# Patient Record
Sex: FEMALE | Race: WHITE | Employment: UNEMPLOYED | URBAN - METROPOLITAN AREA
[De-identification: names, ages, dates, MRNs, and addresses within clinical notes are randomized per-mention and may not be internally consistent; named-entity substitution may affect disease eponyms.]

---

## 2017-01-03 RX ORDER — METOPROLOL SUCCINATE 50 MG/1
50 TABLET, EXTENDED RELEASE ORAL DAILY
Qty: 90 TABLET | Refills: 0 | Status: SHIPPED | OUTPATIENT
Start: 2017-01-03 | End: 2017-03-17 | Stop reason: SDUPTHER

## 2017-01-20 ENCOUNTER — OFFICE VISIT (OUTPATIENT)
Dept: INTERNAL MEDICINE CLINIC | Age: 82
End: 2017-01-20

## 2017-01-20 VITALS
HEIGHT: 60 IN | HEART RATE: 59 BPM | RESPIRATION RATE: 18 BRPM | DIASTOLIC BLOOD PRESSURE: 75 MMHG | SYSTOLIC BLOOD PRESSURE: 110 MMHG

## 2017-01-20 DIAGNOSIS — I63.40 CEREBRAL INFARCTION DUE TO EMBOLISM OF CEREBRAL ARTERY (HCC): ICD-10-CM

## 2017-01-20 DIAGNOSIS — E66.01 MORBID OBESITY WITH BMI OF 50.0-59.9, ADULT (HCC): ICD-10-CM

## 2017-01-20 DIAGNOSIS — E11.9 TYPE 2 DIABETES MELLITUS WITHOUT COMPLICATION, WITHOUT LONG-TERM CURRENT USE OF INSULIN (HCC): Primary | ICD-10-CM

## 2017-01-20 DIAGNOSIS — I10 ESSENTIAL HYPERTENSION: ICD-10-CM

## 2017-01-20 DIAGNOSIS — I69.354 HEMIPLEGIA AND HEMIPARESIS FOLLOWING CEREBRAL INFARCTION AFFECTING LEFT NON-DOMINANT SIDE (HCC): ICD-10-CM

## 2017-01-20 DIAGNOSIS — F32.89 OTHER DEPRESSION: ICD-10-CM

## 2017-01-20 DIAGNOSIS — I48.0 PAROXYSMAL ATRIAL FIBRILLATION (HCC): ICD-10-CM

## 2017-01-20 PROCEDURE — 99214 OFFICE O/P EST MOD 30 MIN: CPT | Performed by: INTERNAL MEDICINE

## 2017-02-01 RX ORDER — FESOTERODINE FUMARATE 8 MG/1
TABLET, EXTENDED RELEASE ORAL
Qty: 90 TABLET | Refills: 0 | Status: SHIPPED | OUTPATIENT
Start: 2017-02-01 | End: 2017-04-27

## 2017-02-17 RX ORDER — SIMVASTATIN 20 MG
TABLET ORAL
Qty: 90 TABLET | Refills: 0 | Status: SHIPPED | OUTPATIENT
Start: 2017-02-17 | End: 2017-04-06 | Stop reason: SDUPTHER

## 2017-03-03 RX ORDER — ESCITALOPRAM OXALATE 10 MG/1
TABLET ORAL
Qty: 90 TABLET | Refills: 0 | Status: SHIPPED | OUTPATIENT
Start: 2017-03-03 | End: 2017-04-20 | Stop reason: SDUPTHER

## 2017-03-17 RX ORDER — SPIRONOLACTONE AND HYDROCHLOROTHIAZIDE 25; 25 MG/1; MG/1
1 TABLET ORAL DAILY
Qty: 90 TABLET | Refills: 0 | Status: SHIPPED | OUTPATIENT
Start: 2017-03-17 | End: 2017-06-01 | Stop reason: SDUPTHER

## 2017-03-17 RX ORDER — METOPROLOL SUCCINATE 50 MG/1
50 TABLET, EXTENDED RELEASE ORAL DAILY
Qty: 90 TABLET | Refills: 0 | Status: SHIPPED | OUTPATIENT
Start: 2017-03-17 | End: 2017-03-23 | Stop reason: SDUPTHER

## 2017-03-23 RX ORDER — METOPROLOL SUCCINATE 50 MG/1
50 TABLET, EXTENDED RELEASE ORAL DAILY
Qty: 90 TABLET | Refills: 0 | Status: SHIPPED | OUTPATIENT
Start: 2017-03-23 | End: 2017-06-09 | Stop reason: SDUPTHER

## 2017-04-07 ENCOUNTER — TELEPHONE (OUTPATIENT)
Dept: INTERNAL MEDICINE CLINIC | Age: 82
End: 2017-04-07

## 2017-04-07 RX ORDER — SIMVASTATIN 20 MG
20 TABLET ORAL NIGHTLY
Qty: 90 TABLET | Refills: 0 | Status: SHIPPED | OUTPATIENT
Start: 2017-04-07 | End: 2017-04-20 | Stop reason: SDUPTHER

## 2017-04-07 RX ORDER — BUTALBITAL, ACETAMINOPHEN, CAFFEINE AND CODEINE PHOSPHATE 300; 50; 40; 30 MG/1; MG/1; MG/1; MG/1
1 CAPSULE ORAL EVERY 8 HOURS PRN
Qty: 30 CAPSULE | Refills: 0 | Status: SHIPPED | OUTPATIENT
Start: 2017-04-07 | End: 2017-04-13 | Stop reason: SDUPTHER

## 2017-04-13 RX ORDER — OMEPRAZOLE 40 MG/1
40 CAPSULE, DELAYED RELEASE ORAL DAILY
Qty: 90 CAPSULE | Refills: 1 | Status: SHIPPED | OUTPATIENT
Start: 2017-04-13 | End: 2017-04-20 | Stop reason: SDUPTHER

## 2017-04-13 RX ORDER — FESOTERODINE FUMARATE 8 MG/1
TABLET, EXTENDED RELEASE ORAL
Qty: 90 TABLET | Refills: 0 | Status: CANCELLED | OUTPATIENT
Start: 2017-04-13

## 2017-04-13 RX ORDER — BUTALBITAL, ACETAMINOPHEN, CAFFEINE AND CODEINE PHOSPHATE 300; 50; 40; 30 MG/1; MG/1; MG/1; MG/1
1 CAPSULE ORAL EVERY 8 HOURS PRN
Qty: 30 CAPSULE | Refills: 0 | Status: SHIPPED | OUTPATIENT
Start: 2017-04-13 | End: 2018-03-08 | Stop reason: SDUPTHER

## 2017-04-20 RX ORDER — SIMVASTATIN 20 MG
20 TABLET ORAL NIGHTLY
Qty: 90 TABLET | Refills: 0 | Status: SHIPPED | OUTPATIENT
Start: 2017-04-20 | End: 2017-10-19 | Stop reason: SDUPTHER

## 2017-04-20 RX ORDER — MECLIZINE HCL 12.5 MG/1
12.5 TABLET ORAL 3 TIMES DAILY PRN
Qty: 270 TABLET | Refills: 0 | Status: SHIPPED | OUTPATIENT
Start: 2017-04-20 | End: 2017-08-07

## 2017-04-20 RX ORDER — ESCITALOPRAM OXALATE 10 MG/1
TABLET ORAL
Qty: 90 TABLET | Refills: 0 | Status: SHIPPED | OUTPATIENT
Start: 2017-04-20 | End: 2017-08-31 | Stop reason: SDUPTHER

## 2017-04-20 RX ORDER — OMEPRAZOLE 40 MG/1
40 CAPSULE, DELAYED RELEASE ORAL DAILY
Qty: 90 CAPSULE | Refills: 0 | Status: SHIPPED | OUTPATIENT
Start: 2017-04-20 | End: 2017-09-28 | Stop reason: SDUPTHER

## 2017-04-27 ENCOUNTER — OFFICE VISIT (OUTPATIENT)
Dept: INTERNAL MEDICINE CLINIC | Age: 82
End: 2017-04-27

## 2017-04-27 VITALS — DIASTOLIC BLOOD PRESSURE: 75 MMHG | SYSTOLIC BLOOD PRESSURE: 135 MMHG | RESPIRATION RATE: 18 BRPM | HEART RATE: 70 BPM

## 2017-04-27 DIAGNOSIS — E78.2 MIXED HYPERLIPIDEMIA: ICD-10-CM

## 2017-04-27 DIAGNOSIS — I10 ESSENTIAL HYPERTENSION: ICD-10-CM

## 2017-04-27 DIAGNOSIS — E66.01 MORBID OBESITY WITH BMI OF 50.0-59.9, ADULT (HCC): ICD-10-CM

## 2017-04-27 DIAGNOSIS — E11.9 TYPE 2 DIABETES MELLITUS WITHOUT COMPLICATION, WITHOUT LONG-TERM CURRENT USE OF INSULIN (HCC): ICD-10-CM

## 2017-04-27 DIAGNOSIS — F32.89 OTHER DEPRESSION: ICD-10-CM

## 2017-04-27 DIAGNOSIS — G81.90 HEMIPARESIS (HCC): ICD-10-CM

## 2017-04-27 DIAGNOSIS — I48.20 CHRONIC ATRIAL FIBRILLATION (HCC): ICD-10-CM

## 2017-04-27 DIAGNOSIS — I63.40 CEREBRAL INFARCTION DUE TO EMBOLISM OF CEREBRAL ARTERY (HCC): ICD-10-CM

## 2017-04-27 DIAGNOSIS — E11.9 TYPE 2 DIABETES MELLITUS WITHOUT COMPLICATION, WITHOUT LONG-TERM CURRENT USE OF INSULIN (HCC): Primary | ICD-10-CM

## 2017-04-27 LAB
A/G RATIO: 1.4 (ref 1.1–2.2)
ALBUMIN SERPL-MCNC: 4.1 G/DL (ref 3.4–5)
ALP BLD-CCNC: 97 U/L (ref 40–129)
ALT SERPL-CCNC: 30 U/L (ref 10–40)
ANION GAP SERPL CALCULATED.3IONS-SCNC: 17 MMOL/L (ref 3–16)
AST SERPL-CCNC: 38 U/L (ref 15–37)
BASOPHILS ABSOLUTE: 0.1 K/UL (ref 0–0.2)
BASOPHILS RELATIVE PERCENT: 0.9 %
BILIRUB SERPL-MCNC: 0.8 MG/DL (ref 0–1)
BUN BLDV-MCNC: 14 MG/DL (ref 7–20)
CALCIUM SERPL-MCNC: 8.7 MG/DL (ref 8.3–10.6)
CHLORIDE BLD-SCNC: 92 MMOL/L (ref 99–110)
CO2: 28 MMOL/L (ref 21–32)
CREAT SERPL-MCNC: 0.6 MG/DL (ref 0.6–1.2)
EOSINOPHILS ABSOLUTE: 0.4 K/UL (ref 0–0.6)
EOSINOPHILS RELATIVE PERCENT: 6.1 %
GFR AFRICAN AMERICAN: >60
GFR NON-AFRICAN AMERICAN: >60
GLOBULIN: 2.9 G/DL
GLUCOSE BLD-MCNC: 125 MG/DL (ref 70–99)
HCT VFR BLD CALC: 46.1 % (ref 36–48)
HEMOGLOBIN: 15.1 G/DL (ref 12–16)
LYMPHOCYTES ABSOLUTE: 2.1 K/UL (ref 1–5.1)
LYMPHOCYTES RELATIVE PERCENT: 30.4 %
MCH RBC QN AUTO: 30.3 PG (ref 26–34)
MCHC RBC AUTO-ENTMCNC: 32.7 G/DL (ref 31–36)
MCV RBC AUTO: 92.6 FL (ref 80–100)
MONOCYTES ABSOLUTE: 0.5 K/UL (ref 0–1.3)
MONOCYTES RELATIVE PERCENT: 6.9 %
NEUTROPHILS ABSOLUTE: 3.8 K/UL (ref 1.7–7.7)
NEUTROPHILS RELATIVE PERCENT: 55.7 %
PDW BLD-RTO: 14.1 % (ref 12.4–15.4)
PLATELET # BLD: 288 K/UL (ref 135–450)
PMV BLD AUTO: 8.3 FL (ref 5–10.5)
POTASSIUM SERPL-SCNC: 3.7 MMOL/L (ref 3.5–5.1)
RBC # BLD: 4.98 M/UL (ref 4–5.2)
SODIUM BLD-SCNC: 137 MMOL/L (ref 136–145)
TOTAL PROTEIN: 7 G/DL (ref 6.4–8.2)
WBC # BLD: 6.8 K/UL (ref 4–11)

## 2017-04-27 PROCEDURE — 99214 OFFICE O/P EST MOD 30 MIN: CPT | Performed by: INTERNAL MEDICINE

## 2017-04-27 RX ORDER — DABIGATRAN ETEXILATE 150 MG/1
150 CAPSULE, COATED PELLETS ORAL
COMMUNITY
Start: 2017-01-24 | End: 2018-09-19 | Stop reason: SINTOL

## 2017-04-28 LAB
ESTIMATED AVERAGE GLUCOSE: 122.6 MG/DL
HBA1C MFR BLD: 5.9 %

## 2017-05-01 ENCOUNTER — TELEPHONE (OUTPATIENT)
Dept: INTERNAL MEDICINE CLINIC | Age: 82
End: 2017-05-01

## 2017-05-08 ENCOUNTER — TELEPHONE (OUTPATIENT)
Dept: INTERNAL MEDICINE CLINIC | Age: 82
End: 2017-05-08

## 2017-05-25 ENCOUNTER — OFFICE VISIT (OUTPATIENT)
Dept: INTERNAL MEDICINE CLINIC | Age: 82
End: 2017-05-25

## 2017-05-25 VITALS — RESPIRATION RATE: 18 BRPM | DIASTOLIC BLOOD PRESSURE: 75 MMHG | HEART RATE: 70 BPM | SYSTOLIC BLOOD PRESSURE: 130 MMHG

## 2017-05-25 DIAGNOSIS — I69.351 HEMIPLEGIA AND HEMIPARESIS FOLLOWING CEREBRAL INFARCTION AFFECTING RIGHT DOMINANT SIDE (HCC): ICD-10-CM

## 2017-05-25 DIAGNOSIS — R07.9 CHEST PAIN, UNSPECIFIED TYPE: Primary | ICD-10-CM

## 2017-05-25 DIAGNOSIS — Z09 HOSPITAL DISCHARGE FOLLOW-UP: ICD-10-CM

## 2017-05-25 DIAGNOSIS — E66.01 MORBID OBESITY WITH BMI OF 50.0-59.9, ADULT (HCC): ICD-10-CM

## 2017-05-25 DIAGNOSIS — R35.0 FREQUENT URINATION: ICD-10-CM

## 2017-05-25 LAB
BILIRUBIN, POC: NORMAL
BLOOD URINE, POC: NORMAL
CLARITY, POC: NORMAL
COLOR, POC: NORMAL
GLUCOSE URINE, POC: NORMAL
KETONES, POC: NORMAL
LEUKOCYTE EST, POC: NORMAL
NITRITE, POC: NORMAL
PH, POC: NORMAL
PROTEIN, POC: NORMAL
SPECIFIC GRAVITY, POC: NORMAL
UROBILINOGEN, POC: NORMAL

## 2017-05-25 PROCEDURE — 99213 OFFICE O/P EST LOW 20 MIN: CPT | Performed by: INTERNAL MEDICINE

## 2017-05-25 PROCEDURE — 81002 URINALYSIS NONAUTO W/O SCOPE: CPT | Performed by: INTERNAL MEDICINE

## 2017-05-27 LAB
ORGANISM: ABNORMAL
URINE CULTURE, ROUTINE: ABNORMAL
URINE CULTURE, ROUTINE: ABNORMAL

## 2017-06-02 RX ORDER — SPIRONOLACTONE AND HYDROCHLOROTHIAZIDE 25; 25 MG/1; MG/1
1 TABLET ORAL DAILY
Qty: 90 TABLET | Refills: 0 | Status: SHIPPED | OUTPATIENT
Start: 2017-06-02 | End: 2017-08-31 | Stop reason: SDUPTHER

## 2017-06-05 ENCOUNTER — TELEPHONE (OUTPATIENT)
Dept: INTERNAL MEDICINE CLINIC | Age: 82
End: 2017-06-05

## 2017-06-09 RX ORDER — METOPROLOL SUCCINATE 50 MG/1
50 TABLET, EXTENDED RELEASE ORAL DAILY
Qty: 90 TABLET | Refills: 0 | Status: SHIPPED | OUTPATIENT
Start: 2017-06-09 | End: 2017-06-22 | Stop reason: SDUPTHER

## 2017-06-22 DIAGNOSIS — I48.0 PAROXYSMAL ATRIAL FIBRILLATION (HCC): Primary | ICD-10-CM

## 2017-06-22 RX ORDER — METOPROLOL SUCCINATE 50 MG/1
50 TABLET, EXTENDED RELEASE ORAL DAILY
Qty: 90 TABLET | Refills: 0 | Status: SHIPPED | OUTPATIENT
Start: 2017-06-22 | End: 2017-08-31 | Stop reason: SDUPTHER

## 2017-07-17 ENCOUNTER — OFFICE VISIT (OUTPATIENT)
Dept: INTERNAL MEDICINE CLINIC | Age: 82
End: 2017-07-17

## 2017-07-17 VITALS
DIASTOLIC BLOOD PRESSURE: 75 MMHG | SYSTOLIC BLOOD PRESSURE: 125 MMHG | RESPIRATION RATE: 18 BRPM | HEART RATE: 70 BPM | HEIGHT: 60 IN

## 2017-07-17 DIAGNOSIS — I10 ESSENTIAL HYPERTENSION: ICD-10-CM

## 2017-07-17 DIAGNOSIS — E78.2 MIXED HYPERLIPIDEMIA: ICD-10-CM

## 2017-07-17 DIAGNOSIS — G81.90: ICD-10-CM

## 2017-07-17 DIAGNOSIS — E11.9 TYPE 2 DIABETES MELLITUS WITHOUT COMPLICATION, WITHOUT LONG-TERM CURRENT USE OF INSULIN (HCC): Primary | ICD-10-CM

## 2017-07-17 DIAGNOSIS — I48.0 PAROXYSMAL ATRIAL FIBRILLATION (HCC): ICD-10-CM

## 2017-07-17 PROCEDURE — 99213 OFFICE O/P EST LOW 20 MIN: CPT | Performed by: INTERNAL MEDICINE

## 2017-07-20 ENCOUNTER — TELEPHONE (OUTPATIENT)
Dept: INTERNAL MEDICINE CLINIC | Age: 82
End: 2017-07-20

## 2017-07-20 ENCOUNTER — HOSPITAL ENCOUNTER (OUTPATIENT)
Dept: OTHER | Age: 82
Discharge: HOME | End: 2017-07-20
Attending: INTERNAL MEDICINE | Admitting: INTERNAL MEDICINE

## 2017-07-20 LAB — DIGOXIN LEVEL: 0.8 NG/ML (ref 0.8–2)

## 2017-07-27 RX ORDER — FESOTERODINE FUMARATE 8 MG/1
1 TABLET, EXTENDED RELEASE ORAL DAILY
Qty: 90 TABLET | Refills: 0 | Status: CANCELLED | OUTPATIENT
Start: 2017-07-27

## 2017-08-02 ENCOUNTER — TELEPHONE (OUTPATIENT)
Dept: INTERNAL MEDICINE CLINIC | Age: 82
End: 2017-08-02

## 2017-08-03 ENCOUNTER — TELEPHONE (OUTPATIENT)
Dept: INTERNAL MEDICINE CLINIC | Age: 82
End: 2017-08-03

## 2017-08-04 ENCOUNTER — TELEPHONE (OUTPATIENT)
Dept: INTERNAL MEDICINE CLINIC | Age: 82
End: 2017-08-04

## 2017-08-10 ENCOUNTER — HOSPITAL ENCOUNTER (OUTPATIENT)
Dept: SURGERY | Age: 82
Discharge: HOME | End: 2017-08-10
Attending: UROLOGY | Admitting: UROLOGY

## 2017-08-10 VITALS
WEIGHT: 272.3 LBS | HEIGHT: 60 IN | SYSTOLIC BLOOD PRESSURE: 141 MMHG | OXYGEN SATURATION: 95 % | BODY MASS INDEX: 53.46 KG/M2 | RESPIRATION RATE: 16 BRPM | DIASTOLIC BLOOD PRESSURE: 80 MMHG | TEMPERATURE: 97.4 F | HEART RATE: 74 BPM

## 2017-08-10 LAB
ANION GAP SERPL CALCULATED.3IONS-SCNC: 16 MMOL/L (ref 3–16)
BACTERIA: ABNORMAL /HPF
BILIRUBIN URINE: NEGATIVE
BLOOD, URINE: ABNORMAL
BUN BLDV-MCNC: 14 MG/DL (ref 7–20)
CALCIUM SERPL-MCNC: 9.2 MG/DL (ref 8.3–10.6)
CHLORIDE BLD-SCNC: 98 MMOL/L (ref 99–110)
CLARITY: CLEAR
CO2: 25 MMOL/L (ref 21–32)
COLOR: YELLOW
CREAT SERPL-MCNC: 0.6 MG/DL (ref 0.6–1.2)
EPITHELIAL CELLS, UA: ABNORMAL /HPF
GFR AFRICAN AMERICAN: >60
GFR NON-AFRICAN AMERICAN: >60
GLUCOSE BLD-MCNC: 125 MG/DL (ref 70–99)
GLUCOSE BLD-MCNC: 133 MG/DL (ref 70–99)
GLUCOSE URINE: NEGATIVE MG/DL
KETONES, URINE: NEGATIVE MG/DL
LEUKOCYTE ESTERASE, URINE: ABNORMAL
MICROSCOPIC EXAMINATION: YES
NITRITE, URINE: NEGATIVE
PERFORMED ON: ABNORMAL
PH UA: 5.5
POTASSIUM SERPL-SCNC: 4.2 MMOL/L (ref 3.5–5.1)
PROTEIN UA: NEGATIVE MG/DL
RBC UA: ABNORMAL /HPF (ref 0–2)
SODIUM BLD-SCNC: 139 MMOL/L (ref 136–145)
SPECIFIC GRAVITY UA: 1.02
URINE TYPE: ABNORMAL
UROBILINOGEN, URINE: 0.2 E.U./DL
WBC UA: ABNORMAL /HPF (ref 0–5)

## 2017-08-10 RX ORDER — OXYCODONE HYDROCHLORIDE AND ACETAMINOPHEN 5; 325 MG/1; MG/1
2 TABLET ORAL PRN
Status: DISPENSED | OUTPATIENT
Start: 2017-08-10 | End: 2017-08-10

## 2017-08-10 RX ORDER — ONDANSETRON 2 MG/ML
4 INJECTION INTRAMUSCULAR; INTRAVENOUS EVERY 30 MIN PRN
Status: DISCONTINUED | OUTPATIENT
Start: 2017-08-10 | End: 2017-08-11 | Stop reason: HOSPADM

## 2017-08-10 RX ORDER — OXYCODONE HYDROCHLORIDE AND ACETAMINOPHEN 5; 325 MG/1; MG/1
1 TABLET ORAL PRN
Status: ACTIVE | OUTPATIENT
Start: 2017-08-10 | End: 2017-08-10

## 2017-08-10 RX ORDER — CEPHALEXIN 500 MG/1
500 CAPSULE ORAL 2 TIMES DAILY
Qty: 14 CAPSULE | Refills: 0 | Status: SHIPPED | OUTPATIENT
Start: 2017-08-10 | End: 2017-08-17

## 2017-08-10 RX ORDER — SODIUM CHLORIDE, SODIUM LACTATE, POTASSIUM CHLORIDE, CALCIUM CHLORIDE 600; 310; 30; 20 MG/100ML; MG/100ML; MG/100ML; MG/100ML
INJECTION, SOLUTION INTRAVENOUS CONTINUOUS
Status: DISCONTINUED | OUTPATIENT
Start: 2017-08-10 | End: 2017-08-11 | Stop reason: HOSPADM

## 2017-08-10 RX ORDER — MEPERIDINE HYDROCHLORIDE 50 MG/ML
12.5 INJECTION INTRAMUSCULAR; INTRAVENOUS; SUBCUTANEOUS EVERY 5 MIN PRN
Status: DISCONTINUED | OUTPATIENT
Start: 2017-08-10 | End: 2017-08-11 | Stop reason: HOSPADM

## 2017-08-10 RX ORDER — HYDRALAZINE HYDROCHLORIDE 20 MG/ML
5 INJECTION INTRAMUSCULAR; INTRAVENOUS EVERY 30 MIN PRN
Status: DISCONTINUED | OUTPATIENT
Start: 2017-08-10 | End: 2017-08-11 | Stop reason: HOSPADM

## 2017-08-10 RX ORDER — LABETALOL HYDROCHLORIDE 5 MG/ML
5 INJECTION, SOLUTION INTRAVENOUS
Status: DISCONTINUED | OUTPATIENT
Start: 2017-08-10 | End: 2017-08-11 | Stop reason: HOSPADM

## 2017-08-10 RX ORDER — DIPHENHYDRAMINE HYDROCHLORIDE 50 MG/ML
6.25 INJECTION INTRAMUSCULAR; INTRAVENOUS
Status: ACTIVE | OUTPATIENT
Start: 2017-08-10 | End: 2017-08-10

## 2017-08-10 RX ADMIN — SODIUM CHLORIDE, SODIUM LACTATE, POTASSIUM CHLORIDE, CALCIUM CHLORIDE: 600; 310; 30; 20 INJECTION, SOLUTION INTRAVENOUS at 10:52

## 2017-08-11 LAB — URINE CULTURE, ROUTINE: NORMAL

## 2017-08-15 ENCOUNTER — OFFICE VISIT (OUTPATIENT)
Dept: INTERNAL MEDICINE CLINIC | Age: 82
End: 2017-08-15

## 2017-08-15 DIAGNOSIS — J02.9 ACUTE PHARYNGITIS, UNSPECIFIED ETIOLOGY: Primary | ICD-10-CM

## 2017-08-15 PROCEDURE — 99213 OFFICE O/P EST LOW 20 MIN: CPT | Performed by: INTERNAL MEDICINE

## 2017-08-15 RX ORDER — AMOXICILLIN 500 MG/1
500 CAPSULE ORAL 3 TIMES DAILY
Qty: 15 CAPSULE | Refills: 0 | Status: SHIPPED | OUTPATIENT
Start: 2017-08-15 | End: 2017-08-20

## 2017-08-24 ENCOUNTER — TELEPHONE (OUTPATIENT)
Dept: INTERNAL MEDICINE CLINIC | Age: 82
End: 2017-08-24

## 2017-08-24 RX ORDER — FESOTERODINE FUMARATE 8 MG/1
1 TABLET, EXTENDED RELEASE ORAL DAILY
Qty: 90 TABLET | Refills: 0 | Status: CANCELLED | OUTPATIENT
Start: 2017-08-24

## 2017-08-31 RX ORDER — ESCITALOPRAM OXALATE 10 MG/1
TABLET ORAL
Qty: 90 TABLET | Refills: 0 | Status: SHIPPED | OUTPATIENT
Start: 2017-08-31 | End: 2017-11-09 | Stop reason: SDUPTHER

## 2017-08-31 RX ORDER — SPIRONOLACTONE AND HYDROCHLOROTHIAZIDE 25; 25 MG/1; MG/1
1 TABLET ORAL DAILY
Qty: 90 TABLET | Refills: 0 | Status: SHIPPED | OUTPATIENT
Start: 2017-08-31 | End: 2017-12-15 | Stop reason: SDUPTHER

## 2017-08-31 RX ORDER — METOPROLOL SUCCINATE 50 MG/1
50 TABLET, EXTENDED RELEASE ORAL DAILY
Qty: 90 TABLET | Refills: 0 | Status: SHIPPED | OUTPATIENT
Start: 2017-08-31 | End: 2018-01-16 | Stop reason: SDUPTHER

## 2017-09-28 RX ORDER — OMEPRAZOLE 40 MG/1
40 CAPSULE, DELAYED RELEASE ORAL DAILY
Qty: 90 CAPSULE | Refills: 0 | Status: SHIPPED | OUTPATIENT
Start: 2017-09-28 | End: 2017-12-09 | Stop reason: SDUPTHER

## 2017-10-16 ENCOUNTER — TELEPHONE (OUTPATIENT)
Dept: INTERNAL MEDICINE CLINIC | Age: 82
End: 2017-10-16

## 2017-10-19 RX ORDER — SIMVASTATIN 20 MG
20 TABLET ORAL NIGHTLY
Qty: 90 TABLET | Refills: 0 | Status: SHIPPED | OUTPATIENT
Start: 2017-10-19 | End: 2017-12-15 | Stop reason: SDUPTHER

## 2017-10-30 ENCOUNTER — TELEPHONE (OUTPATIENT)
Dept: INTERNAL MEDICINE CLINIC | Age: 82
End: 2017-10-30

## 2017-11-09 RX ORDER — ESCITALOPRAM OXALATE 20 MG/1
TABLET ORAL
Qty: 90 TABLET | Refills: 0 | Status: SHIPPED | OUTPATIENT
Start: 2017-11-09 | End: 2017-12-15 | Stop reason: SDUPTHER

## 2017-12-11 RX ORDER — OMEPRAZOLE 40 MG/1
CAPSULE, DELAYED RELEASE ORAL
Qty: 90 CAPSULE | Refills: 0 | Status: SHIPPED | OUTPATIENT
Start: 2017-12-11 | End: 2018-09-06 | Stop reason: SDUPTHER

## 2017-12-15 RX ORDER — ESCITALOPRAM OXALATE 20 MG/1
TABLET ORAL
Qty: 90 TABLET | Refills: 0 | Status: SHIPPED | OUTPATIENT
Start: 2017-12-15 | End: 2018-03-08 | Stop reason: SDUPTHER

## 2017-12-15 RX ORDER — SPIRONOLACTONE AND HYDROCHLOROTHIAZIDE 25; 25 MG/1; MG/1
1 TABLET ORAL DAILY
Qty: 90 TABLET | Refills: 0 | Status: SHIPPED | OUTPATIENT
Start: 2017-12-15 | End: 2018-03-06 | Stop reason: SDUPTHER

## 2017-12-15 RX ORDER — SIMVASTATIN 20 MG
20 TABLET ORAL NIGHTLY
Qty: 90 TABLET | Refills: 0 | Status: SHIPPED | OUTPATIENT
Start: 2017-12-15 | End: 2018-07-03 | Stop reason: SDUPTHER

## 2017-12-15 NOTE — TELEPHONE ENCOUNTER
From: [place]  Sent: 12/14/2017 5:13 PM EST  Subject: Medication Renewal Request    [place] DENNY Woody would like a refill of the following medications:  spironolactone-hydrochlorothiazide (ALDACTAZIDE) 25-25 MG per tablet Arian Espino MD]  simvastatin (ZOCOR) 20 MG tablet Arian Espino MD]  escitalopram (LEXAPRO) 20 MG tablet Arian Espino MD]  metFORMIN (GLUCOPHAGE) 500 MG tablet Arian Espino MD]    Preferred pharmacy: [facility] [address], [address] [phone] - F [phone]    Comment:

## 2017-12-26 ENCOUNTER — TELEPHONE (OUTPATIENT)
Dept: INTERNAL MEDICINE CLINIC | Age: 82
End: 2017-12-26

## 2018-01-16 RX ORDER — METOPROLOL SUCCINATE 50 MG/1
TABLET, EXTENDED RELEASE ORAL
Qty: 90 TABLET | Refills: 0 | Status: SHIPPED | OUTPATIENT
Start: 2018-01-16 | End: 2018-03-08 | Stop reason: SDUPTHER

## 2018-02-06 ENCOUNTER — OFFICE VISIT (OUTPATIENT)
Dept: INTERNAL MEDICINE CLINIC | Age: 83
End: 2018-02-06

## 2018-02-06 VITALS
DIASTOLIC BLOOD PRESSURE: 75 MMHG | HEART RATE: 70 BPM | HEIGHT: 60 IN | RESPIRATION RATE: 18 BRPM | SYSTOLIC BLOOD PRESSURE: 125 MMHG

## 2018-02-06 DIAGNOSIS — E66.01 MORBID OBESITY WITH BMI OF 50.0-59.9, ADULT (HCC): ICD-10-CM

## 2018-02-06 DIAGNOSIS — E11.9 WELL CONTROLLED TYPE 2 DIABETES MELLITUS (HCC): ICD-10-CM

## 2018-02-06 DIAGNOSIS — I48.20 CHRONIC ATRIAL FIBRILLATION (HCC): ICD-10-CM

## 2018-02-06 DIAGNOSIS — I69.351 HEMIPLEGIA AND HEMIPARESIS FOLLOWING CEREBRAL INFARCTION AFFECTING RIGHT DOMINANT SIDE (HCC): ICD-10-CM

## 2018-02-06 DIAGNOSIS — F32.4 DEPRESSION, MAJOR, SINGLE EPISODE, IN PARTIAL REMISSION (HCC): ICD-10-CM

## 2018-02-06 DIAGNOSIS — I10 BENIGN ESSENTIAL HTN: Primary | ICD-10-CM

## 2018-02-06 LAB
A/G RATIO: 1.4 (ref 1.1–2.2)
ALBUMIN SERPL-MCNC: 4.2 G/DL (ref 3.4–5)
ALP BLD-CCNC: 90 U/L (ref 40–129)
ALT SERPL-CCNC: 20 U/L (ref 10–40)
ANION GAP SERPL CALCULATED.3IONS-SCNC: 15 MMOL/L (ref 3–16)
AST SERPL-CCNC: 25 U/L (ref 15–37)
BASOPHILS ABSOLUTE: 0.1 K/UL (ref 0–0.2)
BASOPHILS RELATIVE PERCENT: 1 %
BILIRUB SERPL-MCNC: 0.4 MG/DL (ref 0–1)
BUN BLDV-MCNC: 13 MG/DL (ref 7–20)
CALCIUM SERPL-MCNC: 9.7 MG/DL (ref 8.3–10.6)
CHLORIDE BLD-SCNC: 93 MMOL/L (ref 99–110)
CHOLESTEROL, TOTAL: 161 MG/DL (ref 0–199)
CO2: 30 MMOL/L (ref 21–32)
CREAT SERPL-MCNC: 0.7 MG/DL (ref 0.6–1.2)
DIGOXIN LEVEL: 0.8 NG/ML (ref 0.8–2)
EOSINOPHILS ABSOLUTE: 0.5 K/UL (ref 0–0.6)
EOSINOPHILS RELATIVE PERCENT: 6.3 %
GFR AFRICAN AMERICAN: >60
GFR NON-AFRICAN AMERICAN: >60
GLOBULIN: 3.1 G/DL
GLUCOSE BLD-MCNC: 138 MG/DL (ref 70–99)
HCT VFR BLD CALC: 43.3 % (ref 36–48)
HDLC SERPL-MCNC: 44 MG/DL (ref 40–60)
HEMOGLOBIN: 14.4 G/DL (ref 12–16)
LDL CHOLESTEROL CALCULATED: 77 MG/DL
LYMPHOCYTES ABSOLUTE: 2.3 K/UL (ref 1–5.1)
LYMPHOCYTES RELATIVE PERCENT: 30.6 %
MCH RBC QN AUTO: 30.7 PG (ref 26–34)
MCHC RBC AUTO-ENTMCNC: 33.3 G/DL (ref 31–36)
MCV RBC AUTO: 92.4 FL (ref 80–100)
MONOCYTES ABSOLUTE: 0.7 K/UL (ref 0–1.3)
MONOCYTES RELATIVE PERCENT: 8.8 %
NEUTROPHILS ABSOLUTE: 4 K/UL (ref 1.7–7.7)
NEUTROPHILS RELATIVE PERCENT: 53.3 %
PDW BLD-RTO: 14 % (ref 12.4–15.4)
PLATELET # BLD: 268 K/UL (ref 135–450)
PMV BLD AUTO: 7.9 FL (ref 5–10.5)
POTASSIUM SERPL-SCNC: 3.9 MMOL/L (ref 3.5–5.1)
RBC # BLD: 4.69 M/UL (ref 4–5.2)
SODIUM BLD-SCNC: 138 MMOL/L (ref 136–145)
TOTAL PROTEIN: 7.3 G/DL (ref 6.4–8.2)
TRIGL SERPL-MCNC: 201 MG/DL (ref 0–150)
VLDLC SERPL CALC-MCNC: 40 MG/DL
WBC # BLD: 7.4 K/UL (ref 4–11)

## 2018-02-06 PROCEDURE — 1090F PRES/ABSN URINE INCON ASSESS: CPT | Performed by: INTERNAL MEDICINE

## 2018-02-06 PROCEDURE — G8428 CUR MEDS NOT DOCUMENT: HCPCS | Performed by: INTERNAL MEDICINE

## 2018-02-06 PROCEDURE — 4040F PNEUMOC VAC/ADMIN/RCVD: CPT | Performed by: INTERNAL MEDICINE

## 2018-02-06 PROCEDURE — G8400 PT W/DXA NO RESULTS DOC: HCPCS | Performed by: INTERNAL MEDICINE

## 2018-02-06 PROCEDURE — G8417 CALC BMI ABV UP PARAM F/U: HCPCS | Performed by: INTERNAL MEDICINE

## 2018-02-06 PROCEDURE — 1123F ACP DISCUSS/DSCN MKR DOCD: CPT | Performed by: INTERNAL MEDICINE

## 2018-02-06 PROCEDURE — 99214 OFFICE O/P EST MOD 30 MIN: CPT | Performed by: INTERNAL MEDICINE

## 2018-02-06 PROCEDURE — G8598 ASA/ANTIPLAT THER USED: HCPCS | Performed by: INTERNAL MEDICINE

## 2018-02-06 PROCEDURE — G8484 FLU IMMUNIZE NO ADMIN: HCPCS | Performed by: INTERNAL MEDICINE

## 2018-02-06 PROCEDURE — 1036F TOBACCO NON-USER: CPT | Performed by: INTERNAL MEDICINE

## 2018-02-06 NOTE — PROGRESS NOTES
Subjective:      Patient ID: Nilda Judge is a 80 y.o. female. HPI         80 y.o. female  with known history of GI Bleed, htn, hyperlipidemia, A Fib, CVA with hemiparalysis lt side, and GERD. No further chest pains     Afibb - chronic with h.o CVA = was on coumadin . changed to pradaxa by cardiology 2017  No issues so far  No recent falls  Remains wheel chair bound   provides most care      DM- 2 - now on metformin with no side effects. Fasting sugars at 120-130. No low sugars. Depression -stable off celexa     urinary incontinence- Tanner Edwards working better than oxybutnin. Still with recurrent UTI . Recent cysto withno acute findings       No fevers. No chills. No nausea or vomiting . No sob. No falls.   No new unilateral weakness       Has right shoulder pain and arm pain  - chronic       Review of Systems    As above    Current Outpatient Prescriptions on File Prior to Visit   Medication Sig Dispense Refill    metoprolol succinate (TOPROL XL) 50 MG extended release tablet TAKE 1 TABLET DAILY 90 tablet 0    spironolactone-hydrochlorothiazide (ALDACTAZIDE) 25-25 MG per tablet Take 1 tablet by mouth daily 90 tablet 0    simvastatin (ZOCOR) 20 MG tablet Take 1 tablet by mouth nightly 90 tablet 0    escitalopram (LEXAPRO) 20 MG tablet TAKE 1 TABLET NIGHTLY 90 tablet 0    metFORMIN (GLUCOPHAGE) 500 MG tablet Take 1 tablet by mouth daily (with breakfast) 90 tablet 0    omeprazole (PRILOSEC) 40 MG delayed release capsule TAKE 1 CAPSULE DAILY 90 capsule 0    losartan (COZAAR) 50 MG tablet Take 50 mg by mouth daily      Multiple Vitamins-Minerals-FA (OCUVEL PO) Take 1 tablet by mouth daily      olopatadine (PATADAY) 0.2 % SOLN ophthalmic solution Apply 1 drop to eye daily Both eyes      cephALEXin (KEFLEX) 250 MG capsule Take 250 mg by mouth daily      niacin 500 MG extended release capsule Take 500 mg by mouth nightly      digoxin (LANOXIN) 125 MCG tablet Take 125 mcg by mouth daily      dabigatran (PRADAXA) 150 MG capsule Take 150 mg by mouth      butalbital-acetaminophen-caffeine-codeine (FIORICET WITH CODEINE) -91-30 MG per capsule Take 1 capsule by mouth every 8 hours as needed (migraine) 30 capsule 0    Fesoterodine Fumarate ER (TOVIAZ) 8 MG TB24 Take 1 tablet by mouth daily 90 tablet 0    ammonium lactate (LAC-HYDRIN) 12 % lotion Apply topically daily. (Patient taking differently: Apply topically as needed Apply topically daily.) 120 g 2    Blood Glucose Monitoring Suppl ([address]) W/DEVICE KIT Patient tests once daily. DX:E11.9 1 kit 0    ONE TOUCH LANCETS MISC Patient tests once daily. DX:E11.9 150 each 3    glucose blood VI test strips (ONE TOUCH TEST STRIPS) strip Patient tests once daily. DX: E11.9 150 each 3     No current facility-administered medications on file prior to visit. .There are no changes to past medical history, family history, social history or review of systems(except as noted in the history section) since prior note (all reviewed with patient). Objective:   Physical Exam     Vitals:    02/06/18 1553   BP: 125/75   Pulse: 70   Resp: 18       General appearance: elderly female alert, appears stated age and cooperative   Head: Normocephalic, without obvious abnormality, atraumatic   Eyes: conjunctivae/corneas clear. PERRL, EOM's intact. Neck: no adenopathy, no carotid bruit, no JVD, supple, symmetrical, trachea midline and thyroid not enlarged, symmetric, some cervical  Tenderness. Lungs- clear dusty  Heart: irregular rate and rhythm, S1, S2 normal, no murmur, click, rub or gallop   Abdomen: soft, non-tender; bowel sounds normal; no masses, no organomegaly   Extremities: extremities normal, atraumatic, dependant edema  Pulses: 2+ and symmetric   Edema - dependant edema  Neurologic: Grossly normal, left sided cva with chronic weakness . Wheel chair bound          Assessment:       1. Benign essential HTN     2.  Hemiplegia and hemiparesis

## 2018-02-07 LAB
ESTIMATED AVERAGE GLUCOSE: 128.4 MG/DL
HBA1C MFR BLD: 6.1 %

## 2018-02-09 ENCOUNTER — TELEPHONE (OUTPATIENT)
Dept: INTERNAL MEDICINE CLINIC | Age: 83
End: 2018-02-09

## 2018-02-09 NOTE — TELEPHONE ENCOUNTER
Home care orders waiting on  to fax [facility]. I attempted to call Santy Sommers because we cannot talk to her per her release of info. But number was disconnected anyway.

## 2018-02-09 NOTE — TELEPHONE ENCOUNTER
----- Message from Fran Palacios MD sent at 2/9/2018  4:21 PM EST -----  Contact: William Pozo [phone]  Ros Kerr     ----- Message -----  From: Chuck Campos  Sent: 2/9/2018   3:28 PM  To: Fran Palacios MD    Ladan(did not give me her relation to pt) requests that you approve Home Care for patient. She has used [facility] in the past and she likes them, she would also be okay with [facility].    Please call T[place] at [phone]  MT

## 2018-02-22 ENCOUNTER — TELEPHONE (OUTPATIENT)
Dept: INTERNAL MEDICINE CLINIC | Age: 83
End: 2018-02-22

## 2018-02-22 NOTE — TELEPHONE ENCOUNTER
----- Message from Billie Reddy MD sent at 2/22/2018  1:32 PM EST -----  Contact: Dewayne Portillo, Nurse, [phone]  Please update    ----- Message -----  From: Sal Pruitt  Sent: 2/22/2018  11:56 AM  To: Billie Reddy MD        ----- Message -----  From: Juan Fair  Sent: 2/22/2018  11:47 AM  To: Meeta Neri nurse Dewayne Portillo is wanting an update on the order for home care through [facility] she requested 2-9-18. I did verify the number because it was disconnected last time, and the correct number is [phone].    MT

## 2018-03-06 RX ORDER — OMEPRAZOLE 40 MG/1
CAPSULE, DELAYED RELEASE ORAL
Qty: 90 CAPSULE | Refills: 0 | Status: ON HOLD | OUTPATIENT
Start: 2018-03-06 | End: 2018-08-26 | Stop reason: HOSPADM

## 2018-03-07 RX ORDER — SPIRONOLACTONE AND HYDROCHLOROTHIAZIDE 25; 25 MG/1; MG/1
TABLET ORAL
Qty: 90 TABLET | Refills: 0 | Status: SHIPPED | OUTPATIENT
Start: 2018-03-07 | End: 2018-06-30 | Stop reason: SDUPTHER

## 2018-03-08 DIAGNOSIS — G43.809 OTHER MIGRAINE WITHOUT STATUS MIGRAINOSUS, NOT INTRACTABLE: Primary | ICD-10-CM

## 2018-03-08 RX ORDER — ESCITALOPRAM OXALATE 20 MG/1
TABLET ORAL
Qty: 90 TABLET | Refills: 0 | Status: SHIPPED | OUTPATIENT
Start: 2018-03-08 | End: 2018-03-09 | Stop reason: SDUPTHER

## 2018-03-08 RX ORDER — METOPROLOL SUCCINATE 50 MG/1
50 TABLET, EXTENDED RELEASE ORAL DAILY
Qty: 90 TABLET | Refills: 0 | Status: ON HOLD | OUTPATIENT
Start: 2018-03-08 | End: 2018-08-29 | Stop reason: SDUPTHER

## 2018-03-08 RX ORDER — SPIRONOLACTONE AND HYDROCHLOROTHIAZIDE 25; 25 MG/1; MG/1
TABLET ORAL
Qty: 90 TABLET | Refills: 0 | OUTPATIENT
Start: 2018-03-08

## 2018-03-08 RX ORDER — BUTALBITAL, ACETAMINOPHEN, CAFFEINE AND CODEINE PHOSPHATE 300; 50; 40; 30 MG/1; MG/1; MG/1; MG/1
1 CAPSULE ORAL EVERY 8 HOURS PRN
Qty: 30 CAPSULE | Refills: 0 | Status: SHIPPED | OUTPATIENT
Start: 2018-03-08 | End: 2018-06-30 | Stop reason: SDUPTHER

## 2018-03-08 NOTE — TELEPHONE ENCOUNTER
From: [place]  Sent: 3/8/2018 4:06 PM EST  Subject: Medication Renewal Request    Suzanne Torres.  Rain Ward would like a refill of the following medications:     metoprolol succinate (TOPROL XL) 50 MG extended release tablet Ruba Bautista MD]    Preferred pharmacy: [facility] [address] [phone] - F [phone]    Comment:

## 2018-03-09 RX ORDER — ESCITALOPRAM OXALATE 20 MG/1
TABLET ORAL
Qty: 90 TABLET | Refills: 0 | Status: ON HOLD | OUTPATIENT
Start: 2018-03-09 | End: 2018-08-28 | Stop reason: SDUPTHER

## 2018-03-09 NOTE — TELEPHONE ENCOUNTER
From: [place]  Sent: 3/8/2018 11:00 PM EST  Subject: Medication Renewal Request    [address] [place] [address]  [facility] would like a refill of the following medications:     escitalopram (LEXAPRO) 20 MG tablet Rakesh Bai MD]    Preferred pharmacy: [facility] [address] [phone] - F [phone]    Comment:

## 2018-03-19 RX ORDER — VENLAFAXINE HYDROCHLORIDE 37.5 MG/1
37.5 CAPSULE, EXTENDED RELEASE ORAL DAILY
Qty: 30 CAPSULE | Refills: 3 | Status: SHIPPED | OUTPATIENT
Start: 2018-03-19 | End: 2018-05-29 | Stop reason: SDUPTHER

## 2018-03-22 ENCOUNTER — TELEPHONE (OUTPATIENT)
Dept: INTERNAL MEDICINE CLINIC | Age: 83
End: 2018-03-22

## 2018-03-22 NOTE — TELEPHONE ENCOUNTER
----- Message from Colin Cheek MD sent at 3/22/2018  3:06 PM EDT -----  Contact: Giselle Jarrett [phone]  Continue   ----- Message -----  From: Monica Rosario  Sent: 3/22/2018   1:34 PM  To: MD Giselle Rizzo from [facility] called on behalf of pt. Inquiring about Pt. Being prescribed venlafaxine - should she continue taking other anti-depressants while taking the venlafaxine or should she discontinue a anti-depressants.     Future visit:06/04/2018  Last visit:02/06/2018  km

## 2018-05-09 ENCOUNTER — HOSPITAL ENCOUNTER (OUTPATIENT)
Dept: OTHER | Age: 83
Discharge: HOME | End: 2018-05-09
Attending: INTERNAL MEDICINE | Admitting: INTERNAL MEDICINE

## 2018-05-09 LAB
BACTERIA: ABNORMAL /HPF
BILIRUBIN URINE: NEGATIVE
BLOOD, URINE: NEGATIVE
CLARITY: CLEAR
COLOR: YELLOW
GLUCOSE URINE: NEGATIVE MG/DL
KETONES, URINE: NEGATIVE MG/DL
LEUKOCYTE ESTERASE, URINE: ABNORMAL
MICROSCOPIC EXAMINATION: YES
NITRITE, URINE: NEGATIVE
PH UA: 5.5
PROTEIN UA: NEGATIVE MG/DL
RBC UA: ABNORMAL /HPF (ref 0–2)
SPECIFIC GRAVITY UA: <=1.005
URINE TYPE: ABNORMAL
UROBILINOGEN, URINE: 0.2 E.U./DL
WBC UA: ABNORMAL /HPF (ref 0–5)

## 2018-05-12 LAB
ORGANISM: ABNORMAL
URINE CULTURE, ROUTINE: ABNORMAL
URINE CULTURE, ROUTINE: ABNORMAL

## 2018-05-29 ENCOUNTER — TELEPHONE (OUTPATIENT)
Dept: INTERNAL MEDICINE CLINIC | Age: 83
End: 2018-05-29

## 2018-05-29 RX ORDER — NITROFURANTOIN 25; 75 MG/1; MG/1
100 CAPSULE ORAL 2 TIMES DAILY
Qty: 14 CAPSULE | Refills: 0 | Status: SHIPPED | OUTPATIENT
Start: 2018-05-29 | End: 2018-06-05

## 2018-05-29 RX ORDER — VENLAFAXINE HYDROCHLORIDE 37.5 MG/1
37.5 CAPSULE, EXTENDED RELEASE ORAL DAILY
Qty: 90 CAPSULE | Refills: 0 | Status: SHIPPED | OUTPATIENT
Start: 2018-05-29 | End: 2018-06-04 | Stop reason: SDUPTHER

## 2018-06-04 ENCOUNTER — TELEPHONE (OUTPATIENT)
Dept: INTERNAL MEDICINE CLINIC | Age: 83
End: 2018-06-04

## 2018-06-04 RX ORDER — VENLAFAXINE HYDROCHLORIDE 37.5 MG/1
37.5 CAPSULE, EXTENDED RELEASE ORAL DAILY
Qty: 90 CAPSULE | Refills: 0 | Status: SHIPPED | OUTPATIENT
Start: 2018-06-04 | End: 2018-06-11 | Stop reason: SDUPTHER

## 2018-06-08 ENCOUNTER — TELEPHONE (OUTPATIENT)
Dept: INTERNAL MEDICINE CLINIC | Age: 83
End: 2018-06-08

## 2018-06-11 RX ORDER — VENLAFAXINE HYDROCHLORIDE 37.5 MG/1
37.5 CAPSULE, EXTENDED RELEASE ORAL DAILY
Qty: 90 CAPSULE | Refills: 0 | Status: ON HOLD | OUTPATIENT
Start: 2018-06-11 | End: 2018-08-28 | Stop reason: SDUPTHER

## 2018-06-30 DIAGNOSIS — G43.809 OTHER MIGRAINE WITHOUT STATUS MIGRAINOSUS, NOT INTRACTABLE: ICD-10-CM

## 2018-07-02 RX ORDER — BUTALBITAL, ACETAMINOPHEN, CAFFEINE AND CODEINE PHOSPHATE 300; 50; 40; 30 MG/1; MG/1; MG/1; MG/1
CAPSULE ORAL
Qty: 30 CAPSULE | Refills: 0 | Status: SHIPPED | OUTPATIENT
Start: 2018-07-02 | End: 2019-02-06 | Stop reason: SDUPTHER

## 2018-07-02 RX ORDER — SPIRONOLACTONE AND HYDROCHLOROTHIAZIDE 25; 25 MG/1; MG/1
TABLET ORAL
Qty: 90 TABLET | Refills: 0 | Status: SHIPPED | OUTPATIENT
Start: 2018-07-02 | End: 2018-11-26 | Stop reason: SDUPTHER

## 2018-07-03 ENCOUNTER — TELEPHONE (OUTPATIENT)
Dept: INTERNAL MEDICINE CLINIC | Age: 83
End: 2018-07-03

## 2018-07-03 RX ORDER — SIMVASTATIN 20 MG
20 TABLET ORAL NIGHTLY
Qty: 90 TABLET | Refills: 0 | Status: SHIPPED | OUTPATIENT
Start: 2018-07-03 | End: 2018-11-09 | Stop reason: SDUPTHER

## 2018-08-03 RX ORDER — OLOPATADINE HYDROCHLORIDE 2 MG/ML
1 SOLUTION/ DROPS OPHTHALMIC DAILY
OUTPATIENT
Start: 2018-08-03

## 2018-08-25 ENCOUNTER — APPOINTMENT (OUTPATIENT)
Dept: GENERAL RADIOLOGY | Age: 83
DRG: 291 | End: 2018-08-25
Payer: MEDICARE

## 2018-08-25 ENCOUNTER — HOSPITAL ENCOUNTER (INPATIENT)
Age: 83
LOS: 2 days | Discharge: HOME | DRG: 291 | End: 2018-08-29
Attending: EMERGENCY MEDICINE | Admitting: HOSPITALIST
Payer: MEDICARE

## 2018-08-25 DIAGNOSIS — I50.21 ACUTE SYSTOLIC CONGESTIVE HEART FAILURE (HCC): Primary | ICD-10-CM

## 2018-08-25 LAB
A/G RATIO: 1.2 (ref 1.1–2.2)
ALBUMIN SERPL-MCNC: 3.9 G/DL (ref 3.4–5)
ALP BLD-CCNC: 105 U/L (ref 40–129)
ALT SERPL-CCNC: 23 U/L (ref 10–40)
ANION GAP SERPL CALCULATED.3IONS-SCNC: 13 MMOL/L (ref 3–16)
AST SERPL-CCNC: 26 U/L (ref 15–37)
BASOPHILS ABSOLUTE: 0 K/UL (ref 0–0.2)
BASOPHILS RELATIVE PERCENT: 0.5 %
BILIRUB SERPL-MCNC: 0.8 MG/DL (ref 0–1)
BUN BLDV-MCNC: 11 MG/DL (ref 7–20)
CALCIUM SERPL-MCNC: 9.6 MG/DL (ref 8.3–10.6)
CHLORIDE BLD-SCNC: 96 MMOL/L (ref 99–110)
CO2: 27 MMOL/L (ref 21–32)
CREAT SERPL-MCNC: <0.5 MG/DL (ref 0.6–1.2)
EOSINOPHILS ABSOLUTE: 0.3 K/UL (ref 0–0.6)
EOSINOPHILS RELATIVE PERCENT: 3.4 %
GFR AFRICAN AMERICAN: >60
GFR NON-AFRICAN AMERICAN: >60
GLOBULIN: 3.3 G/DL
GLUCOSE BLD-MCNC: 149 MG/DL (ref 70–99)
HCT VFR BLD CALC: 41.2 % (ref 36–48)
HEMOGLOBIN: 14.1 G/DL (ref 12–16)
LIPASE: 19 U/L (ref 13–60)
LYMPHOCYTES ABSOLUTE: 1.8 K/UL (ref 1–5.1)
LYMPHOCYTES RELATIVE PERCENT: 17.4 %
MCH RBC QN AUTO: 31.5 PG (ref 26–34)
MCHC RBC AUTO-ENTMCNC: 34.3 G/DL (ref 31–36)
MCV RBC AUTO: 91.8 FL (ref 80–100)
MONOCYTES ABSOLUTE: 0.7 K/UL (ref 0–1.3)
MONOCYTES RELATIVE PERCENT: 6.8 %
NEUTROPHILS ABSOLUTE: 7.3 K/UL (ref 1.7–7.7)
NEUTROPHILS RELATIVE PERCENT: 71.9 %
PDW BLD-RTO: 13.5 % (ref 12.4–15.4)
PLATELET # BLD: 236 K/UL (ref 135–450)
PMV BLD AUTO: 7.4 FL (ref 5–10.5)
POTASSIUM SERPL-SCNC: 4.2 MMOL/L (ref 3.5–5.1)
PRO-BNP: 882 PG/ML (ref 0–449)
RBC # BLD: 4.49 M/UL (ref 4–5.2)
SODIUM BLD-SCNC: 136 MMOL/L (ref 136–145)
TOTAL PROTEIN: 7.2 G/DL (ref 6.4–8.2)
TROPONIN: <0.01 NG/ML
WBC # BLD: 10.2 K/UL (ref 4–11)

## 2018-08-25 PROCEDURE — 83880 ASSAY OF NATRIURETIC PEPTIDE: CPT

## 2018-08-25 PROCEDURE — 2580000003 HC RX 258: Performed by: EMERGENCY MEDICINE

## 2018-08-25 PROCEDURE — 99285 EMERGENCY DEPT VISIT HI MDM: CPT

## 2018-08-25 PROCEDURE — 71045 X-RAY EXAM CHEST 1 VIEW: CPT

## 2018-08-25 PROCEDURE — 96360 HYDRATION IV INFUSION INIT: CPT

## 2018-08-25 PROCEDURE — 83036 HEMOGLOBIN GLYCOSYLATED A1C: CPT

## 2018-08-25 PROCEDURE — 93005 ELECTROCARDIOGRAM TRACING: CPT | Performed by: EMERGENCY MEDICINE

## 2018-08-25 PROCEDURE — 83735 ASSAY OF MAGNESIUM: CPT

## 2018-08-25 PROCEDURE — 85025 COMPLETE CBC W/AUTO DIFF WBC: CPT

## 2018-08-25 PROCEDURE — 51702 INSERT TEMP BLADDER CATH: CPT

## 2018-08-25 PROCEDURE — 80053 COMPREHEN METABOLIC PANEL: CPT

## 2018-08-25 PROCEDURE — 83690 ASSAY OF LIPASE: CPT

## 2018-08-25 PROCEDURE — 84484 ASSAY OF TROPONIN QUANT: CPT

## 2018-08-25 PROCEDURE — 80162 ASSAY OF DIGOXIN TOTAL: CPT

## 2018-08-25 RX ORDER — ONDANSETRON 2 MG/ML
4 INJECTION INTRAMUSCULAR; INTRAVENOUS EVERY 30 MIN PRN
Status: DISCONTINUED | OUTPATIENT
Start: 2018-08-25 | End: 2018-08-26

## 2018-08-25 RX ORDER — 0.9 % SODIUM CHLORIDE 0.9 %
1000 INTRAVENOUS SOLUTION INTRAVENOUS ONCE
Status: COMPLETED | OUTPATIENT
Start: 2018-08-25 | End: 2018-08-26

## 2018-08-25 RX ADMIN — SODIUM CHLORIDE 1000 ML: 9 INJECTION, SOLUTION INTRAVENOUS at 23:22

## 2018-08-26 PROBLEM — R94.31 QT PROLONGATION: Status: ACTIVE | Noted: 2018-08-26

## 2018-08-26 PROBLEM — E66.01 MORBID OBESITY WITH BMI OF 50.0-59.9, ADULT (HCC): Chronic | Status: ACTIVE | Noted: 2018-08-26

## 2018-08-26 PROBLEM — R60.9 INTERSTITIAL EDEMA: Status: ACTIVE | Noted: 2018-08-26

## 2018-08-26 PROBLEM — R06.02 SOB (SHORTNESS OF BREATH): Status: ACTIVE | Noted: 2018-08-26

## 2018-08-26 PROBLEM — R79.89 ELEVATED BRAIN NATRIURETIC PEPTIDE (BNP) LEVEL: Status: ACTIVE | Noted: 2018-08-26

## 2018-08-26 PROBLEM — Z79.01 ANTICOAGULATED: Chronic | Status: ACTIVE | Noted: 2018-08-26

## 2018-08-26 PROBLEM — R10.9 ABDOMINAL PAIN: Status: ACTIVE | Noted: 2018-08-26

## 2018-08-26 LAB
ALBUMIN SERPL-MCNC: 3.2 G/DL (ref 3.4–5)
ANION GAP SERPL CALCULATED.3IONS-SCNC: 11 MMOL/L (ref 3–16)
APTT: 25.4 SEC (ref 26–36)
BASOPHILS ABSOLUTE: 0 K/UL (ref 0–0.2)
BASOPHILS RELATIVE PERCENT: 0.6 %
BUN BLDV-MCNC: 11 MG/DL (ref 7–20)
CALCIUM SERPL-MCNC: 8.7 MG/DL (ref 8.3–10.6)
CHLORIDE BLD-SCNC: 99 MMOL/L (ref 99–110)
CHOLESTEROL, TOTAL: 129 MG/DL (ref 0–199)
CO2: 26 MMOL/L (ref 21–32)
CREAT SERPL-MCNC: <0.5 MG/DL (ref 0.6–1.2)
DIGOXIN LEVEL: 0.5 NG/ML (ref 0.8–2)
EOSINOPHILS ABSOLUTE: 0.4 K/UL (ref 0–0.6)
EOSINOPHILS RELATIVE PERCENT: 5.6 %
ESTIMATED AVERAGE GLUCOSE: 139.9 MG/DL
GFR AFRICAN AMERICAN: >60
GFR NON-AFRICAN AMERICAN: >60
GLUCOSE BLD-MCNC: 113 MG/DL (ref 70–99)
GLUCOSE BLD-MCNC: 117 MG/DL (ref 70–99)
GLUCOSE BLD-MCNC: 128 MG/DL (ref 70–99)
GLUCOSE BLD-MCNC: 139 MG/DL (ref 70–99)
GLUCOSE BLD-MCNC: 141 MG/DL (ref 70–99)
HBA1C MFR BLD: 6.5 %
HCT VFR BLD CALC: 40.2 % (ref 36–48)
HDLC SERPL-MCNC: 36 MG/DL (ref 40–60)
HEMOGLOBIN: 13.4 G/DL (ref 12–16)
INR BLD: 1.1 (ref 0.86–1.14)
LACTIC ACID: 1.3 MMOL/L (ref 0.4–2)
LACTIC ACID: 1.3 MMOL/L (ref 0.4–2)
LDL CHOLESTEROL CALCULATED: 64 MG/DL
LYMPHOCYTES ABSOLUTE: 1.8 K/UL (ref 1–5.1)
LYMPHOCYTES RELATIVE PERCENT: 26.9 %
MAGNESIUM: 1.9 MG/DL (ref 1.8–2.4)
MAGNESIUM: 2 MG/DL (ref 1.8–2.4)
MCH RBC QN AUTO: 31.1 PG (ref 26–34)
MCHC RBC AUTO-ENTMCNC: 33.4 G/DL (ref 31–36)
MCV RBC AUTO: 93.2 FL (ref 80–100)
MONOCYTES ABSOLUTE: 0.6 K/UL (ref 0–1.3)
MONOCYTES RELATIVE PERCENT: 8.9 %
NEUTROPHILS ABSOLUTE: 3.9 K/UL (ref 1.7–7.7)
NEUTROPHILS RELATIVE PERCENT: 58 %
PDW BLD-RTO: 13.9 % (ref 12.4–15.4)
PERFORMED ON: ABNORMAL
PHOSPHORUS: 4.2 MG/DL (ref 2.5–4.9)
PLATELET # BLD: 196 K/UL (ref 135–450)
PMV BLD AUTO: 7.5 FL (ref 5–10.5)
POTASSIUM SERPL-SCNC: 3.9 MMOL/L (ref 3.5–5.1)
PRO-BNP: 1071 PG/ML (ref 0–449)
PROTHROMBIN TIME: 12.5 SEC (ref 9.8–13)
RBC # BLD: 4.31 M/UL (ref 4–5.2)
SODIUM BLD-SCNC: 136 MMOL/L (ref 136–145)
TRIGL SERPL-MCNC: 144 MG/DL (ref 0–150)
TROPONIN: <0.01 NG/ML
TROPONIN: <0.01 NG/ML
TSH REFLEX: 2.69 UIU/ML (ref 0.27–4.2)
VLDLC SERPL CALC-MCNC: 29 MG/DL
WBC # BLD: 6.7 K/UL (ref 4–11)

## 2018-08-26 PROCEDURE — 83605 ASSAY OF LACTIC ACID: CPT

## 2018-08-26 PROCEDURE — 96374 THER/PROPH/DIAG INJ IV PUSH: CPT

## 2018-08-26 PROCEDURE — 83735 ASSAY OF MAGNESIUM: CPT

## 2018-08-26 PROCEDURE — 84484 ASSAY OF TROPONIN QUANT: CPT

## 2018-08-26 PROCEDURE — 85730 THROMBOPLASTIN TIME PARTIAL: CPT

## 2018-08-26 PROCEDURE — G0378 HOSPITAL OBSERVATION PER HR: HCPCS

## 2018-08-26 PROCEDURE — 83880 ASSAY OF NATRIURETIC PEPTIDE: CPT

## 2018-08-26 PROCEDURE — 96361 HYDRATE IV INFUSION ADD-ON: CPT

## 2018-08-26 PROCEDURE — 84443 ASSAY THYROID STIM HORMONE: CPT

## 2018-08-26 PROCEDURE — 2700000000 HC OXYGEN THERAPY PER DAY

## 2018-08-26 PROCEDURE — 85610 PROTHROMBIN TIME: CPT

## 2018-08-26 PROCEDURE — 2580000003 HC RX 258: Performed by: HOSPITALIST

## 2018-08-26 PROCEDURE — 94761 N-INVAS EAR/PLS OXIMETRY MLT: CPT

## 2018-08-26 PROCEDURE — 6370000000 HC RX 637 (ALT 250 FOR IP): Performed by: HOSPITALIST

## 2018-08-26 PROCEDURE — 85025 COMPLETE CBC W/AUTO DIFF WBC: CPT

## 2018-08-26 PROCEDURE — 6360000002 HC RX W HCPCS: Performed by: HOSPITALIST

## 2018-08-26 PROCEDURE — 80061 LIPID PANEL: CPT

## 2018-08-26 PROCEDURE — 51702 INSERT TEMP BLADDER CATH: CPT

## 2018-08-26 PROCEDURE — 93010 ELECTROCARDIOGRAM REPORT: CPT | Performed by: INTERNAL MEDICINE

## 2018-08-26 PROCEDURE — 80069 RENAL FUNCTION PANEL: CPT

## 2018-08-26 PROCEDURE — 36415 COLL VENOUS BLD VENIPUNCTURE: CPT

## 2018-08-26 RX ORDER — OLOPATADINE HYDROCHLORIDE 2 MG/ML
1 SOLUTION/ DROPS OPHTHALMIC DAILY
Status: DISCONTINUED | OUTPATIENT
Start: 2018-08-26 | End: 2018-08-29 | Stop reason: HOSPADM

## 2018-08-26 RX ORDER — SODIUM CHLORIDE 0.9 % (FLUSH) 0.9 %
10 SYRINGE (ML) INJECTION EVERY 12 HOURS SCHEDULED
Status: DISCONTINUED | OUTPATIENT
Start: 2018-08-26 | End: 2018-08-29 | Stop reason: HOSPADM

## 2018-08-26 RX ORDER — SPIRONOLACTONE AND HYDROCHLOROTHIAZIDE 25; 25 MG/1; MG/1
1 TABLET ORAL DAILY
Status: DISCONTINUED | OUTPATIENT
Start: 2018-08-26 | End: 2018-08-26 | Stop reason: CLARIF

## 2018-08-26 RX ORDER — MAGNESIUM SULFATE 1 G/100ML
1 INJECTION INTRAVENOUS PRN
Status: DISCONTINUED | OUTPATIENT
Start: 2018-08-26 | End: 2018-08-29 | Stop reason: HOSPADM

## 2018-08-26 RX ORDER — PANTOPRAZOLE SODIUM 40 MG/1
40 TABLET, DELAYED RELEASE ORAL DAILY
Status: DISCONTINUED | OUTPATIENT
Start: 2018-08-26 | End: 2018-08-29 | Stop reason: HOSPADM

## 2018-08-26 RX ORDER — HYDROCHLOROTHIAZIDE 25 MG/1
25 TABLET ORAL DAILY
Status: DISCONTINUED | OUTPATIENT
Start: 2018-08-26 | End: 2018-08-29 | Stop reason: HOSPADM

## 2018-08-26 RX ORDER — ACETAMINOPHEN 325 MG/1
650 TABLET ORAL EVERY 4 HOURS PRN
Status: DISCONTINUED | OUTPATIENT
Start: 2018-08-26 | End: 2018-08-29 | Stop reason: HOSPADM

## 2018-08-26 RX ORDER — M-VIT,TX,IRON,MINS/CALC/FOLIC 27MG-0.4MG
1 TABLET ORAL DAILY
Status: DISCONTINUED | OUTPATIENT
Start: 2018-08-26 | End: 2018-08-29 | Stop reason: HOSPADM

## 2018-08-26 RX ORDER — DEXTROSE MONOHYDRATE 50 MG/ML
100 INJECTION, SOLUTION INTRAVENOUS PRN
Status: DISCONTINUED | OUTPATIENT
Start: 2018-08-26 | End: 2018-08-29 | Stop reason: HOSPADM

## 2018-08-26 RX ORDER — SODIUM CHLORIDE 0.9 % (FLUSH) 0.9 %
10 SYRINGE (ML) INJECTION PRN
Status: DISCONTINUED | OUTPATIENT
Start: 2018-08-26 | End: 2018-08-29 | Stop reason: HOSPADM

## 2018-08-26 RX ORDER — CEPHALEXIN 250 MG/1
250 CAPSULE ORAL DAILY
Status: DISCONTINUED | OUTPATIENT
Start: 2018-08-26 | End: 2018-08-29 | Stop reason: HOSPADM

## 2018-08-26 RX ORDER — POTASSIUM CHLORIDE 20 MEQ/1
40 TABLET, EXTENDED RELEASE ORAL PRN
Status: DISCONTINUED | OUTPATIENT
Start: 2018-08-26 | End: 2018-08-29 | Stop reason: HOSPADM

## 2018-08-26 RX ORDER — LOSARTAN POTASSIUM 25 MG/1
50 TABLET ORAL DAILY
Status: DISCONTINUED | OUTPATIENT
Start: 2018-08-26 | End: 2018-08-29 | Stop reason: HOSPADM

## 2018-08-26 RX ORDER — VENLAFAXINE HYDROCHLORIDE 37.5 MG/1
37.5 CAPSULE, EXTENDED RELEASE ORAL DAILY
Status: DISCONTINUED | OUTPATIENT
Start: 2018-08-26 | End: 2018-08-29 | Stop reason: HOSPADM

## 2018-08-26 RX ORDER — DABIGATRAN ETEXILATE 150 MG/1
150 CAPSULE, COATED PELLETS ORAL 2 TIMES DAILY
Status: DISCONTINUED | OUTPATIENT
Start: 2018-08-26 | End: 2018-08-29 | Stop reason: HOSPADM

## 2018-08-26 RX ORDER — SPIRONOLACTONE 25 MG/1
25 TABLET ORAL DAILY
Status: DISCONTINUED | OUTPATIENT
Start: 2018-08-26 | End: 2018-08-29 | Stop reason: HOSPADM

## 2018-08-26 RX ORDER — AMMONIUM LACTATE 12 G/100G
LOTION TOPICAL PRN
Status: DISCONTINUED | OUTPATIENT
Start: 2018-08-26 | End: 2018-08-29 | Stop reason: HOSPADM

## 2018-08-26 RX ORDER — POTASSIUM CHLORIDE 7.45 MG/ML
10 INJECTION INTRAVENOUS PRN
Status: DISCONTINUED | OUTPATIENT
Start: 2018-08-26 | End: 2018-08-29 | Stop reason: HOSPADM

## 2018-08-26 RX ORDER — SIMVASTATIN 10 MG
20 TABLET ORAL NIGHTLY
Status: DISCONTINUED | OUTPATIENT
Start: 2018-08-26 | End: 2018-08-29 | Stop reason: HOSPADM

## 2018-08-26 RX ORDER — TROSPIUM CHLORIDE 20 MG/1
20 TABLET, FILM COATED ORAL 2 TIMES DAILY
Status: DISCONTINUED | OUTPATIENT
Start: 2018-08-26 | End: 2018-08-29 | Stop reason: HOSPADM

## 2018-08-26 RX ORDER — NIACIN 500 MG/1
500 TABLET, EXTENDED RELEASE ORAL NIGHTLY
Status: DISCONTINUED | OUTPATIENT
Start: 2018-08-26 | End: 2018-08-29 | Stop reason: HOSPADM

## 2018-08-26 RX ORDER — DIGOXIN 125 MCG
125 TABLET ORAL DAILY
Status: DISCONTINUED | OUTPATIENT
Start: 2018-08-26 | End: 2018-08-29 | Stop reason: HOSPADM

## 2018-08-26 RX ORDER — POTASSIUM CHLORIDE 20MEQ/15ML
40 LIQUID (ML) ORAL PRN
Status: DISCONTINUED | OUTPATIENT
Start: 2018-08-26 | End: 2018-08-29 | Stop reason: HOSPADM

## 2018-08-26 RX ORDER — FUROSEMIDE 10 MG/ML
40 INJECTION INTRAMUSCULAR; INTRAVENOUS ONCE
Status: COMPLETED | OUTPATIENT
Start: 2018-08-26 | End: 2018-08-26

## 2018-08-26 RX ORDER — DEXTROSE MONOHYDRATE 25 G/50ML
12.5 INJECTION, SOLUTION INTRAVENOUS PRN
Status: DISCONTINUED | OUTPATIENT
Start: 2018-08-26 | End: 2018-08-29 | Stop reason: HOSPADM

## 2018-08-26 RX ORDER — NICOTINE POLACRILEX 4 MG
15 LOZENGE BUCCAL PRN
Status: DISCONTINUED | OUTPATIENT
Start: 2018-08-26 | End: 2018-08-29 | Stop reason: HOSPADM

## 2018-08-26 RX ORDER — METOPROLOL SUCCINATE 50 MG/1
50 TABLET, EXTENDED RELEASE ORAL DAILY
Status: DISCONTINUED | OUTPATIENT
Start: 2018-08-26 | End: 2018-08-29 | Stop reason: HOSPADM

## 2018-08-26 RX ORDER — ESCITALOPRAM OXALATE 10 MG/1
10 TABLET ORAL NIGHTLY
Status: DISCONTINUED | OUTPATIENT
Start: 2018-08-26 | End: 2018-08-29 | Stop reason: HOSPADM

## 2018-08-26 RX ADMIN — VENLAFAXINE HYDROCHLORIDE 37.5 MG: 37.5 CAPSULE, EXTENDED RELEASE ORAL at 08:24

## 2018-08-26 RX ADMIN — FUROSEMIDE 40 MG: 10 INJECTION, SOLUTION INTRAMUSCULAR; INTRAVENOUS at 03:36

## 2018-08-26 RX ADMIN — Medication 10 ML: at 08:26

## 2018-08-26 RX ADMIN — HYDROCHLOROTHIAZIDE 25 MG: 25 TABLET ORAL at 08:24

## 2018-08-26 RX ADMIN — DABIGATRAN ETEXILATE MESYLATE 150 MG: 150 CAPSULE ORAL at 08:27

## 2018-08-26 RX ADMIN — LOSARTAN POTASSIUM 50 MG: 25 TABLET, FILM COATED ORAL at 08:24

## 2018-08-26 RX ADMIN — ESCITALOPRAM OXALATE 10 MG: 10 TABLET ORAL at 20:59

## 2018-08-26 RX ADMIN — SPIRONOLACTONE 25 MG: 25 TABLET ORAL at 08:24

## 2018-08-26 RX ADMIN — PANTOPRAZOLE SODIUM 40 MG: 40 TABLET, DELAYED RELEASE ORAL at 08:25

## 2018-08-26 RX ADMIN — ACETAMINOPHEN 650 MG: 325 TABLET ORAL at 22:50

## 2018-08-26 RX ADMIN — SIMVASTATIN 20 MG: 10 TABLET, FILM COATED ORAL at 20:59

## 2018-08-26 RX ADMIN — CEPHALEXIN 250 MG: 250 CAPSULE ORAL at 08:24

## 2018-08-26 RX ADMIN — TROSPIUM CHLORIDE 20 MG: 20 TABLET ORAL at 08:27

## 2018-08-26 RX ADMIN — DABIGATRAN ETEXILATE MESYLATE 150 MG: 150 CAPSULE ORAL at 21:00

## 2018-08-26 RX ADMIN — NIACIN 500 MG: 500 TABLET, EXTENDED RELEASE ORAL at 20:59

## 2018-08-26 RX ADMIN — TROSPIUM CHLORIDE 20 MG: 20 TABLET ORAL at 21:00

## 2018-08-26 RX ADMIN — MULTIPLE VITAMINS W/ MINERALS TAB 1 TABLET: TAB at 08:24

## 2018-08-26 RX ADMIN — METOPROLOL SUCCINATE 50 MG: 50 TABLET, EXTENDED RELEASE ORAL at 08:25

## 2018-08-26 RX ADMIN — DIGOXIN 125 MCG: 125 TABLET ORAL at 08:24

## 2018-08-26 ASSESSMENT — PAIN SCALES - GENERAL
PAINLEVEL_OUTOF10: 3
PAINLEVEL_OUTOF10: 0
PAINLEVEL_OUTOF10: 0

## 2018-08-26 NOTE — H&P
0.2 % SOLN ophthalmic solution Apply 1 drop to eye daily Both eyes    Historical Provider, MD   niacin 500 MG extended release capsule Take 500 mg by mouth nightly    Historical Provider, MD   Fesoterodine Fumarate ER (TOVIAZ) 8 MG TB24 Take 1 tablet by mouth daily 11/11/16   Maryse Rios MD   ammonium lactate (LAC-HYDRIN) 12 % lotion Apply topically daily. Patient taking differently: Apply topically as needed Apply topically daily. 5/5/16   Maryse Rios MD       Allergies:  Tape [place] tape] and Erythromycin    Social History:      The patient currently lives at home. TOBACCO:   reports that she has never smoked. She has never used smokeless tobacco.  ETOH:   reports that she does not drink alcohol. Family History:      History reviewed. No pertinent family history. REVIEW OF SYSTEMS:   Pertinent positives as noted in the HPI. All other systems reviewed and negative. PHYSICAL EXAM PERFORMED:    BP (!) 162/75   Pulse 89   Temp 98.7 °F (37.1 °C) (Oral)   Resp 20   Ht 5' (1.524 m)   Wt 289 lb 4 oz (131.2 kg)   SpO2 92%   BMI 56.49 kg/m²     General appearance:  Elderly, morbidly obese WF lying in bed in NAD. HEENT:  Normal cephalic, atraumatic without obvious deformity. Pupils equal, round, and reactive to light. Extra ocular muscles intact. Conjunctivae/corneas clear. Neck: Supple, with full range of motion. No jugular venous distention. Trachea midline. Respiratory:  No respiratory distress. On 2 L O2 NC. Cardiovascular:  Regular rate and rhythm with normal S1/S2 without murmurs, rubs or gallops. Abdomen: Soft, non-tender, non-distended with normal bowel sounds. Musculoskeletal:  No clubbing, cyanosis or edema bilaterally. Full range of motion without deformity. Skin: Skin color, texture, turgor normal.  No rashes or lesions. Neurologic:  Neurovascularly intact without any focal sensory/motor deficits.  Cranial nerves: II-XII intact, grossly non-focal.  Psychiatric: Alert and oriented, thought content appropriate, normal insight  Capillary Refill: Brisk,< 3 seconds   Peripheral Pulses: +2 palpable, equal bilaterally       Labs:     Recent Labs      08/25/18 2316   WBC  10.2   HGB  14.1   HCT  41.2   PLT  236     Recent Labs      08/25/18 2316   NA  136   K  4.2   CL  96*   CO2  27   BUN  11   CREATININE  <0.5*   CALCIUM  9.6     Recent Labs      08/25/18 2316   AST  26   ALT  23   BILITOT  0.8   ALKPHOS  105     No results for input(s): INR in the last 72 hours. Recent Labs      08/25/18 2316   TROPONINI  <0.01       Urinalysis:      Lab Results   Component Value Date    NITRU Negative 05/09/2018    WBCUA 10-20 05/09/2018    BACTERIA 3+ 05/09/2018    RBCUA None seen 05/09/2018    BLOODU Negative 05/09/2018    SPECGRAV <=1.005 05/09/2018    GLUCOSEU Negative 05/09/2018       Radiology:       XR CHEST PORTABLE   Final Result   Moderate interstitial edema. ASSESSMENT:    Active Hospital Problems    Diagnosis Date Noted    Abdominal pain [R10.9] 08/26/2018    SOB (shortness of breath) [R06.02] 08/26/2018    Pulmonary interstitial edema on CXR [R60.9] 08/26/2018    Elevated brain natriuretic peptide (BNP) level [R79.89] 08/26/2018    QTc prolongation [R94.31] 08/26/2018    Morbid obesity with BMI of 50.0-59.9, adult (HCC) [E66.01, Z68.43] 08/26/2018    Anticoagulated on Pradaxa [Z79.01] 08/26/2018    CAD (coronary artery disease) [I25.10]     Hemiplegia affecting nondominant side s/p CVA [G81.90]     Hyperlipidemia [E78.5]     DM2 (diabetes mellitus, type 2) ([facility] [address] [E11.9] 10/14/2016    Paroxysmal atrial fibrillation ([facility] [address] [I48.0] 10/27/2015    Essential hypertension [I10] 10/27/2015    Depression [F32.9] 07/24/2015         PLAN:    SOB; ?Hypoxemia. - given CXR finding of interstitial edema and elevated BNP (in the setting of normal renal function), I will assume that CHF is the etiology.  She does not meet SIRS criteria and does not appear to have

## 2018-08-26 NOTE — CARE COORDINATION
INTERDISCIPLINARY PLAN OF CARE CONFERENCE    Date/Time: 8/26/2018 7:48 PM  Completed by: Gabby Costello Case Management      Patient Name:  Laura Dorantes  YOB: 1933  Admitting Diagnosis: SOB (shortness of breath) [R06.02]     Admit Date/Time:  8/25/2018 10:30 PM    Chart reviewed. Interdisciplinary team met to discuss patient progress and discharge plans. Disciplines included Case Management, Nursing, and Dietitian. Current Status: stable    Anticipated Discharge Date: 8/27/18  Expected D/C Disposition:  Home  Confirmed plan with patient and/or family Yes  Discharge Plan Comments:   Spoke with patient and family regarding continued stay in hospital today. She is observation status. She needs to have a test done in am and will go home after that. She is bed bound but her  can transfer her to [place] [address] and take home in [place] on 8/27/18.           Home O2 in place on admit: No  Pt informed of need to bring portable home O2 tank on day of discharge for nursing to connect prior to leaving:  Not Indicated  Verbalized agreement/Understanding:  Not Indicated

## 2018-08-26 NOTE — PROGRESS NOTES
Pt turned, repositioned, lozano cath in place with securing device. Pt is now resting on right side with pillow support. Skin assessed and is WNL and unremarkable. Coccyx pink/intact no need for preventative at this time. BLE elevated on pillows. Denies further needs. Call light within reach. Bed alarm is on. Will monitor.

## 2018-08-26 NOTE — DISCHARGE SUMMARY
Hospital Medicine Discharge Summary    Patient ID: Steve Grace      Patient's PCP: Eugenio Desai MD    Admit Date: 8/25/2018     Discharge Date:   8/26/2018    Admitting Physician: Candy Worrell MD     Discharge Physician: Any Hughes MD     Discharge Diagnoses: Active Hospital Problems    Diagnosis Date Noted    Abdominal pain [R10.9] 08/26/2018    SOB (shortness of breath) [R06.02] 08/26/2018    Pulmonary interstitial edema on CXR [R60.9] 08/26/2018    Elevated brain natriuretic peptide (BNP) level [R79.89] 08/26/2018    QTc prolongation [R94.31] 08/26/2018    Morbid obesity with BMI of 50.0-59.9, adult (HCC) [E66.01, Z68.43] 08/26/2018    Anticoagulated on Pradaxa [Z79.01] 08/26/2018    CAD (coronary artery disease) [I25.10]     Hemiplegia affecting nondominant side s/p CVA [G81.90]     Hyperlipidemia [E78.5]     DM2 (diabetes mellitus, type 2) ([facility] [address] [E11.9] 10/14/2016    Paroxysmal atrial fibrillation ([facility] [address] [I48.0] 10/27/2015    Essential hypertension [I10] 10/27/2015    Depression [F32.9] 07/24/2015       The patient was seen and examined on day of discharge and this discharge summary is in conjunction with any daily progress note from day of discharge. Hospital Course: R side upper abdominal pain started while her  was helping cleaning/bathing her.        Transient R-Sided Abd Pain. - suspect musculoskeletal   - LFT normal.   - No active GI symptoms.          PAF. - monitor on tele. - continue home digoxin, Toprol, and Pradaxa.        DM2.  - diabetic diet. - Accuchecks and SSI.        Hemiplegia s/p CVA. - pt is cared for by her  at home.        Morbid Obesity.  - encourage weight loss.   - dietary consult.            Physical Exam Performed:     BP (!) 154/85   Pulse 89   Temp 97.2 °F (36.2 °C) (Oral)   Resp 20   Ht 5' (1.524 m)   Wt 289 lb 4 oz (131.2 kg)   SpO2 95%   BMI 56.49 kg/m²       General appearance:  Elderly,

## 2018-08-26 NOTE — ED PROVIDER NOTES
[address] ED  eMERGENCY dEPARTMENT eNCOUnter        Pt Name: Abhinav Humphries  MRN: [id number]  [place] 11/14/1933  Date of evaluation: 8/25/2018  Provider: Bryan Neves MD  PCP: Avni Nicole MD      CHIEF COMPLAINT       Chief Complaint   Patient presents with    Abdominal Pain     transferred by EMS from private residence, pt reports that she started having r side abd pain and sob, both resolved at this time. HISTORY OF PRESENT ILLNESS   (Location/Symptom, Timing/Onset, Context/Setting, Quality, Duration, Modifying Factors, Severity)  Note limiting factors. Abhinav Humphries is a 80 y.o. female  Present complaints of shortness of breath and maybe some right-sided rib pain that has subsequently gone away but is still feeling short of breath she is on digoxin for atrial fibrillation she still complains of shortness of breath but no pain she does not have a history of congestive heart failure    Nursing Notes were all reviewed and agreed with or any disagreements were addressed  in the HPI. REVIEW OF SYSTEMS    (2-9 systems for level 4, 10 or more for level 5)     Review of Systems    Positives and Pertinent negatives as per HPI. Except as noted above in the ROS, all other systems were reviewed and negative.        PAST MEDICAL HISTORY     Past Medical History:   Diagnosis Date    Atrial fibrillation ([address]     CAD (coronary artery disease)     Cancer ([address]     skin cancer    Diabetes mellitus (HCC)     GERD (gastroesophageal reflux disease)     Hemiplegia, unspecified, affecting nondominant side     History of blood transfusion     Hyperlipidemia     Hypertension     Other disorders of kidney and ureter in diseases classified elsewhere     Psychiatric problem     Unspecified cerebral artery occlusion with cerebral infarction 2012    Unspecified cerebral artery occlusion with cerebral infarction          SURGICAL HISTORY       Past Surgical History:   Procedure Laterality Date    CHOLECYSTECTOMY      CYSTOSCOPY  08/10/2017    DILATION AND CURETTAGE OF UTERUS  12/06/2016    HEMORRHOID SURGERY      KNEE ARTHROSCOPY      bilateral    SKIN BIOPSY           CURRENT MEDICATIONS       Previous Medications    AMMONIUM LACTATE (LAC-HYDRIN) 12 % LOTION    Apply topically daily. BLOOD GLUCOSE MONITORING SUPPL (ONE TOUCH BASIC SYSTEM) W/DEVICE KIT    Patient tests once daily. DX:E11.9    CEPHALEXIN (KEFLEX) 250 MG CAPSULE    Take 250 mg by mouth daily    DABIGATRAN (PRADAXA) 150 MG CAPSULE    Take 150 mg by mouth    DIGOXIN (LANOXIN) 125 MCG TABLET    Take 125 mcg by mouth daily    ESCITALOPRAM (LEXAPRO) 20 MG TABLET    TAKE 1 TABLET NIGHTLY    FESOTERODINE FUMARATE ER (TOVIAZ) 8 MG TB24    Take 1 tablet by mouth daily    GLUCOSE BLOOD VI TEST STRIPS (ONE TOUCH TEST STRIPS) STRIP    Patient tests once daily. DX: E11.9    LOSARTAN (COZAAR) 50 MG TABLET    Take 50 mg by mouth daily    METFORMIN (GLUCOPHAGE) 500 MG TABLET    Take 1 tablet by mouth daily (with breakfast)    METOPROLOL SUCCINATE (TOPROL XL) 50 MG EXTENDED RELEASE TABLET    Take 1 tablet by mouth daily    MULTIPLE VITAMINS-MINERALS-FA (OCUVEL PO)    Take 1 tablet by mouth daily    NIACIN 500 MG EXTENDED RELEASE CAPSULE    Take 500 mg by mouth nightly    OLOPATADINE (PATADAY) 0.2 % SOLN OPHTHALMIC SOLUTION    Apply 1 drop to eye daily Both eyes    OMEPRAZOLE (PRILOSEC) 40 MG DELAYED RELEASE CAPSULE    TAKE 1 CAPSULE DAILY    OMEPRAZOLE (PRILOSEC) 40 MG DELAYED RELEASE CAPSULE    TAKE 1 CAPSULE DAILY    ONE TOUCH LANCETS MISC    Patient tests once daily.   DX:E11.9    SIMVASTATIN (ZOCOR) 20 MG TABLET    Take 1 tablet by mouth nightly    SPIRONOLACTONE-HYDROCHLOROTHIAZIDE (ALDACTAZIDE) 25-25 MG PER TABLET    TAKE 1 TABLET DAILY    VENLAFAXINE (EFFEXOR XR) 37.5 MG EXTENDED RELEASE CAPSULE    Take 1 capsule by mouth daily       ALLERGIES     Tape [adhesive tape] and Erythromycin    FAMILY HISTORY (!) 148/89   Pulse: 89 85   Resp: 22 19   Temp: 98.9 °F (37.2 °C) 98.3 °F (36.8 °C)   SpO2: 95% 92%   Weight: 300 lb (136.1 kg)    Height: 5' (1.524 m)        Patient was given the following medications:  Medications   ondansetron (ZOFRAN) injection 4 mg (not administered)   0.9 % sodium chloride bolus (0 mLs Intravenous Stopped 8/26/18 0036)           The patient tolerated their visit well. The patient and / or the family were informed of the results of any tests, a time was given to answer questions. FINAL IMPRESSION      1. Acute systolic congestive heart failure [facility])          DISPOSITION/PLAN   DISPOSITION Decision To Admit 08/25/2018 11:59:51 PM      PATIENT REFERRED TO:  No follow-up provider specified.     DISCHARGE MEDICATIONS:  New Prescriptions    No medications on file       DISCONTINUED MEDICATIONS:  Discontinued Medications    No medications on file              (Please note that portions of this note were completed with a voice recognition program.  Efforts were made to edit the dictations but occasionally words are mis-transcribed.)    Dann Victor MD (electronically signed)      Dann Victor MD  08/26/18 6831

## 2018-08-26 NOTE — PROGRESS NOTES
Patient admitted to floor from ED via stretcher with RN at bedside. Patient assisted to bed and tele monitor applied. Pt is AFib rate controlled. VSS. Admission completed and assessment charted. Went over [address] with patient and family. Four eyes skin assessment completed, see note. Oriented patient to room and call light. Pt encouraged to call with any needs. Bed is in lowest position with side rails up and wheels locked. Will monitor.

## 2018-08-26 NOTE — PROGRESS NOTES
Perez catheter removed at this time. Pt tolerated well. 10 ml's removed from balloon. Denied any pain or discomfort. Pt repositioned, attends applied, and wipes used. Pillow support to LUE & BLE. Family at the bedside. Call light within reach. Bed alarm is on. Will monitor.

## 2018-08-26 NOTE — PLAN OF CARE
Problem: Falls - Risk of:  Goal: Will remain free from falls  Will remain free from falls   Outcome: Ongoing  Pt is alert and oriented x4 and has remained free from falls today at this point. Pt is a high fall risk. Arm band in place and bed check on. Call light is within reach. Problem: Risk for Impaired Skin Integrity  Goal: Tissue integrity - skin and mucous membranes  Structural intactness and normal physiological function of skin and  mucous membranes. Outcome: Ongoing  Pt is turned, repositioned, and pillow support every 2 hours. No skin issues, skin assessed this AM.     Problem: Mobility - Impaired:  Goal: Mobility will improve  Mobility will improve  Outcome: Ongoing  Pt is bed bound with left sided weakness related to previous stroke.

## 2018-08-26 NOTE — PROGRESS NOTES
Family concerned regarding discharge order. Spoke to Dr. Zoe Etienne MD via telephone regarding family concerns. Stated he would come to bedside to speak to family.

## 2018-08-27 LAB
ALBUMIN SERPL-MCNC: 3.2 G/DL (ref 3.4–5)
ANION GAP SERPL CALCULATED.3IONS-SCNC: 11 MMOL/L (ref 3–16)
BASOPHILS ABSOLUTE: 0 K/UL (ref 0–0.2)
BASOPHILS RELATIVE PERCENT: 0.6 %
BUN BLDV-MCNC: 13 MG/DL (ref 7–20)
CALCIUM SERPL-MCNC: 9 MG/DL (ref 8.3–10.6)
CHLORIDE BLD-SCNC: 100 MMOL/L (ref 99–110)
CO2: 28 MMOL/L (ref 21–32)
CREAT SERPL-MCNC: <0.5 MG/DL (ref 0.6–1.2)
EKG ATRIAL RATE: 98 BPM
EKG DIAGNOSIS: NORMAL
EKG Q-T INTERVAL: 380 MS
EKG QRS DURATION: 90 MS
EKG QTC CALCULATION (BAZETT): 485 MS
EKG R AXIS: -61 DEGREES
EKG T AXIS: 121 DEGREES
EKG VENTRICULAR RATE: 98 BPM
EOSINOPHILS ABSOLUTE: 0.3 K/UL (ref 0–0.6)
EOSINOPHILS RELATIVE PERCENT: 4.6 %
ESTIMATED AVERAGE GLUCOSE: 139.9 MG/DL
GFR AFRICAN AMERICAN: >60
GFR NON-AFRICAN AMERICAN: >60
GLUCOSE BLD-MCNC: 126 MG/DL (ref 70–99)
GLUCOSE BLD-MCNC: 133 MG/DL (ref 70–99)
GLUCOSE BLD-MCNC: 134 MG/DL (ref 70–99)
GLUCOSE BLD-MCNC: 152 MG/DL (ref 70–99)
GLUCOSE BLD-MCNC: 154 MG/DL (ref 70–99)
HBA1C MFR BLD: 6.5 %
HCT VFR BLD CALC: 39.1 % (ref 36–48)
HEMOGLOBIN: 13.3 G/DL (ref 12–16)
LV EF: 63 %
LVEF MODALITY: NORMAL
LYMPHOCYTES ABSOLUTE: 1.8 K/UL (ref 1–5.1)
LYMPHOCYTES RELATIVE PERCENT: 26.8 %
MAGNESIUM: 2 MG/DL (ref 1.8–2.4)
MCH RBC QN AUTO: 31.5 PG (ref 26–34)
MCHC RBC AUTO-ENTMCNC: 34 G/DL (ref 31–36)
MCV RBC AUTO: 92.5 FL (ref 80–100)
MONOCYTES ABSOLUTE: 0.6 K/UL (ref 0–1.3)
MONOCYTES RELATIVE PERCENT: 9.1 %
NEUTROPHILS ABSOLUTE: 3.9 K/UL (ref 1.7–7.7)
NEUTROPHILS RELATIVE PERCENT: 58.9 %
PDW BLD-RTO: 13.5 % (ref 12.4–15.4)
PERFORMED ON: ABNORMAL
PHOSPHORUS: 4.3 MG/DL (ref 2.5–4.9)
PLATELET # BLD: 193 K/UL (ref 135–450)
PMV BLD AUTO: 7.7 FL (ref 5–10.5)
POTASSIUM SERPL-SCNC: 3.4 MMOL/L (ref 3.5–5.1)
PRO-BNP: 723 PG/ML (ref 0–449)
RBC # BLD: 4.23 M/UL (ref 4–5.2)
SODIUM BLD-SCNC: 139 MMOL/L (ref 136–145)
WBC # BLD: 6.7 K/UL (ref 4–11)

## 2018-08-27 PROCEDURE — 94761 N-INVAS EAR/PLS OXIMETRY MLT: CPT

## 2018-08-27 PROCEDURE — 85025 COMPLETE CBC W/AUTO DIFF WBC: CPT

## 2018-08-27 PROCEDURE — 83735 ASSAY OF MAGNESIUM: CPT

## 2018-08-27 PROCEDURE — 80069 RENAL FUNCTION PANEL: CPT

## 2018-08-27 PROCEDURE — 36415 COLL VENOUS BLD VENIPUNCTURE: CPT

## 2018-08-27 PROCEDURE — 1200000000 HC SEMI PRIVATE

## 2018-08-27 PROCEDURE — 83036 HEMOGLOBIN GLYCOSYLATED A1C: CPT

## 2018-08-27 PROCEDURE — 2580000003 HC RX 258: Performed by: HOSPITALIST

## 2018-08-27 PROCEDURE — 6360000002 HC RX W HCPCS: Performed by: INTERNAL MEDICINE

## 2018-08-27 PROCEDURE — 6370000000 HC RX 637 (ALT 250 FOR IP): Performed by: HOSPITALIST

## 2018-08-27 PROCEDURE — 83880 ASSAY OF NATRIURETIC PEPTIDE: CPT

## 2018-08-27 PROCEDURE — 99232 SBSQ HOSP IP/OBS MODERATE 35: CPT | Performed by: INTERNAL MEDICINE

## 2018-08-27 PROCEDURE — 2700000000 HC OXYGEN THERAPY PER DAY

## 2018-08-27 PROCEDURE — 93306 TTE W/DOPPLER COMPLETE: CPT

## 2018-08-27 RX ORDER — FUROSEMIDE 10 MG/ML
40 INJECTION INTRAMUSCULAR; INTRAVENOUS DAILY
Status: DISCONTINUED | OUTPATIENT
Start: 2018-08-27 | End: 2018-08-29

## 2018-08-27 RX ADMIN — DABIGATRAN ETEXILATE MESYLATE 150 MG: 150 CAPSULE ORAL at 09:11

## 2018-08-27 RX ADMIN — SPIRONOLACTONE 25 MG: 25 TABLET ORAL at 09:04

## 2018-08-27 RX ADMIN — METOPROLOL SUCCINATE 50 MG: 50 TABLET, EXTENDED RELEASE ORAL at 09:04

## 2018-08-27 RX ADMIN — ESCITALOPRAM OXALATE 10 MG: 10 TABLET ORAL at 21:51

## 2018-08-27 RX ADMIN — HYDROCHLOROTHIAZIDE 25 MG: 25 TABLET ORAL at 09:04

## 2018-08-27 RX ADMIN — TROSPIUM CHLORIDE 20 MG: 20 TABLET ORAL at 09:10

## 2018-08-27 RX ADMIN — MAGNESIUM HYDROXIDE 30 ML: 400 SUSPENSION ORAL at 21:52

## 2018-08-27 RX ADMIN — SIMVASTATIN 20 MG: 10 TABLET, FILM COATED ORAL at 21:51

## 2018-08-27 RX ADMIN — DIGOXIN 125 MCG: 125 TABLET ORAL at 09:04

## 2018-08-27 RX ADMIN — CEPHALEXIN 250 MG: 250 CAPSULE ORAL at 09:04

## 2018-08-27 RX ADMIN — NIACIN 500 MG: 500 TABLET, EXTENDED RELEASE ORAL at 21:55

## 2018-08-27 RX ADMIN — POTASSIUM CHLORIDE 40 MEQ: 1500 TABLET, EXTENDED RELEASE ORAL at 09:04

## 2018-08-27 RX ADMIN — Medication 10 ML: at 23:29

## 2018-08-27 RX ADMIN — Medication 10 ML: at 09:05

## 2018-08-27 RX ADMIN — MULTIPLE VITAMINS W/ MINERALS TAB 1 TABLET: TAB at 09:04

## 2018-08-27 RX ADMIN — FUROSEMIDE 40 MG: 10 INJECTION, SOLUTION INTRAMUSCULAR; INTRAVENOUS at 11:10

## 2018-08-27 RX ADMIN — DABIGATRAN ETEXILATE MESYLATE 150 MG: 150 CAPSULE ORAL at 21:51

## 2018-08-27 RX ADMIN — INSULIN LISPRO 1 UNITS: 100 INJECTION, SOLUTION INTRAVENOUS; SUBCUTANEOUS at 21:55

## 2018-08-27 RX ADMIN — PANTOPRAZOLE SODIUM 40 MG: 40 TABLET, DELAYED RELEASE ORAL at 05:48

## 2018-08-27 RX ADMIN — TROSPIUM CHLORIDE 20 MG: 20 TABLET ORAL at 21:52

## 2018-08-27 RX ADMIN — LOSARTAN POTASSIUM 50 MG: 25 TABLET, FILM COATED ORAL at 09:05

## 2018-08-27 RX ADMIN — VENLAFAXINE HYDROCHLORIDE 37.5 MG: 37.5 CAPSULE, EXTENDED RELEASE ORAL at 09:04

## 2018-08-27 NOTE — FLOWSHEET NOTE
08/26/18 2313   Vital Signs   Temp 98 °F (36.7 °C)   Temp Source Oral   Pulse 90   Heart Rate Source Monitor   Resp 18   BP (!) 140/65   BP Location Right lower arm   BP Upper/Lower Lower   Patient Position Semi fowlers   Level of Consciousness 0   MEWS Score 1   Oxygen Therapy   SpO2 97 %   O2 Device Nasal cannula   O2 Flow Rate (L/min) 1 L/min   resting in bed, no acute distress.  [facility]

## 2018-08-27 NOTE — PROGRESS NOTES
Bedside report given and pt care transferred to [facility]. Pt denies needs at this time. Call light within reach.

## 2018-08-28 LAB
ALBUMIN SERPL-MCNC: 3.2 G/DL (ref 3.4–5)
ANION GAP SERPL CALCULATED.3IONS-SCNC: 12 MMOL/L (ref 3–16)
BACTERIA: ABNORMAL /HPF
BASOPHILS ABSOLUTE: 0 K/UL (ref 0–0.2)
BASOPHILS RELATIVE PERCENT: 0.5 %
BILIRUBIN URINE: NEGATIVE
BLOOD, URINE: NEGATIVE
BUN BLDV-MCNC: 15 MG/DL (ref 7–20)
CALCIUM SERPL-MCNC: 8.8 MG/DL (ref 8.3–10.6)
CHLORIDE BLD-SCNC: 93 MMOL/L (ref 99–110)
CLARITY: CLEAR
CO2: 28 MMOL/L (ref 21–32)
COLOR: YELLOW
CREAT SERPL-MCNC: 0.6 MG/DL (ref 0.6–1.2)
EOSINOPHILS ABSOLUTE: 0.4 K/UL (ref 0–0.6)
EOSINOPHILS RELATIVE PERCENT: 4.7 %
EPITHELIAL CELLS, UA: ABNORMAL /HPF
GFR AFRICAN AMERICAN: >60
GFR NON-AFRICAN AMERICAN: >60
GLUCOSE BLD-MCNC: 127 MG/DL (ref 70–99)
GLUCOSE BLD-MCNC: 130 MG/DL (ref 70–99)
GLUCOSE BLD-MCNC: 141 MG/DL (ref 70–99)
GLUCOSE BLD-MCNC: 143 MG/DL (ref 70–99)
GLUCOSE BLD-MCNC: 165 MG/DL (ref 70–99)
GLUCOSE URINE: NEGATIVE MG/DL
HCT VFR BLD CALC: 39.7 % (ref 36–48)
HEMOGLOBIN: 13.6 G/DL (ref 12–16)
KETONES, URINE: NEGATIVE MG/DL
LEUKOCYTE ESTERASE, URINE: ABNORMAL
LYMPHOCYTES ABSOLUTE: 1.9 K/UL (ref 1–5.1)
LYMPHOCYTES RELATIVE PERCENT: 24.9 %
MAGNESIUM: 2.2 MG/DL (ref 1.8–2.4)
MCH RBC QN AUTO: 31.4 PG (ref 26–34)
MCHC RBC AUTO-ENTMCNC: 34.3 G/DL (ref 31–36)
MCV RBC AUTO: 91.4 FL (ref 80–100)
MICROSCOPIC EXAMINATION: YES
MONOCYTES ABSOLUTE: 0.7 K/UL (ref 0–1.3)
MONOCYTES RELATIVE PERCENT: 9.6 %
NEUTROPHILS ABSOLUTE: 4.7 K/UL (ref 1.7–7.7)
NEUTROPHILS RELATIVE PERCENT: 60.3 %
NITRITE, URINE: NEGATIVE
PDW BLD-RTO: 13.6 % (ref 12.4–15.4)
PERFORMED ON: ABNORMAL
PH UA: 5.5
PHOSPHORUS: 3.8 MG/DL (ref 2.5–4.9)
PLATELET # BLD: 226 K/UL (ref 135–450)
PMV BLD AUTO: 7.5 FL (ref 5–10.5)
POTASSIUM SERPL-SCNC: 3.6 MMOL/L (ref 3.5–5.1)
PRO-BNP: 418 PG/ML (ref 0–449)
PROTEIN UA: NEGATIVE MG/DL
RBC # BLD: 4.34 M/UL (ref 4–5.2)
RBC UA: ABNORMAL /HPF (ref 0–2)
SODIUM BLD-SCNC: 133 MMOL/L (ref 136–145)
SPECIFIC GRAVITY UA: 1.01
URINE TYPE: ABNORMAL
UROBILINOGEN, URINE: 0.2 E.U./DL
WBC # BLD: 7.7 K/UL (ref 4–11)
WBC UA: ABNORMAL /HPF (ref 0–5)

## 2018-08-28 PROCEDURE — 97535 SELF CARE MNGMENT TRAINING: CPT

## 2018-08-28 PROCEDURE — 2700000000 HC OXYGEN THERAPY PER DAY

## 2018-08-28 PROCEDURE — G8988 SELF CARE GOAL STATUS: HCPCS

## 2018-08-28 PROCEDURE — 97530 THERAPEUTIC ACTIVITIES: CPT

## 2018-08-28 PROCEDURE — 6360000002 HC RX W HCPCS: Performed by: INTERNAL MEDICINE

## 2018-08-28 PROCEDURE — 97166 OT EVAL MOD COMPLEX 45 MIN: CPT

## 2018-08-28 PROCEDURE — 99232 SBSQ HOSP IP/OBS MODERATE 35: CPT | Performed by: INTERNAL MEDICINE

## 2018-08-28 PROCEDURE — 2580000003 HC RX 258: Performed by: HOSPITALIST

## 2018-08-28 PROCEDURE — 87186 SC STD MICRODIL/AGAR DIL: CPT

## 2018-08-28 PROCEDURE — 97162 PT EVAL MOD COMPLEX 30 MIN: CPT

## 2018-08-28 PROCEDURE — 80069 RENAL FUNCTION PANEL: CPT

## 2018-08-28 PROCEDURE — 1200000000 HC SEMI PRIVATE

## 2018-08-28 PROCEDURE — 87086 URINE CULTURE/COLONY COUNT: CPT

## 2018-08-28 PROCEDURE — G8981 BODY POS CURRENT STATUS: HCPCS

## 2018-08-28 PROCEDURE — 6370000000 HC RX 637 (ALT 250 FOR IP): Performed by: HOSPITALIST

## 2018-08-28 PROCEDURE — 94761 N-INVAS EAR/PLS OXIMETRY MLT: CPT

## 2018-08-28 PROCEDURE — 81001 URINALYSIS AUTO W/SCOPE: CPT

## 2018-08-28 PROCEDURE — 97112 NEUROMUSCULAR REEDUCATION: CPT

## 2018-08-28 PROCEDURE — 83735 ASSAY OF MAGNESIUM: CPT

## 2018-08-28 PROCEDURE — G8982 BODY POS GOAL STATUS: HCPCS

## 2018-08-28 PROCEDURE — G8987 SELF CARE CURRENT STATUS: HCPCS

## 2018-08-28 PROCEDURE — 36415 COLL VENOUS BLD VENIPUNCTURE: CPT

## 2018-08-28 PROCEDURE — 83880 ASSAY OF NATRIURETIC PEPTIDE: CPT

## 2018-08-28 PROCEDURE — 85025 COMPLETE CBC W/AUTO DIFF WBC: CPT

## 2018-08-28 PROCEDURE — 87077 CULTURE AEROBIC IDENTIFY: CPT

## 2018-08-28 RX ORDER — FUROSEMIDE 40 MG/1
40 TABLET ORAL DAILY
Qty: 30 TABLET | Refills: 0 | Status: SHIPPED | OUTPATIENT
Start: 2018-08-28 | End: 2018-09-14 | Stop reason: SDUPTHER

## 2018-08-28 RX ORDER — ESCITALOPRAM OXALATE 20 MG/1
TABLET ORAL
Qty: 90 TABLET | Refills: 0 | Status: SHIPPED | OUTPATIENT
Start: 2018-08-28 | End: 2019-01-21 | Stop reason: SDUPTHER

## 2018-08-28 RX ORDER — VENLAFAXINE HYDROCHLORIDE 37.5 MG/1
CAPSULE, EXTENDED RELEASE ORAL
Qty: 90 CAPSULE | Refills: 0 | Status: SHIPPED | OUTPATIENT
Start: 2018-08-28 | End: 2018-09-19

## 2018-08-28 RX ADMIN — TROSPIUM CHLORIDE 20 MG: 20 TABLET ORAL at 20:23

## 2018-08-28 RX ADMIN — NIACIN 500 MG: 500 TABLET, EXTENDED RELEASE ORAL at 20:23

## 2018-08-28 RX ADMIN — DABIGATRAN ETEXILATE MESYLATE 150 MG: 150 CAPSULE ORAL at 08:25

## 2018-08-28 RX ADMIN — SIMVASTATIN 20 MG: 10 TABLET, FILM COATED ORAL at 20:23

## 2018-08-28 RX ADMIN — TROSPIUM CHLORIDE 20 MG: 20 TABLET ORAL at 08:25

## 2018-08-28 RX ADMIN — LOSARTAN POTASSIUM 50 MG: 25 TABLET, FILM COATED ORAL at 08:26

## 2018-08-28 RX ADMIN — ESCITALOPRAM OXALATE 10 MG: 10 TABLET ORAL at 20:23

## 2018-08-28 RX ADMIN — DABIGATRAN ETEXILATE MESYLATE 150 MG: 150 CAPSULE ORAL at 20:23

## 2018-08-28 RX ADMIN — SPIRONOLACTONE 25 MG: 25 TABLET ORAL at 08:26

## 2018-08-28 RX ADMIN — METOPROLOL SUCCINATE 50 MG: 50 TABLET, EXTENDED RELEASE ORAL at 08:26

## 2018-08-28 RX ADMIN — MULTIPLE VITAMINS W/ MINERALS TAB 1 TABLET: TAB at 08:26

## 2018-08-28 RX ADMIN — HYDROCHLOROTHIAZIDE 25 MG: 25 TABLET ORAL at 08:26

## 2018-08-28 RX ADMIN — FUROSEMIDE 40 MG: 10 INJECTION, SOLUTION INTRAMUSCULAR; INTRAVENOUS at 08:26

## 2018-08-28 RX ADMIN — DIGOXIN 125 MCG: 125 TABLET ORAL at 08:26

## 2018-08-28 RX ADMIN — VENLAFAXINE HYDROCHLORIDE 37.5 MG: 37.5 CAPSULE, EXTENDED RELEASE ORAL at 08:26

## 2018-08-28 RX ADMIN — PANTOPRAZOLE SODIUM 40 MG: 40 TABLET, DELAYED RELEASE ORAL at 05:47

## 2018-08-28 RX ADMIN — Medication 10 ML: at 08:27

## 2018-08-28 RX ADMIN — CEPHALEXIN 250 MG: 250 CAPSULE ORAL at 08:25

## 2018-08-28 ASSESSMENT — PAIN SCALES - GENERAL: PAINLEVEL_OUTOF10: 0

## 2018-08-28 NOTE — CONSULTS
's well being and safety within their home and agrees that SNF placement is the safest discharge plan. Case management aware of patient's situation and is actively following them. Noted most recent outpatient weight to be: 277 lbs on 8-28-18     Last three weights hospital weights reviewed:    Patient Vitals for the past 96 hrs (Last 3 readings):   Weight   08/28/18 0305 277 lb 6.4 oz (125.8 kg)   08/27/18 0322 285 lb 3.2 oz (129.4 kg)   08/26/18 0242 289 lb 4 oz (131.2 kg)       The Chava Mayat were provided with both written and verbal education on CHF signs/symptoms, causes, discharge medications, daily weights, low sodium diet, activity, and follow-up. Daily weights at this time in the home would be impossible without a chair or bed type scale    HF zone self management written instructions provided/reviewed and advised to call MD when in \"yellow\" zone. Instructed them to call the doctor post discharge if she experiences increasing worsening shortness of breath, worsening chest pains, increased swelling from their baseline, worsening cough, or weight gain of >2-3 lbs in a day/ 5 lb gain in a week. Also advised to call the doctor if she feels dizzy, increased fatigue, decreased or difficulty urinating. Instructed on and emphasized importance of scheduled hospital follow-up appointment with Dr. Joselyn Lang on Thursday September 6, 2018 at 3:40pm- provided she does not D/C to SNF    Their education needs reinforcement. Additional questions at this time include: information about skilled nursing home care (even for after a rehab placement) Stated they will alert her nurse with any questions. PATIENT/CAREGIVER TEACHING:    Level of patient/caregiver understanding able to:   [ X ] Verbalize understanding [ ] Demonstrate understanding [ ] Teach back   [ X ] Needs reinforcement [ ] Other:     Education Time: 35 minutes    Recommendations:   1. Encourage follow-up appointment compliance.  Next

## 2018-08-28 NOTE — PROGRESS NOTES
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 Fall Risk, Chair/bed alarm  Treatment Time: 09:54 -11:02  Treatment Number:  1     Patient Goals for Therapy: not stated     Discharge Recommendations: SNF  AM-PAC Mobility Score: 9  DME needs for discharge:  Defer to facility    Preadmission Environment    Pt. Lives with  ( reports balance issues 2/2 inner ear condition)  Home environment: two story home  Steps to enter first floor: ramp  Steps to second floor: pt does not access second floor  Bathroom: Uses bedpan with assistance from  for toilet hygiene  Equipment owned: w/c, cane, walker, scooter, tahira lift, hospital bed, Neelima Fox with w/c ramp    Preadmission Status   Pt currently uses bed pan for toileting at home. Pt reports recieving bathing assistance from aid 2-3 times per week, has assistance for laundry and light housekeeping 3x per week. Pt. Has assistance from  for transfers and propulsion of w/c. Pt is fully dependent for mobility with w/c 2/2 L UE hemiplegia.  reports primary mode of transfer pt to/from bed<>w/c with stand pivot with L LE blocked, pt able to assist with use of R LE.  reports use of tahira for transfers from floor. Pt has outside assistance from agency for bathing, laundry, housekeeping  Pt reports regularly completing exercises EOB and supine for L UE and LE with assistance from   Not receiving home PT at this time due to insurance coverage    Pain    Rating: no c/o pain at time of evaluation  Location: n/a  Pain Medicine Status: Denies need. RN notified.       Cognition    A&O to person, place and day of week (unable to provide actual date), patient tolerance, decreased safety awareness, decreased strength, decreased balance, and decreased independence with transfers. PT recommending SNF for d/c planning. Pt required assistance of 3 persons for transfer from bed >chair.  reports having balance difficulties and PT visualized  experiencing several bouts of unsteadiness during one hour evaluation.  is primary caregiver and provides 24/7 assist. Recommending SNF for pt to build up strength for increased safety during transfers before returning home. Pt. Limited during evaluation by weakness    At end of evaluation, pt. In chair in reclined position, alarm activated with call light and needs within reach. Goal(s) :   Changing Body Position [id number]  To be met in 3 visits:  1). Independent with R LE Ex x 10 reps     To be met in 6 visits:  1). Supine to/from sit Min Ax1  2). Sit to/from stand Mod Ax1  3). Bed to chair Mod Ax1  4). Tolerate B LE exercises 10 reps    Rehabilitation Potential   Good for goals listed above. Strengths for achieving goals include: Supportive   Barriers to achieving goals include: L side UE/LE hemiplegia    Plan    To be seen 5x/week while in acute care setting for therapeutic exercises, bed mobility, transfers, and family/patient education. Timed Code Treatment Minutes:  58 minutes  Total Treatment Time:   68 minutes    Cal Reyes, PT, DPT #[id number]      If patient discharges from this facility prior to next visit, this note will serve as the Discharge Summary.

## 2018-08-28 NOTE — PROGRESS NOTES
Inpatient Occupational Therapy  Evaluation and Treatment    Unit:  [facility]   Date:  8/28/2018  Patient Name:    [place]  Admitting diagnosis:  SOB (shortness of breath) [R06.02]  SOB (shortness of breath) [R06.02]  Admit Date:  8/25/2018  Precautions/Restrictions/WB Status/ Lines/ Wounds/ Oxygen: S/P CVA left sided weakness, impaired vision  Treatment Time:  954-1102  Treatment Number: 1    Patient Goals for Therapy:  \" not stated\"    Discharge Recommendations: SNF   AM-PAC Score:  10    DME needs for discharge: none at this time      Preadmission Environment:    Pt. Lives with  ( reports balance issues 2/2 inner ear condition)  Home environment: two story home. Steps to enter first floor:     Steps to second floor: pt does not access second floor  Bathroom: Uses bedpan with assistance from  for toilet hygiene  Equipment owned: w/c, cane, walker, scooter, tahira lift, hospital bed, Kennedy Moh with w/c ramp     Preadmission Status   Pt. Lives at home with  in two story home. Pt's bedroom is on the first floor. Uses bed pan for tolieting. Have bathing aid 2-3 times per week, has assistance for laundry and light housekeeping 3x per week. Pt. Had assistance from family for transfers and propulsion of w/c  Pt has outside assistance from agency for bathing, laundry, housekeeping  Pt. Fully dependent for mobility with w/c.  transfers pt, pt able to assist with use of R LE. Only when  is unable to transfer via total assist lift from floor will he use tahira for transfer  Pt reports completing exercises EOB for L UE and LE  Not receiving home PT at this time due to insurance coverage    Pain:  No pain complaints. Pts 02 at rest was at 93% with the 02 dropping to 88% with transfer to the chair and with resting the pt improved to the 90s. Cognition:    A&Ox4- pt could not provide the month and date however the pt stated it was Tuesday  Follows 1 step and 2 step commands.     Upper Upper Body Bathing- min assist for left UE and chest   4). Lower Body Bathing-Max assist   5). Upper Body Dressing- mod-max assist   6). Lower Body Dressing- max assist   7). Pt to rso UE exs [id number]    Rehabilitation Potential:  Good for goals listed above. Strengths for achieving goals include:cooperative  Barriers to achieving goals include:multiple medical issues     Plan: To be seen 5x/week while in acute care setting for therapeutic exercises, bed mobility, transfers, dressing, bathing,family/patient education with adaptive equipment, breathing technique instruction.      Timed Code Treatment Minutes: 15    Total Treatment Time:   68  minutes    Signature and License Number  Jaleesa Alamo OTR/L [id number]        If patient discharges from this facility prior to next visit, this note will serve as the Discharge Summary

## 2018-08-28 NOTE — PROGRESS NOTES
Pt has had minimal amount of output since shift began. Purewick reapplied.  Electronically signed by Ruperto Morris RN on 8/27/2018 at 11:48 PM

## 2018-08-28 NOTE — PROGRESS NOTES
Shift assessment complete. No c/o SOB; remains on 1L NC. Pt states all needs are met at this time. Call light and bed side table within reach.

## 2018-08-28 NOTE — PROGRESS NOTES
Admit: 2018    Name:  Génesis Callejas  Room:  [address] [place] [address]  MRN:    [id number]    Daily Progress Note for 2018     Interval History:     Sats dropped to 86% on RA      Low grade fever    Scheduled Meds:   furosemide  40 mg Intravenous Daily    cephALEXin  250 mg Oral Daily    dabigatran  150 mg Oral BID    digoxin  125 mcg Oral Daily    escitalopram  10 mg Oral Nightly    trospium  20 mg Oral BID    losartan  50 mg Oral Daily    metoprolol succinate  50 mg Oral Daily    niacin  500 mg Oral Nightly    olopatadine  1 drop Ophthalmic Daily    pantoprazole  40 mg Oral Daily    simvastatin  20 mg Oral Nightly    venlafaxine  37.5 mg Oral Daily    sodium chloride flush  10 mL Intravenous 2 times per day    insulin lispro  0-6 Units Subcutaneous TID WC    insulin lispro  0-3 Units Subcutaneous Nightly    therapeutic multivitamin-minerals  1 tablet Oral Daily    spironolactone  25 mg Oral Daily    And    hydrochlorothiazide  25 mg Oral Daily       Continuous Infusions:   dextrose         PRN Meds:  ammonium lactate, sodium chloride flush, magnesium hydroxide, glucose, dextrose, glucagon (rDNA), dextrose, magnesium sulfate, potassium chloride **OR** potassium chloride **OR** potassium chloride, prochlorperazine, acetaminophen                  Objective:     Temp  Av.2 °F (37.3 °C)  Min: 98 °F (36.7 °C)  Max: 100.4 °F (38 °C)  Pulse  Av.9  Min: 86  Max: 100  BP  Min: 123/82  Max: 148/79  SpO2  Av.6 %  Min: 91 %  Max: 96 %  Patient Vitals for the past 4 hrs:   BP Temp Temp src Pulse Resp SpO2   18 0825 - - - 86 - -   18 0710 133/68 98 °F (36.7 °C) Oral 89 18 92 %         Intake/Output Summary (Last 24 hours) at 18 0939  Last data filed at 18 0539   Gross per 24 hour   Intake              595 ml   Output              700 ml   Net             -105 ml       Physical Exam:  General appearance:  Elderly, morbidly obese WF lying in bed in NAD.   HEENT:  Normal cephalic, atraumatic without obvious deformity. Pupils equal, round, and reactive to light. Extra ocular muscles intact. Conjunctivae/corneas clear. Neck: Supple, with full range of motion. No jugular venous distention. Trachea midline. Respiratory:  No respiratory distress. On 2 L O2 NC. Cardiovascular:  Regular rate and rhythm with normal S1/S2 without murmurs, rubs or gallops. Abdomen: Soft, non-tender, non-distended with normal bowel sounds. Musculoskeletal:  No clubbing, cyanosis or edema bilaterally. Full range of motion without deformity. Skin: Skin color, texture, turgor normal.  No rashes or lesions. Neurologic: left extremities weakness  Psychiatric:  Alert and oriented, thought content appropriate, normal insight  Lab Data:  CBC:   Recent Labs      08/26/18   0743  08/27/18   0544  08/28/18   0558   WBC  6.7  6.7  7.7   RBC  4.31  4.23  4.34   HGB  13.4  13.3  13.6   HCT  40.2  39.1  39.7   MCV  93.2  92.5  91.4   RDW  13.9  13.5  13.6   PLT  196  193  226     BMP:   Recent Labs      08/26/18   0743  08/27/18   0544  08/28/18   0558   NA  136  139  133*   K  3.9  3.4*  3.6   CL  99  100  93*   CO2  26  28  28   PHOS  4.2  4.3  3.8   BUN  11  13  15   CREATININE  <0.5*  <0.5*  0.6     BNP: No results for input(s): BNP in the last 72 hours. PT/INR:   Recent Labs      08/26/18   0743   PROTIME  12.5   INR  1.10     APTT:  Recent Labs      08/26/18   0743   APTT  25.4*     CARDIAC ENZYMES:   Recent Labs      08/25/18   2316  08/26/18   0743  08/26/18   1155   TROPONINI  <0.01  <0.01  <0.01     FASTING LIPID PANEL:  Lab Results   Component Value Date    CHOL 129 08/26/2018    HDL 36 08/26/2018    TRIG 144 08/26/2018     LIVER PROFILE:   Recent Labs      08/25/18   2316   AST  26   ALT  23   BILITOT  0.8   ALKPHOS  105         XR CHEST PORTABLE   Final Result   Moderate interstitial edema.    ECHO  Left ventricular systolic function appears hyperdynamic with ejection   fraction estimated at 60-65 %   There is mild concentric left ventricular hypertrophy.   Grade III diastolic dysfunction with elevated filling pressure.   Biatrial enlargement.   The aortic valve is thickened/calcified with decreased leaflet mobility   consistent with aortic stenosis.   Mild aortic stenosis.   Mitral annular calcification is present.   Mild to moderate mitral regurgitation.   Moderate tricuspid regurgitation.   Systolic pulmonary artery pressure (SPAP) estimated at 40 mmHg (right atrial   pressure 3 mmHg), consistent with mild pulmonary hypertension. Assessment & Plan:     Patient Active Problem List    Diagnosis Date Noted    Acute respiratory failure with hypoxia ([facility]     Acute congestive heart failure ([facility]     Abdominal pain 08/26/2018    SOB (shortness of breath) 08/26/2018    Pulmonary interstitial edema on CXR 08/26/2018    Elevated brain natriuretic peptide (BNP) level 08/26/2018    QTc prolongation 08/26/2018    Morbid obesity with BMI of 50.0-59.9, adult ([facility] 08/26/2018    Anticoagulated on Pradaxa 08/26/2018    CAD (coronary artery disease)     Hemiplegia affecting nondominant side s/p CVA     Hyperlipidemia     DM2 (diabetes mellitus, type 2) ([facility] 10/14/2016    Paroxysmal atrial fibrillation ([facility] 10/27/2015    Essential hypertension 10/27/2015    Depression 07/24/2015   Acute hypoxic resp failure  Acute CHF,systolic versus diastolic   - given CXR finding of interstitial edema and elevated BNP (in the setting of normal renal function), I will assume that CHF is the etiology. - daily weights.  - I/Os. - Lasix 40 mg IV x1.  - continue home HCTZ and Aldactone. - monitor pulse ox.  - supplemental O2 as needed. ECHO - diastolic CHF,mild AS,mod MR  Repeat lasix dose today        QTc-Prolongation. - avoid QT-prolonging meds.      PAF. - monitor on tele. - continue home digoxin, Toprol, and Pradaxa.      DM2.  - diabetic diet. - Accuchecks and SSI.      Hemiplegia s/p CVA.   - pt is care for by her  at home.      Morbid Obesity.  - encourage weight loss. - dietary consult. Low grade fever.   Check u/a,culture  On daily  keflex for UTI prophylaxis        DVT Prophylaxis: Pradaxa  Diet: DIET CARB CONTROL; Carb Control: 5 carb choices (75 gms)/meal; Cardiac  Code Status: Full Code     Inpatient adnission    PT/OT     D/c gerardo Arnett

## 2018-08-28 NOTE — PROGRESS NOTES
Bedside report given and pt care transferred to [facility]. Pt denies needs at this time. Call light within reach.

## 2018-08-28 NOTE — FLOWSHEET NOTE
08/28/18 1915   Vital Signs   Temp 98.6 °F (37 °C)   Temp Source Oral   Pulse 87   Heart Rate Source Monitor   Resp 18   BP (!) 164/76   BP Location Right lower arm   BP Upper/Lower Lower   Level of Consciousness 0   MEWS Score 1   Oxygen Therapy   SpO2 93 %   O2 Device None (Room air)   Pt resting in bed, awake/alert. No c/o voiced.  Miquel Cardona

## 2018-08-28 NOTE — PROGRESS NOTES
AM assessment complete. Gave am meds due see mar. A/O x 3. Denies any pain. Water given. Phoned  for pt. Call light within reach.

## 2018-08-29 VITALS
OXYGEN SATURATION: 93 % | HEIGHT: 60 IN | BODY MASS INDEX: 54.05 KG/M2 | HEART RATE: 93 BPM | TEMPERATURE: 97.7 F | SYSTOLIC BLOOD PRESSURE: 157 MMHG | WEIGHT: 275.3 LBS | RESPIRATION RATE: 18 BRPM | DIASTOLIC BLOOD PRESSURE: 70 MMHG

## 2018-08-29 LAB
ANION GAP SERPL CALCULATED.3IONS-SCNC: 14 MMOL/L (ref 3–16)
BUN BLDV-MCNC: 14 MG/DL (ref 7–20)
CALCIUM SERPL-MCNC: 9 MG/DL (ref 8.3–10.6)
CHLORIDE BLD-SCNC: 94 MMOL/L (ref 99–110)
CO2: 29 MMOL/L (ref 21–32)
CREAT SERPL-MCNC: 0.6 MG/DL (ref 0.6–1.2)
GFR AFRICAN AMERICAN: >60
GFR NON-AFRICAN AMERICAN: >60
GLUCOSE BLD-MCNC: 131 MG/DL (ref 70–99)
GLUCOSE BLD-MCNC: 142 MG/DL (ref 70–99)
GLUCOSE BLD-MCNC: 169 MG/DL (ref 70–99)
PERFORMED ON: ABNORMAL
PERFORMED ON: ABNORMAL
POTASSIUM SERPL-SCNC: 3.3 MMOL/L (ref 3.5–5.1)
SODIUM BLD-SCNC: 137 MMOL/L (ref 136–145)

## 2018-08-29 PROCEDURE — 80048 BASIC METABOLIC PNL TOTAL CA: CPT

## 2018-08-29 PROCEDURE — 2580000003 HC RX 258: Performed by: HOSPITALIST

## 2018-08-29 PROCEDURE — 6370000000 HC RX 637 (ALT 250 FOR IP): Performed by: HOSPITALIST

## 2018-08-29 PROCEDURE — 6360000002 HC RX W HCPCS: Performed by: INTERNAL MEDICINE

## 2018-08-29 PROCEDURE — 36415 COLL VENOUS BLD VENIPUNCTURE: CPT

## 2018-08-29 PROCEDURE — 99239 HOSP IP/OBS DSCHRG MGMT >30: CPT | Performed by: INTERNAL MEDICINE

## 2018-08-29 PROCEDURE — 6370000000 HC RX 637 (ALT 250 FOR IP): Performed by: INTERNAL MEDICINE

## 2018-08-29 RX ORDER — FUROSEMIDE 40 MG/1
40 TABLET ORAL DAILY
Status: DISCONTINUED | OUTPATIENT
Start: 2018-08-30 | End: 2018-08-29 | Stop reason: HOSPADM

## 2018-08-29 RX ORDER — METOPROLOL SUCCINATE 50 MG/1
TABLET, EXTENDED RELEASE ORAL
Qty: 90 TABLET | Refills: 0 | Status: SHIPPED | OUTPATIENT
Start: 2018-08-29 | End: 2018-11-16 | Stop reason: SDUPTHER

## 2018-08-29 RX ORDER — POTASSIUM CHLORIDE 750 MG/1
40 TABLET, EXTENDED RELEASE ORAL ONCE
Status: COMPLETED | OUTPATIENT
Start: 2018-08-29 | End: 2018-08-29

## 2018-08-29 RX ADMIN — CEPHALEXIN 250 MG: 250 CAPSULE ORAL at 09:43

## 2018-08-29 RX ADMIN — VENLAFAXINE HYDROCHLORIDE 37.5 MG: 37.5 CAPSULE, EXTENDED RELEASE ORAL at 09:44

## 2018-08-29 RX ADMIN — Medication 10 ML: at 09:43

## 2018-08-29 RX ADMIN — DIGOXIN 125 MCG: 125 TABLET ORAL at 09:43

## 2018-08-29 RX ADMIN — HYDROCHLOROTHIAZIDE 25 MG: 25 TABLET ORAL at 09:43

## 2018-08-29 RX ADMIN — SPIRONOLACTONE 25 MG: 25 TABLET ORAL at 09:43

## 2018-08-29 RX ADMIN — POTASSIUM CHLORIDE 40 MEQ: 10 TABLET, EXTENDED RELEASE ORAL at 15:10

## 2018-08-29 RX ADMIN — METOPROLOL SUCCINATE 50 MG: 50 TABLET, EXTENDED RELEASE ORAL at 09:43

## 2018-08-29 RX ADMIN — TROSPIUM CHLORIDE 20 MG: 20 TABLET ORAL at 09:42

## 2018-08-29 RX ADMIN — PANTOPRAZOLE SODIUM 40 MG: 40 TABLET, DELAYED RELEASE ORAL at 06:03

## 2018-08-29 RX ADMIN — FUROSEMIDE 40 MG: 10 INJECTION, SOLUTION INTRAMUSCULAR; INTRAVENOUS at 09:43

## 2018-08-29 RX ADMIN — DABIGATRAN ETEXILATE MESYLATE 150 MG: 150 CAPSULE ORAL at 09:42

## 2018-08-29 RX ADMIN — MULTIPLE VITAMINS W/ MINERALS TAB 1 TABLET: TAB at 09:43

## 2018-08-29 RX ADMIN — LOSARTAN POTASSIUM 50 MG: 25 TABLET, FILM COATED ORAL at 09:44

## 2018-08-29 ASSESSMENT — PAIN SCALES - GENERAL: PAINLEVEL_OUTOF10: 0

## 2018-08-29 NOTE — PROGRESS NOTES
Admit: 2018    Name:  Marium Green  Room:  [facility]/2750-38  MRN:    [id number]    Daily Progress Note for 2018     Interval History:     Sats dropped to 86% on RA      Low grade fever    Scheduled Meds:   furosemide  40 mg Intravenous Daily    cephALEXin  250 mg Oral Daily    dabigatran  150 mg Oral BID    digoxin  125 mcg Oral Daily    escitalopram  10 mg Oral Nightly    trospium  20 mg Oral BID    losartan  50 mg Oral Daily    metoprolol succinate  50 mg Oral Daily    niacin  500 mg Oral Nightly    olopatadine  1 drop Ophthalmic Daily    pantoprazole  40 mg Oral Daily    simvastatin  20 mg Oral Nightly    venlafaxine  37.5 mg Oral Daily    sodium chloride flush  10 mL Intravenous 2 times per day    insulin lispro  0-6 Units Subcutaneous TID WC    insulin lispro  0-3 Units Subcutaneous Nightly    therapeutic multivitamin-minerals  1 tablet Oral Daily    spironolactone  25 mg Oral Daily    And    hydrochlorothiazide  25 mg Oral Daily       Continuous Infusions:   dextrose         PRN Meds:  ammonium lactate, sodium chloride flush, magnesium hydroxide, glucose, dextrose, glucagon (rDNA), dextrose, magnesium sulfate, potassium chloride **OR** potassium chloride **OR** potassium chloride, prochlorperazine, acetaminophen                  Objective:     Temp  Av.7 °F (37.1 °C)  Min: 98.4 °F (36.9 °C)  Max: 99.3 °F (37.4 °C)  Pulse  Av.5  Min: 87  Max: 107  BP  Min: 128/62  Max: 182/81  SpO2  Av %  Min: 91 %  Max: 93 %  Patient Vitals for the past 4 hrs:   BP Temp Temp src Pulse Resp SpO2   18 0939 131/71 99.3 °F (37.4 °C) Oral 91 18 91 %         Intake/Output Summary (Last 24 hours) at 18 1001  Last data filed at 18 0354   Gross per 24 hour   Intake              840 ml   Output              400 ml   Net              440 ml       Physical Exam:  General appearance:  Elderly, morbidly obese WF lying in bed in NAD.   HEENT:  Normal cephalic, atraumatic pressure.   Biatrial enlargement.   The aortic valve is thickened/calcified with decreased leaflet mobility   consistent with aortic stenosis.   Mild aortic stenosis.   Mitral annular calcification is present.   Mild to moderate mitral regurgitation.   Moderate tricuspid regurgitation.   Systolic pulmonary artery pressure (SPAP) estimated at 40 mmHg (right atrial   pressure 3 mmHg), consistent with mild pulmonary hypertension. Assessment & Plan:     Patient Active Problem List    Diagnosis Date Noted    [facility]     Acute congestive heart failure ([facility]     Abdominal pain 08/26/2018    SOB (shortness of breath) 08/26/2018    Pulmonary interstitial edema on CXR 08/26/2018    Elevated brain natriuretic peptide (BNP) level 08/26/2018    QTc prolongation 08/26/2018    Morbid obesity with BMI of 50.0-59.9, adult ([facility] 08/26/2018    Anticoagulated on Pradaxa 08/26/2018    CAD (coronary artery disease)     Hemiplegia affecting nondominant side s/p CVA     Hyperlipidemia     DM2 (diabetes mellitus, type 2) ([facility] 10/14/2016    Paroxysmal atrial fibrillation ([facility] 10/27/2015    Essential hypertension 10/27/2015    Depression 07/24/2015   Acute hypoxic resp failure  Acute CHF,systolic versus diastolic   - given CXR finding of interstitial edema and elevated BNP (in the setting of normal renal function), I will assume that CHF is the etiology. - daily weights.  - I/Os. - Lasix 40 mg IV x1.  - continue home HCTZ and Aldactone. - monitor pulse ox.  - supplemental O2 as needed. ECHO - diastolic CHF,mild AS,mod MR  Repeat lasix dose today        QTc-Prolongation. - avoid QT-prolonging meds.      PAF. - monitor on tele. - continue home digoxin, Toprol, and Pradaxa.      DM2.  - diabetic diet. - Accuchecks and SSI.      Hemiplegia s/p CVA. - pt is care for by her  at home.      Morbid Obesity.  - encourage weight loss. - dietary consult.     Low grade fever.  Check u/a,culture  On daily  keflex for UTI prophylaxis        DVT Prophylaxis: Pradaxa  Diet: DIET CARB CONTROL; Carb Control: 5 carb choices (75 gms)/meal; Cardiac  Code Status: Full Code     Inpatient adnission    PT/OT     D/c to SNF  Awaiting haritha Padron

## 2018-08-29 NOTE — DISCHARGE SUMMARY
Name:  Cl Clarke  Room:  1735/0620-85  MRN:    [id number]    Discharge Summary      This discharge summary is in conjunction with a complete physical exam done on the day of discharge. Discharging Physician: Dr. Meredith Mcnamara: 8/25/2018  Discharge:   8/29/2018     HPI taken from admission H&P:     80 y.o. female with a hx of CAD, PAF, anticoagulated on Pradaxa, HTN, DM2, HLD, hemiplegia due to CVA, morbid obesity (BMI 56), and depression, who presents to the [facility] ED from home, where she lives with her  (and where he takes care of her) via EMS due to SOB and R-sided abd pain. Her symptoms resolved when she reached the ED. She was satting 95% on RA. Her vital signs were stable save for some mild HTN. CXR was read as interstitial pulmonary edema. BNP was elevated. EKG showed a prolonged QTc. When I saw her on the floor, she was sleeping and was in NAD. She was on 2 L O2 NC. She told me her symptoms started yesterday when her  was cleaning/bathing her. Diagnoses this Admission and Hospital Course     Acute hypoxic resp failure  Acute diastolic CHF   - echo with normal EF and g2DD, mild AS,mod MR  - IV lasix during admit and dc on PO lasix    - on RA at discharge     QTc-Prolongation. - avoid QT-prolonging meds.      PAF. - monitor on tele. - continue home digoxin, Toprol, and Pradaxa.      DM2.  - diabetic diet. - Accuchecks and SSI.      Hemiplegia s/p CVA. - pt is cared for by her  at home.      Morbid Obesity.  - encourage weight loss. - dietary consult.     Low grade fever.   Checked u/a- negative   On daily  keflex for UTI prophylaxis  - fever resolved at discharge    Procedures (Please Review Full Report for Details)  None     Consults    None     Physical Exam at Discharge:    BP (!) 157/70   Pulse 93   Temp 97.7 °F (36.5 °C) (Oral)   Resp 18   Ht 5' (1.524 m)   Wt 275 lb 4.8 oz (124.9 kg)   SpO2 93%   BMI 53.77 kg/m²     General appearance:  Elderly, morbidly obese WF lying in bed in NAD. HEENT:  Normal cephalic, atraumatic without obvious deformity. Pupils equal, round, and reactive to light.  Extra ocular muscles intact. Conjunctivae/corneas clear. Neck: Supple, with full range of motion. No jugular venous distention. Trachea midline. Respiratory:  No respiratory distress. On 2 L O2 NC. Cardiovascular:  Regular rate and rhythm with normal S1/S2 without murmurs, rubs or gallops. Abdomen: Soft, non-tender, non-distended with normal bowel sounds. Musculoskeletal:  No clubbing, cyanosis or edema bilaterally.  Full range of motion without deformity. Skin: Skin color, texture, turgor normal.  No rashes or lesions. Neurologic: left extremities weakness  Psychiatric:  Alert and oriented, thought content appropriate, normal insight      CBC:   Recent Labs      08/27/18   0544  08/28/18   0558   WBC  6.7  7.7   HGB  13.3  13.6   HCT  39.1  39.7   MCV  92.5  91.4   PLT  193  226     BMP:   Recent Labs      08/27/18   0544  08/28/18   0558  08/29/18   0556   NA  139  133*  137   K  3.4*  3.6  3.3*   CL  100  93*  94*   CO2  28  28  29   PHOS  4.3  3.8   --    BUN  13  15  14   CREATININE  <0.5*  0.6  0.6     UA:  Recent Labs      08/28/18   1741   COLORU  Yellow   PHUR  5.5   WBCUA  3-5   RBCUA  0-2   BACTERIA  3+*   CLARITYU  Clear   SPECGRAV  1.010   LEUKOCYTESUR  TRACE*   UROBILINOGEN  0.2   BILIRUBINUR  Negative   BLOODU  Negative   GLUCOSEU  Negative       RADIOLOGY  XR CHEST PORTABLE   Final Result   Moderate interstitial edema.            Echo   Left ventricular systolic function appears hyperdynamic with ejection   fraction estimated at 60-65 %   There is mild concentric left ventricular hypertrophy.   Grade III diastolic dysfunction with elevated filling pressure.   Biatrial enlargement.   The aortic valve is thickened/calcified with decreased leaflet mobility   consistent with aortic stenosis.   Mild aortic stenosis.   Mitral annular calcification is daily.  DX:E11.9     ONE TOUCH LANCETS Misc  Patient tests once daily. DX:E11.9     PATADAY 0.2 % Soln ophthalmic solution  Generic drug:  olopatadine     simvastatin 20 MG tablet  Commonly known as:  ZOCOR  Take 1 tablet by mouth nightly     spironolactone-hydrochlorothiazide 25-25 MG per tablet  Commonly known as:  ALDACTAZIDE  TAKE 1 TABLET DAILY           Where to Get Your Medications      These medications were sent to [facility] [address]  [place] [address]    Phone:  [phone]   · metFORMIN 500 MG tablet  · metoprolol succinate 50 MG extended release tablet     These medications were sent to [facility], [place] [address] [facility] Subhash Villagomez [phone] [address] [address] [place] [address] [address], [address]    Phone:  [phone]   · furosemide 40 MG tablet     Information about where to get these medications is not yet available    Ask your nurse or doctor about these medications  · escitalopram 20 MG tablet  · venlafaxine 37.5 MG extended release capsule           Discharged in stable condition to Home (refused SNF)    Follow Up: Follow up with PCP in 1 week    Total time spent on discharge is 35 minutes    CORINNA Hull.

## 2018-08-29 NOTE — FLOWSHEET NOTE
08/29/18 0939   Vital Signs   Temp 99.3 °F (37.4 °C)   Temp Source Oral   Pulse 91   Heart Rate Source Monitor   Resp 18   /71   BP Location Right lower arm   BP Upper/Lower Lower   Level of Consciousness 0   MEWS Score 1   Pain Assessment   Pain Assessment 0-10   Pain Level 0   Oxygen Therapy   SpO2 91 %   O2 Device None (Room air)   AM assessment completed, see flow sheet. Pt is alert and oriented. Vital signs are WNL. Respirations are even & easy. No complaints voiced. Pt denies needs at this time. SR up x 2, and bed in low position. Call light is within reach. Will continue to monitor.

## 2018-08-29 NOTE — CARE COORDINATION
[facility]  Received referral regarding HC services from [address] [place] [address]. Liaison to follow up with pt prior to discharge. Attempt to follow up with pt and nurse at bedside.     Faxed referral with orders to [facility] for [facility]- , PT/OT, MSW and HCV Program.    Electronically signed by Bradly Arnold RN on 8/29/2018 at 1:33 PM

## 2018-08-29 NOTE — CARE COORDINATION
Case Management Assessment  Initial Evaluation    Date/Time of Evaluation: 8/29/2018 1:44 PM  Assessment Completed by: Judit Gould    Patient Name: Marcell Ramires  YOB: 1933  Diagnosis: SOB (shortness of breath) [R06.02]  SOB (shortness of breath) [R06.02]  Date / Time: 8/25/2018 10:30 PM  Admission status/Date: 8/25/18 Inpt 22:30  Chart Reviewed: Yes      Patient Interviewed: Yes   Family Interviewed:  Yes - spouse      Hospitalization in the last 30 days:  No    Contacts  : Julita Santana   Relationship to Patient:  Phone Number: [phone]  Alternate Contact: Michell Soto   Relationship to Patient: daughter   Phone Number: [phone]    Current PCP [facility]: Spouse/Significant Other, Children, Family Members  Transportation: family    Meal Preparation: spouse    Housing  Home Environment:  Lives 2 story home but lives on 1 st floor Steps: ramp  Plans to Return to Present Housing: Yes  Other Identified Issues:     Alex Quiroga 78  Currently active with [address] : No  Type of Home Care Services: Aide Services, Nursing Services  Passport/Waiver : No  Passport/Waiver Services:     [facility] Provider: N/A  Equipment:  [employer] lift, W/C, scooter, hospital bed.  chair, BSC    Has Home O2 in place on admit:  No  Informed of need to bring portable home O2 tank on day of discharge for nursing to connect prior to leaving:   Not Indicated  Verbalized agreement/Understanding:   Not Indicated    Community Service Affiliation  Dialysis:  No  Outpatient PT/OT: No    Cancer Center: No     CHF Clinic: No     Pulmonary Rehab: No  Pain Clinic: No  Community Mental Health: No    Wound Clinic: No     Other:     DISCHARGE PLAN: Reviewed chart and met with pt and spouse. Role of dcp explained. [facility] lives home with spouse in 2 story home but they actually stay on 1 fl only. [facility] has ramp and

## 2018-08-31 ENCOUNTER — TELEPHONE (OUTPATIENT)
Dept: TELEMETRY | Age: 83
End: 2018-08-31

## 2018-08-31 LAB
ORGANISM: ABNORMAL
ORGANISM: ABNORMAL
URINE CULTURE, ROUTINE: ABNORMAL

## 2018-09-04 ENCOUNTER — TELEPHONE (OUTPATIENT)
Dept: INTERNAL MEDICINE CLINIC | Age: 83
End: 2018-09-04

## 2018-09-04 ENCOUNTER — HOSPITAL ENCOUNTER (OUTPATIENT)
Age: 83
Setting detail: SPECIMEN
Discharge: HOME | End: 2018-09-04
Payer: MEDICARE

## 2018-09-04 LAB
ANION GAP SERPL CALCULATED.3IONS-SCNC: 24 MMOL/L (ref 3–16)
BUN BLDV-MCNC: 19 MG/DL (ref 7–20)
CALCIUM SERPL-MCNC: 9.5 MG/DL (ref 8.3–10.6)
CHLORIDE BLD-SCNC: 94 MMOL/L (ref 99–110)
CO2: 20 MMOL/L (ref 21–32)
CREAT SERPL-MCNC: 0.8 MG/DL (ref 0.6–1.2)
GFR AFRICAN AMERICAN: >60
GFR NON-AFRICAN AMERICAN: >60
GLUCOSE BLD-MCNC: 197 MG/DL (ref 70–99)
POTASSIUM SERPL-SCNC: 3.6 MMOL/L (ref 3.5–5.1)
SODIUM BLD-SCNC: 138 MMOL/L (ref 136–145)

## 2018-09-04 PROCEDURE — 36415 COLL VENOUS BLD VENIPUNCTURE: CPT

## 2018-09-04 PROCEDURE — 80048 BASIC METABOLIC PNL TOTAL CA: CPT

## 2018-09-06 ENCOUNTER — OFFICE VISIT (OUTPATIENT)
Dept: INTERNAL MEDICINE CLINIC | Age: 83
End: 2018-09-06

## 2018-09-06 VITALS
HEART RATE: 75 BPM | SYSTOLIC BLOOD PRESSURE: 125 MMHG | DIASTOLIC BLOOD PRESSURE: 75 MMHG | HEIGHT: 60 IN | BODY MASS INDEX: 53.77 KG/M2 | RESPIRATION RATE: 18 BRPM

## 2018-09-06 DIAGNOSIS — I48.0 PAROXYSMAL ATRIAL FIBRILLATION (HCC): Chronic | ICD-10-CM

## 2018-09-06 DIAGNOSIS — G81.90 HEMIPLEGIA AFFECTING NONDOMINANT SIDE (HCC): Chronic | ICD-10-CM

## 2018-09-06 DIAGNOSIS — Z23 NEED FOR INFLUENZA VACCINATION: ICD-10-CM

## 2018-09-06 DIAGNOSIS — Z09 HOSPITAL DISCHARGE FOLLOW-UP: Primary | ICD-10-CM

## 2018-09-06 DIAGNOSIS — I50.32 CHF (CONGESTIVE HEART FAILURE), NYHA CLASS I, CHRONIC, DIASTOLIC (HCC): ICD-10-CM

## 2018-09-06 DIAGNOSIS — E11.9 TYPE 2 DIABETES MELLITUS WITHOUT COMPLICATION, WITHOUT LONG-TERM CURRENT USE OF INSULIN (HCC): Chronic | ICD-10-CM

## 2018-09-06 PROCEDURE — 1111F DSCHRG MED/CURRENT MED MERGE: CPT | Performed by: INTERNAL MEDICINE

## 2018-09-06 PROCEDURE — G8400 PT W/DXA NO RESULTS DOC: HCPCS | Performed by: INTERNAL MEDICINE

## 2018-09-06 PROCEDURE — 90662 IIV NO PRSV INCREASED AG IM: CPT | Performed by: INTERNAL MEDICINE

## 2018-09-06 PROCEDURE — 1090F PRES/ABSN URINE INCON ASSESS: CPT | Performed by: INTERNAL MEDICINE

## 2018-09-06 PROCEDURE — G8598 ASA/ANTIPLAT THER USED: HCPCS | Performed by: INTERNAL MEDICINE

## 2018-09-06 PROCEDURE — 1101F PT FALLS ASSESS-DOCD LE1/YR: CPT | Performed by: INTERNAL MEDICINE

## 2018-09-06 PROCEDURE — G8417 CALC BMI ABV UP PARAM F/U: HCPCS | Performed by: INTERNAL MEDICINE

## 2018-09-06 PROCEDURE — 99213 OFFICE O/P EST LOW 20 MIN: CPT | Performed by: INTERNAL MEDICINE

## 2018-09-06 PROCEDURE — G0008 ADMIN INFLUENZA VIRUS VAC: HCPCS | Performed by: INTERNAL MEDICINE

## 2018-09-06 PROCEDURE — 1123F ACP DISCUSS/DSCN MKR DOCD: CPT | Performed by: INTERNAL MEDICINE

## 2018-09-06 PROCEDURE — G8427 DOCREV CUR MEDS BY ELIG CLIN: HCPCS | Performed by: INTERNAL MEDICINE

## 2018-09-06 PROCEDURE — 4040F PNEUMOC VAC/ADMIN/RCVD: CPT | Performed by: INTERNAL MEDICINE

## 2018-09-06 PROCEDURE — 1036F TOBACCO NON-USER: CPT | Performed by: INTERNAL MEDICINE

## 2018-09-06 RX ORDER — QUETIAPINE FUMARATE 25 MG/1
25 TABLET, FILM COATED ORAL NIGHTLY
Qty: 30 TABLET | Refills: 0 | Status: SHIPPED | OUTPATIENT
Start: 2018-09-06 | End: 2018-09-14 | Stop reason: SDUPTHER

## 2018-09-06 RX ORDER — OMEPRAZOLE 40 MG/1
40 CAPSULE, DELAYED RELEASE ORAL DAILY
Qty: 90 CAPSULE | Refills: 0 | Status: SHIPPED | OUTPATIENT
Start: 2018-09-06 | End: 2018-09-14 | Stop reason: SDUPTHER

## 2018-09-06 NOTE — PROGRESS NOTES
omeprazole (PRILOSEC) 40 MG delayed release capsule TAKE 1 CAPSULE DAILY 90 capsule 0    losartan (COZAAR) 50 MG tablet Take 50 mg by mouth daily      Multiple Vitamins-Minerals-FA (OCUVEL PO) Take 1 tablet by mouth daily      olopatadine (PATADAY) 0.2 % SOLN ophthalmic solution Apply 1 drop to eye daily Both eyes      cephALEXin (KEFLEX) 250 MG capsule Take 250 mg by mouth daily      niacin 500 MG extended release capsule Take 500 mg by mouth nightly      digoxin (LANOXIN) 125 MCG tablet Take 125 mcg by mouth daily      dabigatran (PRADAXA) 150 MG capsule Take 150 mg by mouth      Fesoterodine Fumarate ER (TOVIAZ) 8 MG TB24 Take 1 tablet by mouth daily 90 tablet 0    ammonium lactate (LAC-HYDRIN) 12 % lotion Apply topically daily. (Patient taking differently: Apply topically as needed Apply topically daily.) 120 g 2    Blood Glucose Monitoring Suppl ([address]) W/DEVICE KIT Patient tests once daily. DX:E11.9 1 kit 0    ONE TOUCH LANCETS MISC Patient tests once daily. DX:E11.9 150 each 3    glucose blood VI test strips (ONE TOUCH TEST STRIPS) strip Patient tests once daily. DX: E11.9 150 each 3     No current facility-administered medications on file prior to visit. .There are no changes to past medical history, family history, social history or review of systems(except as noted in the history section) since prior note (all reviewed with patient). Objective:   Physical Exam   Vitals:    09/06/18 1541   BP: 125/75   Pulse: 75   Resp: 18       General appearance: elderly female alert, appears stated age and cooperative   Head: Normocephalic, without obvious abnormality, atraumatic   Eyes: conjunctivae/corneas clear. PERRL, EOM's intact. Neck: no adenopathy, no carotid bruit, no JVD, supple, symmetrical, trachea midline and thyroid not enlarged, symmetric, some cervical  Tenderness.   Lungs- clear dusty  Heart: irregular rate and rhythm, S1, S2 normal, no murmur, click, rub or gallop Abdomen: soft, non-tender; bowel sounds normal; no masses, no organomegaly   Extremities: extremities normal, atraumatic, dependant edema  Pulses: 2+ and symmetric   Edema - dependant edema  Neurologic: Grossly normal, left sided cva with chronic weakness . Wheel chair bound      ECHO   RADIOLOGY  XR CHEST PORTABLE   Final Result   Moderate interstitial edema.              Echo   Left ventricular systolic function appears hyperdynamic with ejection   fraction estimated at 60-65 %   There is mild concentric left ventricular hypertrophy.   Grade III diastolic dysfunction with elevated filling pressure.   Biatrial enlargement.   The aortic valve is thickened/calcified with decreased leaflet mobility   consistent with aortic stenosis.   Mild aortic stenosis.   Mitral annular calcification is present.   Mild to moderate mitral regurgitation.   Moderate tricuspid regurgitation.   Systolic pulmonary artery pressure (SPAP) estimated at 40 mmHg (right atrial   pressure 3 mmHg), consistent with mild pulmonary hypertension      Assessment:        Diagnosis Orders   1. Hospital discharge follow-up     2. Hemiplegia affecting nondominant side s/p CVA     3. Paroxysmal atrial fibrillation (HCC)     4. Type 2 diabetes mellitus without complication, without long-term current use of insulin ([address]     5. CHF (congestive heart failure), NYHA class I, chronic, diastolic (HCC)     6. Need for influenza vaccination             Plan:       Diastolic CHF - was in hospital at [facility] 2 weeks ago - now on lasix 40 mg daily, tolerating well. Renal profile stable . Low salt diet advised     Afibb - rate controlled - on digoxin, b blocker. Was on coumadin , now on pradaxa by Dr. Rosalee Alvarado ( 3/17)  stable digoxin levels    HTN - stable on metorprolol, aldactazide   Lasix     Dm- 2 -well controlled. stable on metformin , last  A1c at 5.9 . Home sugars stable     Cva-with left sided hemiparesis  thought to be embolic -from Afibb.    on pradaxa ,

## 2018-09-10 ENCOUNTER — TELEPHONE (OUTPATIENT)
Dept: INTERNAL MEDICINE CLINIC | Age: 83
End: 2018-09-10

## 2018-09-10 NOTE — TELEPHONE ENCOUNTER
----- Message from Freda David MD sent at 9/9/2018 12:04 PM EDT -----  Contact: [facility]  Fax to any oxygen company  ----- Message -----  From: Roger Castaneda  Sent: 9/7/2018  12:36 PM  To: Freda David MD    Pt's homecare called and states they do not take care of overnight pulse ox. Please advise.

## 2018-09-12 ENCOUNTER — TELEPHONE (OUTPATIENT)
Dept: INTERNAL MEDICINE CLINIC | Age: 83
End: 2018-09-12

## 2018-09-12 NOTE — TELEPHONE ENCOUNTER
----- Message from Anat Walker MD sent at 9/12/2018 12:17 PM EDT -----  Contact: [facility] [phone]  Ok to take    ----- Message -----  From: Reymundo Serrano  Sent: 9/12/2018  11:04 AM  To: Anat Walker MD    Pt's home nurse wants to know if interaction between escitalopram and quetiapine. Is this ok?

## 2018-09-13 ENCOUNTER — TELEPHONE (OUTPATIENT)
Dept: INTERNAL MEDICINE CLINIC | Age: 83
End: 2018-09-13

## 2018-09-14 RX ORDER — OMEPRAZOLE 40 MG/1
40 CAPSULE, DELAYED RELEASE ORAL DAILY
Qty: 90 CAPSULE | Refills: 0 | Status: SHIPPED | OUTPATIENT
Start: 2018-09-14 | End: 2019-01-08 | Stop reason: SDUPTHER

## 2018-09-14 RX ORDER — FUROSEMIDE 40 MG/1
40 TABLET ORAL DAILY
Qty: 90 TABLET | Refills: 0 | Status: SHIPPED | OUTPATIENT
Start: 2018-09-14 | End: 2019-01-08 | Stop reason: SDUPTHER

## 2018-09-14 RX ORDER — QUETIAPINE FUMARATE 25 MG/1
25 TABLET, FILM COATED ORAL NIGHTLY
Qty: 90 TABLET | Refills: 0 | Status: SHIPPED | OUTPATIENT
Start: 2018-09-14 | End: 2019-01-08 | Stop reason: SDUPTHER

## 2018-09-14 NOTE — TELEPHONE ENCOUNTER
----- Message from Piedad Benz sent at 9/14/2018 11:49 AM EDT -----  Contact: pt dtr- [phone]  She needs a refill on her omeprazole called into [facility]-last appt- 9-6-18-lr

## 2018-09-19 ENCOUNTER — TELEPHONE (OUTPATIENT)
Dept: INTERNAL MEDICINE CLINIC | Age: 83
End: 2018-09-19

## 2018-09-19 ENCOUNTER — TELEPHONE (OUTPATIENT)
Dept: OTHER | Facility: CLINIC | Age: 83
End: 2018-09-19

## 2018-09-19 ENCOUNTER — HOSPITAL ENCOUNTER (EMERGENCY)
Age: 83
Discharge: HOME | End: 2018-09-19
Attending: EMERGENCY MEDICINE
Payer: MEDICARE

## 2018-09-19 VITALS
WEIGHT: 287 LBS | DIASTOLIC BLOOD PRESSURE: 47 MMHG | SYSTOLIC BLOOD PRESSURE: 119 MMHG | RESPIRATION RATE: 16 BRPM | TEMPERATURE: 98 F | OXYGEN SATURATION: 94 % | HEART RATE: 86 BPM | BODY MASS INDEX: 56.05 KG/M2

## 2018-09-19 DIAGNOSIS — R31.9 HEMATURIA, UNSPECIFIED TYPE: Primary | ICD-10-CM

## 2018-09-19 LAB
ANION GAP SERPL CALCULATED.3IONS-SCNC: 13 MMOL/L (ref 3–16)
BACTERIA: ABNORMAL /HPF
BASOPHILS ABSOLUTE: 0.1 K/UL (ref 0–0.2)
BASOPHILS RELATIVE PERCENT: 1 %
BILIRUBIN URINE: NEGATIVE
BLOOD, URINE: ABNORMAL
BUN BLDV-MCNC: 16 MG/DL (ref 7–20)
CALCIUM SERPL-MCNC: 8.9 MG/DL (ref 8.3–10.6)
CHLORIDE BLD-SCNC: 97 MMOL/L (ref 99–110)
CLARITY: CLEAR
CO2: 26 MMOL/L (ref 21–32)
COLOR: YELLOW
CREAT SERPL-MCNC: <0.5 MG/DL (ref 0.6–1.2)
EOSINOPHILS ABSOLUTE: 0.4 K/UL (ref 0–0.6)
EOSINOPHILS RELATIVE PERCENT: 6.4 %
EPITHELIAL CELLS, UA: ABNORMAL /HPF
GFR AFRICAN AMERICAN: >60
GFR NON-AFRICAN AMERICAN: >60
GLUCOSE BLD-MCNC: 143 MG/DL (ref 70–99)
GLUCOSE URINE: NEGATIVE MG/DL
HCT VFR BLD CALC: 43.3 % (ref 36–48)
HEMOGLOBIN: 14.7 G/DL (ref 12–16)
INR BLD: 1.11 (ref 0.86–1.14)
KETONES, URINE: NEGATIVE MG/DL
LEUKOCYTE ESTERASE, URINE: NEGATIVE
LYMPHOCYTES ABSOLUTE: 2.5 K/UL (ref 1–5.1)
LYMPHOCYTES RELATIVE PERCENT: 37.4 %
MCH RBC QN AUTO: 30.8 PG (ref 26–34)
MCHC RBC AUTO-ENTMCNC: 33.9 G/DL (ref 31–36)
MCV RBC AUTO: 90.9 FL (ref 80–100)
MICROSCOPIC EXAMINATION: YES
MONOCYTES ABSOLUTE: 0.6 K/UL (ref 0–1.3)
MONOCYTES RELATIVE PERCENT: 8.9 %
NEUTROPHILS ABSOLUTE: 3.1 K/UL (ref 1.7–7.7)
NEUTROPHILS RELATIVE PERCENT: 46.3 %
NITRITE, URINE: NEGATIVE
PDW BLD-RTO: 13.6 % (ref 12.4–15.4)
PH UA: 5.5
PLATELET # BLD: 206 K/UL (ref 135–450)
PMV BLD AUTO: 7.9 FL (ref 5–10.5)
POTASSIUM REFLEX MAGNESIUM: 3.6 MMOL/L (ref 3.5–5.1)
PROTEIN UA: NEGATIVE MG/DL
PROTHROMBIN TIME: 12.6 SEC (ref 9.8–13)
RBC # BLD: 4.76 M/UL (ref 4–5.2)
RBC UA: ABNORMAL /HPF (ref 0–2)
SODIUM BLD-SCNC: 136 MMOL/L (ref 136–145)
SPECIFIC GRAVITY UA: <=1.005
TOTAL CK: 36 U/L (ref 26–192)
URINE TYPE: ABNORMAL
UROBILINOGEN, URINE: 0.2 E.U./DL
WBC # BLD: 6.7 K/UL (ref 4–11)
WBC UA: ABNORMAL /HPF (ref 0–5)

## 2018-09-19 PROCEDURE — 6370000000 HC RX 637 (ALT 250 FOR IP)

## 2018-09-19 PROCEDURE — 36415 COLL VENOUS BLD VENIPUNCTURE: CPT

## 2018-09-19 PROCEDURE — 85025 COMPLETE CBC W/AUTO DIFF WBC: CPT

## 2018-09-19 PROCEDURE — 85610 PROTHROMBIN TIME: CPT

## 2018-09-19 PROCEDURE — 99283 EMERGENCY DEPT VISIT LOW MDM: CPT

## 2018-09-19 PROCEDURE — 82550 ASSAY OF CK (CPK): CPT

## 2018-09-19 PROCEDURE — 80048 BASIC METABOLIC PNL TOTAL CA: CPT

## 2018-09-19 PROCEDURE — 81001 URINALYSIS AUTO W/SCOPE: CPT

## 2018-09-19 RX ORDER — ACETAMINOPHEN 325 MG/1
TABLET ORAL
Status: COMPLETED
Start: 2018-09-19 | End: 2018-09-19

## 2018-09-19 RX ORDER — ACETAMINOPHEN 325 MG/1
650 TABLET ORAL ONCE
Status: COMPLETED | OUTPATIENT
Start: 2018-09-19 | End: 2018-09-19

## 2018-09-19 RX ADMIN — ACETAMINOPHEN 650 MG: 325 TABLET ORAL at 19:35

## 2018-09-19 ASSESSMENT — PAIN SCALES - GENERAL
PAINLEVEL_OUTOF10: 3
PAINLEVEL_OUTOF10: 0

## 2018-09-19 NOTE — ED PROVIDER NOTES
MG capsule Take 250 mg by mouth daily      digoxin (LANOXIN) 125 MCG tablet Take 125 mcg by mouth daily      Fesoterodine Fumarate ER (TOVIAZ) 8 MG TB24 Take 1 tablet by mouth daily  Qty: 90 tablet, Refills: 0      Blood Glucose Monitoring Suppl ([address]) W/DEVICE KIT Patient tests once daily. DX:E11.9  Qty: 1 kit, Refills: 0      ONE TOUCH LANCETS MISC Patient tests once daily. DX:E11.9  Qty: 150 each, Refills: 3      olopatadine (PATADAY) 0.2 % SOLN ophthalmic solution Apply 1 drop to eye daily Both eyes      niacin 500 MG extended release capsule Take 500 mg by mouth nightly      ammonium lactate (LAC-HYDRIN) 12 % lotion Apply topically daily. Qty: 120 g, Refills: 2      glucose blood VI test strips (ONE TOUCH TEST STRIPS) strip Patient tests once daily. DX: E11.9  Qty: 150 each, Refills: 3             ALLERGIES     Tape [adhesive tape] and Erythromycin    FAMILY HISTORY     History reviewed. No pertinent family history. SOCIAL HISTORY       Social History     Social History    Marital status:      Spouse name: N/A    Number of children: N/A    Years of education: N/A     Social History Main Topics    Smoking status: Never Smoker    Smokeless tobacco: Never Used    Alcohol use No    Drug use: No    Sexual activity: Not Currently     Other Topics Concern    None     Social History Narrative    None       SCREENINGS             PHYSICAL EXAM    (up to 7 for level 4, 8 or more for level 5)     ED Triage Vitals [09/19/18 1419]   BP Temp Temp Source Pulse Resp SpO2 Height Weight   (!) 150/100 98.3 °F (36.8 °C) Oral 62 18 95 % -- 287 lb (130.2 kg)           PHYSICAL EXAM    VITAL SIGNS: BP (!) 124/57   Pulse 86   Temp 98.3 °F (36.8 °C) (Oral)   Resp 18   Wt 287 lb (130.2 kg)   SpO2 94%   BMI 56.05 kg/m²    Constitutional:  Well developed, Well nourished, No acute distress, Non-toxic appearance.    HENT:  Normocephalic, Atraumatic, Bilateral external ears normal, Oropharynx give hx, hx of stroke per family members FINDINGS: No pneumothorax, pleural effusion or focal airspace consolidation. Diffuse bronchial wall thickening with peripheral interstitial lines and hilar indistinctness. Heart size is within normal limits for AP technique. No acute osseous abnormality. Moderate interstitial edema. PROCEDURES   Unless otherwise noted below, none     Procedures    CRITICAL CARE TIME   N/A    CONSULTS:  None    EMERGENCY DEPARTMENT COURSE and DIFFERENTIAL DIAGNOSIS/MDM:   Vitals:    Vitals:    09/19/18 1419 09/19/18 1719   BP: (!) 150/100 (!) 124/57   Pulse: 62 86   Resp: 18 18   Temp: 98.3 °F (36.8 °C)    TempSrc: Oral    SpO2: 95% 94%   Weight: 287 lb (130.2 kg)        Anna Mcfadden is a 80 y.o. female who presented to the Emergency Department with painless hematuria. She is oral anticoagulants. There was no evidence of urinary tract infection or kidney injury. I recommended she hold her anticoagulants for now and discussed with her primary care provider with repeat urinalysis and possible further urologic workup if continued. There is no evidence of bleeding diathesis. Patient was given the following medications:  Medications - No data to display    The patient tolerated their visit well. The patient and / or the family were informed of the results of any tests, a time was given to answer questions. FINAL IMPRESSION      1.  Hematuria, unspecified type          DISPOSITION/PLAN   DISPOSITION Decision To Discharge 09/19/2018 06:12:21 PM      PATIENT REFERRED TO:  Dario Rivera [address] [place] [address]  [phone]    Call in 1 day        DISCHARGE MEDICATIONS:  Current Discharge Medication List          DISCONTINUED MEDICATIONS:  Current Discharge Medication List      STOP taking these medications       venlafaxine (EFFEXOR XR) 37.5 MG extended release capsule Comments:   Reason for Stopping:                      (Please note

## 2018-09-20 ENCOUNTER — TELEPHONE (OUTPATIENT)
Dept: INTERNAL MEDICINE CLINIC | Age: 83
End: 2018-09-20

## 2018-09-20 NOTE — TELEPHONE ENCOUNTER
----- Message from Leisa Muñoz MD sent at 9/20/2018 10:41 AM EDT -----  Contact: Meghan [phone]  Hold for 3 days, then resume    ----- Message -----  From: Particia Audi  Sent: 9/20/2018   9:20 AM  To: Leisa Muñoz MD    Pt's daughter wants to know if pt should take blood thinner tonight? Pt did not take it last night. Last took it yesterday morning. Please advise.  ----- Message -----  From: Leisa Muñoz MD  Sent: 9/19/2018   6:38 PM  To: Rocio Michel    Not needed unless symptoms recur  ----- Message -----  From: Parry Goldberg  Sent: 9/19/2018   3:46 PM  To: Leisa Muñoz MD    Pt needs a hospital follow up within 3-4 days, no openings.  Pt was seen for hematuria  [phone]  Last appt 9/6  Next appt 12/10    ks

## 2018-10-10 ENCOUNTER — APPOINTMENT (OUTPATIENT)
Dept: GENERAL RADIOLOGY | Age: 83
End: 2018-10-10
Payer: MEDICARE

## 2018-10-10 ENCOUNTER — HOSPITAL ENCOUNTER (EMERGENCY)
Age: 83
Discharge: HOME | End: 2018-10-10
Payer: MEDICARE

## 2018-10-10 VITALS
TEMPERATURE: 99 F | SYSTOLIC BLOOD PRESSURE: 122 MMHG | HEIGHT: 60 IN | BODY MASS INDEX: 53.4 KG/M2 | RESPIRATION RATE: 18 BRPM | OXYGEN SATURATION: 94 % | HEART RATE: 74 BPM | WEIGHT: 272 LBS | DIASTOLIC BLOOD PRESSURE: 59 MMHG
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
## 2018-10-10 DIAGNOSIS — S92.422B OPEN DISPLACED FRACTURE OF DISTAL PHALANX OF LEFT GREAT TOE, INITIAL ENCOUNTER: ICD-10-CM

## 2018-10-10 PROCEDURE — 6360000002 HC RX W HCPCS: Performed by: PHYSICIAN ASSISTANT

## 2018-10-10 PROCEDURE — 4500000024 HC ED LEVEL 4 PROCEDURE

## 2018-10-10 PROCEDURE — 99284 EMERGENCY DEPT VISIT MOD MDM: CPT

## 2018-10-10 PROCEDURE — 2580000003 HC RX 258: Performed by: PHYSICIAN ASSISTANT

## 2018-10-10 PROCEDURE — 90715 TDAP VACCINE 7 YRS/> IM: CPT | Performed by: PHYSICIAN ASSISTANT

## 2018-10-10 PROCEDURE — 73660 X-RAY EXAM OF TOE(S): CPT

## 2018-10-10 PROCEDURE — 90471 IMMUNIZATION ADMIN: CPT | Performed by: PHYSICIAN ASSISTANT

## 2018-10-10 RX ORDER — HYDROCODONE BITARTRATE AND ACETAMINOPHEN 5; 325 MG/1; MG/1
1 TABLET ORAL EVERY 6 HOURS PRN
Qty: 10 TABLET | Refills: 0 | Status: SHIPPED | OUTPATIENT
Start: 2018-10-10 | End: 2018-10-13

## 2018-10-10 RX ORDER — AMOXICILLIN AND CLAVULANATE POTASSIUM 875; 125 MG/1; MG/1
1 TABLET, FILM COATED ORAL 2 TIMES DAILY
Qty: 20 TABLET | Refills: 0 | Status: SHIPPED | OUTPATIENT
Start: 2018-10-10 | End: 2018-10-20

## 2018-10-10 RX ADMIN — TETANUS TOXOID, REDUCED DIPHTHERIA TOXOID AND ACELLULAR PERTUSSIS VACCINE, ADSORBED 0.5 ML: 5; 2.5; 8; 8; 2.5 SUSPENSION INTRAMUSCULAR at 17:49

## 2018-10-10 RX ADMIN — CEFAZOLIN 1 G: 1 INJECTION, POWDER, FOR SOLUTION INTRAMUSCULAR; INTRAVENOUS at 20:31

## 2018-10-10 ASSESSMENT — PAIN DESCRIPTION - ORIENTATION: ORIENTATION: LEFT

## 2018-10-10 ASSESSMENT — PAIN SCALES - GENERAL: PAINLEVEL_OUTOF10: 4

## 2018-10-10 ASSESSMENT — PAIN DESCRIPTION - PAIN TYPE: TYPE: ACUTE PAIN

## 2018-10-10 ASSESSMENT — PAIN DESCRIPTION - FREQUENCY: FREQUENCY: CONTINUOUS

## 2018-10-10 ASSESSMENT — PAIN DESCRIPTION - DESCRIPTORS: DESCRIPTORS: ACHING

## 2018-10-10 ASSESSMENT — PAIN DESCRIPTION - LOCATION: LOCATION: FOOT

## 2018-10-10 NOTE — ED PROVIDER NOTES
no discharge. Neck: Normal range of motion. Neck supple. Cardiovascular: Normal rate, regular rhythm, normal heart sounds and intact distal pulses. Exam reveals no gallop and no friction rub. No murmur heard. Pulmonary/Chest: Effort normal and breath sounds normal. No respiratory distress. She has no wheezes. She has no rales. She exhibits no tenderness. Musculoskeletal:        Left foot: There is tenderness and laceration (Laceration of the left great toe along the first MTP joints. It starts on the dorsum wrapping around the lateral aspect to the plantar aspect. Mildly bleeding with adipose tissue noted. ). There is normal range of motion, no bony tenderness, no swelling, normal capillary refill, no crepitus and no deformity. Left great toe has no decreased range of motion. It is still moving. Extensor tendon is visualized without any laceration noted. Neurological: She is alert and oriented to person, place, and time. Skin: Skin is warm and dry. She is not diaphoretic. No pallor. Nursing note and vitals reviewed. DIAGNOSTIC RESULTS   LABS:    Labs Reviewed - No data to display    All other labs were within normal range or not returned as of this dictation. EKG: All EKG's are interpreted by the Emergency Department Physician who either signs orCo-signs this chart in the absence of a cardiologist.  Please see their note for interpretation of EKG. RADIOLOGY:   Non-plain film images such as CT, Ultrasound and MRI are read by the radiologist. Plain radiographic images are visualized andpreliminarily interpreted by the  ED Provider with the below findings:        Interpretation perthe Radiologist below, if available at the time of this note:    XR TOE LEFT (MIN 2 VIEWS)   Final Result   Comminuted intra-articular fracture of the distal aspect of the proximal   phalanx of the 1st digit. No results found.       PROCEDURES   Unless otherwise noted below, none     Lac

## 2018-10-11 NOTE — ED NOTES
Lupillo taping completed and pt. Had post op shoe applied. Supplies given to family for home use.      Steve Dumont RN  10/10/18 3284

## 2018-10-12 ENCOUNTER — TELEPHONE (OUTPATIENT)
Dept: INTERNAL MEDICINE CLINIC | Age: 83
End: 2018-10-12

## 2018-10-16 ENCOUNTER — OFFICE VISIT (OUTPATIENT)
Dept: ORTHOPEDIC SURGERY | Age: 83
End: 2018-10-16
Payer: MEDICARE

## 2018-10-16 VITALS
HEART RATE: 76 BPM | SYSTOLIC BLOOD PRESSURE: 133 MMHG | BODY MASS INDEX: 53.41 KG/M2 | DIASTOLIC BLOOD PRESSURE: 78 MMHG | HEIGHT: 60 IN | WEIGHT: 272.05 LBS

## 2018-10-16 DIAGNOSIS — S92.422A DISPLACED FRACTURE OF DISTAL PHALANX OF LEFT GREAT TOE, INITIAL ENCOUNTER FOR CLOSED FRACTURE: Primary | ICD-10-CM

## 2018-10-16 PROCEDURE — G8417 CALC BMI ABV UP PARAM F/U: HCPCS | Performed by: ORTHOPAEDIC SURGERY

## 2018-10-16 PROCEDURE — 1123F ACP DISCUSS/DSCN MKR DOCD: CPT | Performed by: ORTHOPAEDIC SURGERY

## 2018-10-16 PROCEDURE — G8598 ASA/ANTIPLAT THER USED: HCPCS | Performed by: ORTHOPAEDIC SURGERY

## 2018-10-16 PROCEDURE — G8427 DOCREV CUR MEDS BY ELIG CLIN: HCPCS | Performed by: ORTHOPAEDIC SURGERY

## 2018-10-16 PROCEDURE — 1090F PRES/ABSN URINE INCON ASSESS: CPT | Performed by: ORTHOPAEDIC SURGERY

## 2018-10-16 PROCEDURE — 1036F TOBACCO NON-USER: CPT | Performed by: ORTHOPAEDIC SURGERY

## 2018-10-16 PROCEDURE — 4040F PNEUMOC VAC/ADMIN/RCVD: CPT | Performed by: ORTHOPAEDIC SURGERY

## 2018-10-16 PROCEDURE — G8400 PT W/DXA NO RESULTS DOC: HCPCS | Performed by: ORTHOPAEDIC SURGERY

## 2018-10-16 PROCEDURE — 1101F PT FALLS ASSESS-DOCD LE1/YR: CPT | Performed by: ORTHOPAEDIC SURGERY

## 2018-10-16 PROCEDURE — 28490 TREAT BIG TOE FRACTURE: CPT | Performed by: ORTHOPAEDIC SURGERY

## 2018-10-16 PROCEDURE — G8482 FLU IMMUNIZE ORDER/ADMIN: HCPCS | Performed by: ORTHOPAEDIC SURGERY

## 2018-10-16 PROCEDURE — 99203 OFFICE O/P NEW LOW 30 MIN: CPT | Performed by: ORTHOPAEDIC SURGERY

## 2018-10-16 NOTE — PROGRESS NOTES
Chief Complaint    Injury (Let foot toe fx seen in [facility] 10/10/2018 , patient fell into her bed and must had injured her toe then. )      History of Present Illness:  [place] is a 80 y.o. female who is here for evaluation chief complaint of left foot pain and swelling. She with her  family member in the states that she is diabetic and had a stroke in 2012. She is unable to move the left upper and lower extremity. Her  was trying to transfer and 10/10/18 and she is on her great toe caught and twisted her foot. She had an open great toe fracture in the toe was sutured in the emergency room on the same day. She was referred here for definitive care. They state that she is kept her foot up most time but since she's been here today her foot is Very swollen. Denies fever or chills no significant sugars. She does standing pivot transfers with assist only. States her pain is around a 7 out of 10    Medical History:  Patient's medications, allergies, past medical, surgical, social and family histories were reviewed and updated as appropriate. Review of Systems:  Pertinent items are noted in HPI  Review of systems reviewed from Patient History Form dated on 10/16/18 and available in the patient's chart under the Media tab. Vital Signs:  /78   Pulse 76   Ht 4' 11.84\" (1.52 m)   Wt 272 lb 0.8 oz (123.4 kg)   BMI 53.41 kg/m²     General Exam:   Constitutional: Patient is adequately groomed with no evidence of malnutrition  Mental Status: The patient is oriented to time, place and person. The patient's mood and affect are appropriate. Lymphatic: The lymphatic examination bilaterally reveals all areas to be without enlargement or induration. Vascular: Examination reveals no swelling or calf tenderness. Peripheral pulses are palpable and 2+.     Foot Examination:    Inspection:  Moderate swelling left great toe suture wounds on the dorsal and plantar aspect of the toe

## 2018-10-25 ENCOUNTER — OFFICE VISIT (OUTPATIENT)
Dept: ORTHOPEDIC SURGERY | Age: 83
End: 2018-10-25

## 2018-10-25 DIAGNOSIS — S92.422A DISPLACED FRACTURE OF DISTAL PHALANX OF LEFT GREAT TOE, INITIAL ENCOUNTER FOR CLOSED FRACTURE: ICD-10-CM

## 2018-10-25 DIAGNOSIS — M79.672 FOOT PAIN, LEFT: Primary | ICD-10-CM

## 2018-10-25 PROCEDURE — 99024 POSTOP FOLLOW-UP VISIT: CPT | Performed by: ORTHOPAEDIC SURGERY

## 2018-11-08 ENCOUNTER — OFFICE VISIT (OUTPATIENT)
Dept: ORTHOPEDIC SURGERY | Age: 83
End: 2018-11-08

## 2018-11-08 DIAGNOSIS — S92.422A DISPLACED FRACTURE OF DISTAL PHALANX OF LEFT GREAT TOE, INITIAL ENCOUNTER FOR CLOSED FRACTURE: Primary | ICD-10-CM

## 2018-11-08 PROCEDURE — 99024 POSTOP FOLLOW-UP VISIT: CPT | Performed by: ORTHOPAEDIC SURGERY

## 2018-11-09 RX ORDER — SIMVASTATIN 20 MG
20 TABLET ORAL NIGHTLY
Qty: 90 TABLET | Refills: 0 | Status: SHIPPED | OUTPATIENT
Start: 2018-11-09 | End: 2019-01-21 | Stop reason: SDUPTHER

## 2018-11-16 RX ORDER — METOPROLOL SUCCINATE 50 MG/1
TABLET, EXTENDED RELEASE ORAL
Qty: 90 TABLET | Refills: 0 | Status: SHIPPED | OUTPATIENT
Start: 2018-11-16 | End: 2019-02-03 | Stop reason: SDUPTHER

## 2018-11-26 RX ORDER — SPIRONOLACTONE AND HYDROCHLOROTHIAZIDE 25; 25 MG/1; MG/1
1 TABLET ORAL DAILY
Qty: 90 TABLET | Refills: 0 | Status: SHIPPED | OUTPATIENT
Start: 2018-11-26 | End: 2019-02-21 | Stop reason: SDUPTHER

## 2018-11-26 NOTE — TELEPHONE ENCOUNTER
From: [place]  Sent: 11/24/2018 2:10 AM EST  Subject: Medication Renewal Request    Josi Jazzy.  Connie Rosibel would like a refill of the following medications:     spironolactone-hydrochlorothiazide (ALDACTAZIDE) 25-25 MG per tablet Carine Hyatt MD]    Preferred pharmacy: [place] [address] [phone] - F [phone]    Comment:

## 2018-12-10 ENCOUNTER — OFFICE VISIT (OUTPATIENT)
Dept: INTERNAL MEDICINE CLINIC | Age: 83
End: 2018-12-10

## 2018-12-10 VITALS — DIASTOLIC BLOOD PRESSURE: 78 MMHG | SYSTOLIC BLOOD PRESSURE: 105 MMHG | HEART RATE: 70 BPM | RESPIRATION RATE: 18 BRPM

## 2018-12-10 DIAGNOSIS — G81.90 HEMIPLEGIA AFFECTING NONDOMINANT SIDE (HCC): Chronic | ICD-10-CM

## 2018-12-10 DIAGNOSIS — E11.9 TYPE 2 DIABETES MELLITUS WITHOUT COMPLICATION, WITHOUT LONG-TERM CURRENT USE OF INSULIN (HCC): Primary | Chronic | ICD-10-CM

## 2018-12-10 DIAGNOSIS — I48.0 PAROXYSMAL ATRIAL FIBRILLATION (HCC): Chronic | ICD-10-CM

## 2018-12-10 DIAGNOSIS — E66.01 MORBID OBESITY WITH BMI OF 50.0-59.9, ADULT (HCC): Chronic | ICD-10-CM

## 2018-12-10 DIAGNOSIS — E11.9 TYPE 2 DIABETES MELLITUS WITHOUT COMPLICATION, WITHOUT LONG-TERM CURRENT USE OF INSULIN (HCC): Chronic | ICD-10-CM

## 2018-12-10 DIAGNOSIS — I10 ESSENTIAL HYPERTENSION: Chronic | ICD-10-CM

## 2018-12-10 DIAGNOSIS — F32.9 REACTIVE DEPRESSION: Chronic | ICD-10-CM

## 2018-12-10 DIAGNOSIS — Z79.01 ANTICOAGULATED: Chronic | ICD-10-CM

## 2018-12-10 PROBLEM — R79.89 ELEVATED BRAIN NATRIURETIC PEPTIDE (BNP) LEVEL: Status: RESOLVED | Noted: 2018-08-26 | Resolved: 2018-12-10

## 2018-12-10 LAB
BILIRUBIN, POC: NORMAL
BLOOD URINE, POC: NORMAL
CLARITY, POC: NORMAL
COLOR, POC: NORMAL
CREATININE URINE: 57.9 MG/DL (ref 28–259)
GLUCOSE URINE, POC: NORMAL
KETONES, POC: NORMAL
LEUKOCYTE EST, POC: NORMAL
MICROALBUMIN UR-MCNC: 8 MG/DL
MICROALBUMIN/CREAT UR-RTO: 138.2 MG/G (ref 0–30)
NITRITE, POC: NORMAL
PH, POC: NORMAL
PROTEIN, POC: NORMAL
SPECIFIC GRAVITY, POC: NORMAL
UROBILINOGEN, POC: NORMAL

## 2018-12-10 PROCEDURE — 1101F PT FALLS ASSESS-DOCD LE1/YR: CPT | Performed by: INTERNAL MEDICINE

## 2018-12-10 PROCEDURE — G8482 FLU IMMUNIZE ORDER/ADMIN: HCPCS | Performed by: INTERNAL MEDICINE

## 2018-12-10 PROCEDURE — 99214 OFFICE O/P EST MOD 30 MIN: CPT | Performed by: INTERNAL MEDICINE

## 2018-12-10 PROCEDURE — 1036F TOBACCO NON-USER: CPT | Performed by: INTERNAL MEDICINE

## 2018-12-10 PROCEDURE — 81002 URINALYSIS NONAUTO W/O SCOPE: CPT | Performed by: INTERNAL MEDICINE

## 2018-12-10 PROCEDURE — G8400 PT W/DXA NO RESULTS DOC: HCPCS | Performed by: INTERNAL MEDICINE

## 2018-12-10 PROCEDURE — G8598 ASA/ANTIPLAT THER USED: HCPCS | Performed by: INTERNAL MEDICINE

## 2018-12-10 PROCEDURE — 1090F PRES/ABSN URINE INCON ASSESS: CPT | Performed by: INTERNAL MEDICINE

## 2018-12-10 PROCEDURE — G8417 CALC BMI ABV UP PARAM F/U: HCPCS | Performed by: INTERNAL MEDICINE

## 2018-12-10 PROCEDURE — G8427 DOCREV CUR MEDS BY ELIG CLIN: HCPCS | Performed by: INTERNAL MEDICINE

## 2018-12-10 PROCEDURE — 1123F ACP DISCUSS/DSCN MKR DOCD: CPT | Performed by: INTERNAL MEDICINE

## 2018-12-10 PROCEDURE — 4040F PNEUMOC VAC/ADMIN/RCVD: CPT | Performed by: INTERNAL MEDICINE

## 2018-12-10 NOTE — PATIENT INSTRUCTIONS
Patient Self-Management Goal for Health Maintenance  Goal: I will schedule a yearly preventative care visit.   Barriers: none  Plan for overcoming my barriers: N/A  Confidence: 10/10  Anticipated Goal Completion Date: 03/10/19

## 2018-12-10 NOTE — PROGRESS NOTES
dusty  Heart: irregular rate and rhythm, S1, S2 normal, no murmur, click, rub or gallop   Abdomen: soft, non-tender; bowel sounds normal; no masses, no organomegaly   Extremities: extremities normal, atraumatic, dependant edema  Pulses: 2+ and symmetric   Edema - dependant edema , left great toe wound healed well   Neurologic: Grossly normal, left sided cva with chronic weakness . Wheel chair bound         Echo 8/18  Left ventricular systolic function appears hyperdynamic with ejection   fraction estimated at 60-65 %   There is mild concentric left ventricular hypertrophy.   Grade III diastolic dysfunction with elevated filling pressure.   Biatrial enlargement.   The aortic valve is thickened/calcified with decreased leaflet mobility   consistent with aortic stenosis.   Mild aortic stenosis.   Mitral annular calcification is present.   Mild to moderate mitral regurgitation.   Moderate tricuspid regurgitation.   Systolic pulmonary artery pressure (SPAP) estimated at 40 mmHg (right atrial   pressure 3 mmHg), consistent with mild pulmonary hypertension      Assessment:        Diagnosis Orders   1. Type 2 diabetes mellitus without complication, without long-term current use of insulin ([facility])  MICROALBUMIN / CREATININE URINE RATIO   2. Paroxysmal atrial fibrillation (HCC)     3. Hemiplegia affecting nondominant side s/p CVA     4. Morbid obesity with BMI of 50.0-59.9, adult ([facility] [address]     5. Essential hypertension     6. Reactive depression     7. Anticoagulated on Pradaxa             Plan:         Afibb - rate controlled - on digoxin, b blocker. Was on coumadin , now on pradaxa by Dr. Keiry Bradley ( 3/17)  stable digoxin levels    HTN - stable on metorprolol, aldactazide , Lasix     Dm- 2 -well controlled. stable on metformin , last  A1c at 5.9 . Home sugars stable     Diastolic CHF -well compensated -now on lasix 40 mg daily, tolerating well.  . Low salt diet advised       Cva-with left sided hemiparesis  thought to be embolic -from

## 2018-12-12 ENCOUNTER — TELEPHONE (OUTPATIENT)
Dept: INTERNAL MEDICINE CLINIC | Age: 83
End: 2018-12-12

## 2018-12-12 NOTE — TELEPHONE ENCOUNTER
----- Message from Deborah Calles MD sent at 12/12/2018 11:33 AM EST -----  Contact: robert-caregiver  I told to cut down toviaz to half    ----- Message -----  From: Genevieve Burleson  Sent: 12/12/2018  10:58 AM  To: Deborah Calles MD    Pt states you increased one of her meds at her visit Monday and doesn't remember what it was. Please advise.

## 2018-12-13 ENCOUNTER — TELEPHONE (OUTPATIENT)
Dept: INTERNAL MEDICINE CLINIC | Age: 83
End: 2018-12-13

## 2018-12-13 LAB
ORGANISM: ABNORMAL
URINE CULTURE, ROUTINE: ABNORMAL
URINE CULTURE, ROUTINE: ABNORMAL

## 2018-12-13 NOTE — TELEPHONE ENCOUNTER
----- Message from Kris Herring sent at 12/13/2018  8:59 AM EST -----  Contact: robert-caregiver      ----- Message -----  From: Gilson Choudhury MD  Sent: 12/12/2018   6:33 PM  To: 18 [employer]    She complained of side effects    ----- Message -----  From: [employer]  Sent: 12/12/2018   2:29 PM  To: Gilson Choudhury MD    Pt's daughter, Meghan, wanting to know why you cut this down?  ----- Message -----  From: Gilson Choudhury MD  Sent: 12/12/2018  11:33 AM  To: [employer]    I told to cut down toviaz to half    ----- Message -----  From: [employer]  Sent: 12/12/2018  10:58 AM  To: Gilson Choudhury MD    Pt states you increased one of her meds at her visit Monday and doesn't remember what it was. Please advise.

## 2018-12-14 ENCOUNTER — TELEPHONE (OUTPATIENT)
Dept: INTERNAL MEDICINE CLINIC | Age: 83
End: 2018-12-14

## 2018-12-14 RX ORDER — AMPICILLIN 500 MG/1
500 CAPSULE ORAL 3 TIMES DAILY
Qty: 21 CAPSULE | Refills: 0 | Status: SHIPPED | OUTPATIENT
Start: 2018-12-14 | End: 2018-12-21

## 2018-12-17 ENCOUNTER — TELEPHONE (OUTPATIENT)
Dept: INTERNAL MEDICINE CLINIC | Age: 83
End: 2018-12-17

## 2018-12-17 RX ORDER — BENZONATATE 100 MG/1
100 CAPSULE ORAL 3 TIMES DAILY PRN
Qty: 20 CAPSULE | Refills: 0 | Status: SHIPPED | OUTPATIENT
Start: 2018-12-17 | End: 2018-12-24

## 2018-12-17 NOTE — TELEPHONE ENCOUNTER
----- Message from Jyothi [place] sent at 12/13/2018  8:59 AM EST -----  Contact: robert-caregiver      ----- Message -----  From: Deborah Calles MD  Sent: 12/12/2018   6:33 PM  To: [employer]    She complained of side effects    ----- Message -----  From: [employer]  Sent: 12/12/2018   2:29 PM  To: Deborah Calles MD    Pt's daughter, Meghan, wanting to know why you cut this down?  ----- Message -----  From: Deborah Calles MD  Sent: 12/12/2018  11:33 AM  To: [employer]    I told to cut down toviaz to half    ----- Message -----  From: [employer]  Sent: 12/12/2018  10:58 AM  To: Deborah Calles MD    Pt states you increased one of her meds at her visit Monday and doesn't remember what it was. Please advise.

## 2019-01-08 RX ORDER — OMEPRAZOLE 40 MG/1
40 CAPSULE, DELAYED RELEASE ORAL DAILY
Qty: 90 CAPSULE | Refills: 0 | Status: SHIPPED | OUTPATIENT
Start: 2019-01-08 | End: 2019-06-13 | Stop reason: SDUPTHER

## 2019-01-08 RX ORDER — QUETIAPINE FUMARATE 25 MG/1
25 TABLET, FILM COATED ORAL NIGHTLY
Qty: 90 TABLET | Refills: 0 | Status: SHIPPED | OUTPATIENT
Start: 2019-01-08 | End: 2019-04-01 | Stop reason: SDUPTHER

## 2019-01-08 RX ORDER — FUROSEMIDE 40 MG/1
40 TABLET ORAL DAILY
Qty: 90 TABLET | Refills: 0 | Status: SHIPPED | OUTPATIENT
Start: 2019-01-08 | End: 2019-04-01 | Stop reason: SDUPTHER

## 2019-01-21 RX ORDER — SIMVASTATIN 20 MG
20 TABLET ORAL NIGHTLY
Qty: 90 TABLET | Refills: 0 | Status: SHIPPED | OUTPATIENT
Start: 2019-01-21 | End: 2019-04-01 | Stop reason: SDUPTHER

## 2019-01-21 RX ORDER — ESCITALOPRAM OXALATE 20 MG/1
20 TABLET ORAL DAILY
Qty: 90 TABLET | Refills: 0 | Status: SHIPPED | OUTPATIENT
Start: 2019-01-21 | End: 2019-04-01 | Stop reason: SDUPTHER

## 2019-01-23 ENCOUNTER — TELEPHONE (OUTPATIENT)
Dept: ORTHOPEDIC SURGERY | Age: 84
End: 2019-01-23

## 2019-01-31 ENCOUNTER — PATIENT MESSAGE (OUTPATIENT)
Dept: INTERNAL MEDICINE CLINIC | Age: 84
End: 2019-01-31

## 2019-02-01 RX ORDER — FESOTERODINE FUMARATE 8 MG/1
1 TABLET, EXTENDED RELEASE ORAL DAILY
Qty: 90 TABLET | Refills: 0 | Status: SHIPPED | OUTPATIENT
Start: 2019-02-01 | End: 2019-02-05

## 2019-02-04 RX ORDER — METOPROLOL SUCCINATE 50 MG/1
TABLET, EXTENDED RELEASE ORAL
Qty: 90 TABLET | Refills: 0 | Status: SHIPPED | OUTPATIENT
Start: 2019-02-04 | End: 2019-04-01 | Stop reason: SDUPTHER

## 2019-02-05 RX ORDER — FESOTERODINE FUMARATE 8 MG/1
1 TABLET, EXTENDED RELEASE ORAL DAILY
Qty: 90 TABLET | Refills: 0 | Status: SHIPPED | OUTPATIENT
Start: 2019-02-05 | End: 2019-03-27 | Stop reason: SDUPTHER

## 2019-02-06 DIAGNOSIS — G43.809 OTHER MIGRAINE WITHOUT STATUS MIGRAINOSUS, NOT INTRACTABLE: ICD-10-CM

## 2019-02-11 RX ORDER — BUTALBITAL, ACETAMINOPHEN, CAFFEINE AND CODEINE PHOSPHATE 300; 50; 40; 30 MG/1; MG/1; MG/1; MG/1
CAPSULE ORAL
Qty: 30 CAPSULE | Refills: 0 | Status: SHIPPED | OUTPATIENT
Start: 2019-02-11 | End: 2020-04-20 | Stop reason: SDUPTHER

## 2019-02-21 ENCOUNTER — PATIENT MESSAGE (OUTPATIENT)
Dept: INTERNAL MEDICINE CLINIC | Age: 84
End: 2019-02-21

## 2019-02-21 RX ORDER — CEPHALEXIN 250 MG/1
250 CAPSULE ORAL DAILY
Qty: 90 CAPSULE | Refills: 0 | Status: SHIPPED | OUTPATIENT
Start: 2019-02-21 | End: 2019-05-08 | Stop reason: SDUPTHER

## 2019-02-21 RX ORDER — DIGOXIN 125 MCG
125 TABLET ORAL DAILY
Qty: 90 TABLET | Refills: 0 | Status: SHIPPED | OUTPATIENT
Start: 2019-02-21 | End: 2019-04-01 | Stop reason: SDUPTHER

## 2019-02-21 RX ORDER — SPIRONOLACTONE AND HYDROCHLOROTHIAZIDE 25; 25 MG/1; MG/1
1 TABLET ORAL DAILY
Qty: 90 TABLET | Refills: 0 | Status: SHIPPED | OUTPATIENT
Start: 2019-02-21 | End: 2019-04-01 | Stop reason: SDUPTHER

## 2019-03-12 ENCOUNTER — OFFICE VISIT (OUTPATIENT)
Dept: INTERNAL MEDICINE CLINIC | Age: 84
End: 2019-03-12

## 2019-03-12 ENCOUNTER — PATIENT MESSAGE (OUTPATIENT)
Dept: INTERNAL MEDICINE CLINIC | Age: 84
End: 2019-03-12

## 2019-03-12 VITALS
HEART RATE: 70 BPM | SYSTOLIC BLOOD PRESSURE: 138 MMHG | BODY MASS INDEX: 53.13 KG/M2 | HEIGHT: 60 IN | DIASTOLIC BLOOD PRESSURE: 75 MMHG | RESPIRATION RATE: 18 BRPM

## 2019-03-12 DIAGNOSIS — F32.9 REACTIVE DEPRESSION: Chronic | ICD-10-CM

## 2019-03-12 DIAGNOSIS — E11.9 TYPE 2 DIABETES MELLITUS WITHOUT COMPLICATION, WITHOUT LONG-TERM CURRENT USE OF INSULIN (HCC): Chronic | ICD-10-CM

## 2019-03-12 DIAGNOSIS — G81.90 HEMIPLEGIA AFFECTING NONDOMINANT SIDE (HCC): Chronic | ICD-10-CM

## 2019-03-12 DIAGNOSIS — I48.0 PAROXYSMAL ATRIAL FIBRILLATION (HCC): Chronic | ICD-10-CM

## 2019-03-12 DIAGNOSIS — I10 ESSENTIAL HYPERTENSION: Primary | Chronic | ICD-10-CM

## 2019-03-12 DIAGNOSIS — E66.01 MORBID OBESITY WITH BMI OF 50.0-59.9, ADULT (HCC): Chronic | ICD-10-CM

## 2019-03-12 LAB
A/G RATIO: 1.6 (ref 1.1–2.2)
ALBUMIN SERPL-MCNC: 4.3 G/DL (ref 3.4–5)
ALP BLD-CCNC: 124 U/L (ref 40–129)
ALT SERPL-CCNC: 23 U/L (ref 10–40)
ANION GAP SERPL CALCULATED.3IONS-SCNC: 18 MMOL/L (ref 3–16)
AST SERPL-CCNC: 30 U/L (ref 15–37)
BASOPHILS ABSOLUTE: 0.1 K/UL (ref 0–0.2)
BASOPHILS RELATIVE PERCENT: 0.8 %
BILIRUB SERPL-MCNC: 0.4 MG/DL (ref 0–1)
BUN BLDV-MCNC: 25 MG/DL (ref 7–20)
CALCIUM SERPL-MCNC: 9.6 MG/DL (ref 8.3–10.6)
CHLORIDE BLD-SCNC: 89 MMOL/L (ref 99–110)
CO2: 28 MMOL/L (ref 21–32)
CREAT SERPL-MCNC: 0.7 MG/DL (ref 0.6–1.2)
DIGOXIN LEVEL: 1.1 NG/ML (ref 0.8–2)
EOSINOPHILS ABSOLUTE: 0.5 K/UL (ref 0–0.6)
EOSINOPHILS RELATIVE PERCENT: 5.9 %
GFR AFRICAN AMERICAN: >60
GFR NON-AFRICAN AMERICAN: >60
GLOBULIN: 2.7 G/DL
GLUCOSE BLD-MCNC: 141 MG/DL (ref 70–99)
HCT VFR BLD CALC: 43.8 % (ref 36–48)
HEMOGLOBIN: 14.4 G/DL (ref 12–16)
LYMPHOCYTES ABSOLUTE: 3 K/UL (ref 1–5.1)
LYMPHOCYTES RELATIVE PERCENT: 34 %
MCH RBC QN AUTO: 30.1 PG (ref 26–34)
MCHC RBC AUTO-ENTMCNC: 32.8 G/DL (ref 31–36)
MCV RBC AUTO: 91.8 FL (ref 80–100)
MONOCYTES ABSOLUTE: 0.6 K/UL (ref 0–1.3)
MONOCYTES RELATIVE PERCENT: 6.6 %
NEUTROPHILS ABSOLUTE: 4.7 K/UL (ref 1.7–7.7)
NEUTROPHILS RELATIVE PERCENT: 52.7 %
PDW BLD-RTO: 13.4 % (ref 12.4–15.4)
PLATELET # BLD: 331 K/UL (ref 135–450)
PMV BLD AUTO: 7.8 FL (ref 5–10.5)
POTASSIUM SERPL-SCNC: 4 MMOL/L (ref 3.5–5.1)
RBC # BLD: 4.77 M/UL (ref 4–5.2)
SODIUM BLD-SCNC: 135 MMOL/L (ref 136–145)
TOTAL PROTEIN: 7 G/DL (ref 6.4–8.2)
TSH REFLEX: 3.42 UIU/ML (ref 0.27–4.2)
WBC # BLD: 8.9 K/UL (ref 4–11)

## 2019-03-12 PROCEDURE — 4040F PNEUMOC VAC/ADMIN/RCVD: CPT | Performed by: INTERNAL MEDICINE

## 2019-03-12 PROCEDURE — G8428 CUR MEDS NOT DOCUMENT: HCPCS | Performed by: INTERNAL MEDICINE

## 2019-03-12 PROCEDURE — 1101F PT FALLS ASSESS-DOCD LE1/YR: CPT | Performed by: INTERNAL MEDICINE

## 2019-03-12 PROCEDURE — 99214 OFFICE O/P EST MOD 30 MIN: CPT | Performed by: INTERNAL MEDICINE

## 2019-03-12 PROCEDURE — G8599 NO ASA/ANTIPLAT THER USE RNG: HCPCS | Performed by: INTERNAL MEDICINE

## 2019-03-12 PROCEDURE — G8482 FLU IMMUNIZE ORDER/ADMIN: HCPCS | Performed by: INTERNAL MEDICINE

## 2019-03-12 PROCEDURE — 1123F ACP DISCUSS/DSCN MKR DOCD: CPT | Performed by: INTERNAL MEDICINE

## 2019-03-12 PROCEDURE — 1090F PRES/ABSN URINE INCON ASSESS: CPT | Performed by: INTERNAL MEDICINE

## 2019-03-12 PROCEDURE — 1036F TOBACCO NON-USER: CPT | Performed by: INTERNAL MEDICINE

## 2019-03-12 PROCEDURE — G8417 CALC BMI ABV UP PARAM F/U: HCPCS | Performed by: INTERNAL MEDICINE

## 2019-03-12 PROCEDURE — G8400 PT W/DXA NO RESULTS DOC: HCPCS | Performed by: INTERNAL MEDICINE

## 2019-03-12 RX ORDER — LOSARTAN POTASSIUM 50 MG/1
50 TABLET ORAL DAILY
Qty: 90 TABLET | Refills: 0 | Status: SHIPPED | OUTPATIENT
Start: 2019-03-12 | End: 2019-03-27 | Stop reason: SDUPTHER

## 2019-03-13 LAB
ESTIMATED AVERAGE GLUCOSE: 157.1 MG/DL
HBA1C MFR BLD: 7.1 %

## 2019-03-28 RX ORDER — FESOTERODINE FUMARATE 8 MG/1
1 TABLET, EXTENDED RELEASE ORAL DAILY
Qty: 90 TABLET | Refills: 0 | Status: SHIPPED | OUTPATIENT
Start: 2019-03-28 | End: 2019-06-13 | Stop reason: SDUPTHER

## 2019-03-28 RX ORDER — LOSARTAN POTASSIUM 50 MG/1
50 TABLET ORAL DAILY
Qty: 90 TABLET | Refills: 0 | Status: SHIPPED | OUTPATIENT
Start: 2019-03-28 | End: 2019-06-13 | Stop reason: SDUPTHER

## 2019-04-01 RX ORDER — FUROSEMIDE 40 MG/1
40 TABLET ORAL DAILY
Qty: 90 TABLET | Refills: 0 | Status: SHIPPED | OUTPATIENT
Start: 2019-04-01 | End: 2019-06-13 | Stop reason: SDUPTHER

## 2019-04-01 RX ORDER — QUETIAPINE FUMARATE 25 MG/1
25 TABLET, FILM COATED ORAL NIGHTLY
Qty: 90 TABLET | Refills: 0 | Status: SHIPPED | OUTPATIENT
Start: 2019-04-01 | End: 2019-06-13 | Stop reason: SDUPTHER

## 2019-04-01 RX ORDER — ESCITALOPRAM OXALATE 20 MG/1
20 TABLET ORAL DAILY
Qty: 90 TABLET | Refills: 0 | Status: SHIPPED | OUTPATIENT
Start: 2019-04-01 | End: 2019-06-13 | Stop reason: SDUPTHER

## 2019-04-01 RX ORDER — DIGOXIN 125 MCG
125 TABLET ORAL DAILY
Qty: 90 TABLET | Refills: 0 | Status: SHIPPED | OUTPATIENT
Start: 2019-04-01 | End: 2019-06-13 | Stop reason: SDUPTHER

## 2019-04-01 RX ORDER — SIMVASTATIN 20 MG
20 TABLET ORAL NIGHTLY
Qty: 90 TABLET | Refills: 0 | Status: SHIPPED | OUTPATIENT
Start: 2019-04-01 | End: 2019-09-15 | Stop reason: SDUPTHER

## 2019-04-01 RX ORDER — SPIRONOLACTONE AND HYDROCHLOROTHIAZIDE 25; 25 MG/1; MG/1
1 TABLET ORAL DAILY
Qty: 90 TABLET | Refills: 0 | Status: SHIPPED | OUTPATIENT
Start: 2019-04-01 | End: 2019-06-13 | Stop reason: SDUPTHER

## 2019-04-01 RX ORDER — METOPROLOL SUCCINATE 50 MG/1
50 TABLET, EXTENDED RELEASE ORAL DAILY
Qty: 90 TABLET | Refills: 1 | Status: SHIPPED | OUTPATIENT
Start: 2019-04-01 | End: 2019-06-13 | Stop reason: SDUPTHER

## 2019-04-12 ENCOUNTER — TELEPHONE (OUTPATIENT)
Dept: INTERNAL MEDICINE CLINIC | Age: 84
End: 2019-04-12

## 2019-04-12 RX ORDER — BENZONATATE 100 MG/1
100 CAPSULE ORAL 3 TIMES DAILY PRN
Qty: 30 CAPSULE | Refills: 0 | Status: SHIPPED | OUTPATIENT
Start: 2019-04-12 | End: 2019-11-05 | Stop reason: SDUPTHER

## 2019-04-19 ENCOUNTER — TELEPHONE (OUTPATIENT)
Dept: INTERNAL MEDICINE CLINIC | Age: 84
End: 2019-04-19

## 2019-04-19 DIAGNOSIS — R39.9 UTI SYMPTOMS: Primary | ICD-10-CM

## 2019-05-08 RX ORDER — CEPHALEXIN 250 MG/1
250 CAPSULE ORAL DAILY
Qty: 90 CAPSULE | Refills: 0 | Status: SHIPPED | OUTPATIENT
Start: 2019-05-08 | End: 2019-08-09 | Stop reason: SDUPTHER

## 2019-05-10 ENCOUNTER — TELEPHONE (OUTPATIENT)
Dept: INTERNAL MEDICINE CLINIC | Age: 84
End: 2019-05-10

## 2019-05-10 DIAGNOSIS — R39.9 UTI SYMPTOMS: Primary | ICD-10-CM

## 2019-05-10 NOTE — TELEPHONE ENCOUNTER
----- Message from Jose Trevino MD sent at 5/10/2019  1:15 PM EDT -----  Contact: Jordy Wilson (her son) [phone]  [address] Kalie Mcclain    ----- Message -----  From: Helder Chang  Sent: 5/10/2019  10:18 AM  To: Jose Trevino MD    Pt's son is wondering if he could drop off a urine sample he has from his mother. He believes she has a UTI. He doesn't want to bring her in for one because its hard to get her out of bed and bring her to the hospital, so he would like to just drop one off. Could we let quique know at [phone]. Next jailyn 6/13. Last seen 3/12.

## 2019-05-12 LAB — URINE CULTURE, ROUTINE: NORMAL

## 2019-06-13 ENCOUNTER — OFFICE VISIT (OUTPATIENT)
Dept: INTERNAL MEDICINE CLINIC | Age: 84
End: 2019-06-13

## 2019-06-13 VITALS
RESPIRATION RATE: 18 BRPM | DIASTOLIC BLOOD PRESSURE: 75 MMHG | SYSTOLIC BLOOD PRESSURE: 125 MMHG | HEART RATE: 70 BPM | BODY MASS INDEX: 53.13 KG/M2 | HEIGHT: 60 IN

## 2019-06-13 DIAGNOSIS — G81.90 HEMIPLEGIA AFFECTING NONDOMINANT SIDE (HCC): Chronic | ICD-10-CM

## 2019-06-13 DIAGNOSIS — I50.32 CHF (CONGESTIVE HEART FAILURE), NYHA CLASS I, CHRONIC, DIASTOLIC (HCC): ICD-10-CM

## 2019-06-13 DIAGNOSIS — E11.9 TYPE 2 DIABETES MELLITUS WITHOUT COMPLICATION, WITHOUT LONG-TERM CURRENT USE OF INSULIN (HCC): Chronic | ICD-10-CM

## 2019-06-13 DIAGNOSIS — F02.818 LATE ONSET ALZHEIMER'S DISEASE WITH BEHAVIORAL DISTURBANCE (HCC): ICD-10-CM

## 2019-06-13 DIAGNOSIS — G30.1 LATE ONSET ALZHEIMER'S DISEASE WITH BEHAVIORAL DISTURBANCE (HCC): ICD-10-CM

## 2019-06-13 DIAGNOSIS — I10 ESSENTIAL HYPERTENSION: Primary | Chronic | ICD-10-CM

## 2019-06-13 DIAGNOSIS — E66.01 MORBID OBESITY WITH BMI OF 50.0-59.9, ADULT (HCC): Chronic | ICD-10-CM

## 2019-06-13 DIAGNOSIS — F32.9 REACTIVE DEPRESSION: Chronic | ICD-10-CM

## 2019-06-13 DIAGNOSIS — I48.0 PAROXYSMAL ATRIAL FIBRILLATION (HCC): Chronic | ICD-10-CM

## 2019-06-13 PROBLEM — R06.02 SOB (SHORTNESS OF BREATH): Status: RESOLVED | Noted: 2018-08-26 | Resolved: 2019-06-13

## 2019-06-13 PROBLEM — Z79.01 ANTICOAGULATED: Chronic | Status: RESOLVED | Noted: 2018-08-26 | Resolved: 2019-06-13

## 2019-06-13 PROBLEM — R10.9 ABDOMINAL PAIN: Status: RESOLVED | Noted: 2018-08-26 | Resolved: 2019-06-13

## 2019-06-13 PROBLEM — R60.9 INTERSTITIAL EDEMA: Status: RESOLVED | Noted: 2018-08-26 | Resolved: 2019-06-13

## 2019-06-13 PROBLEM — R94.31 QT PROLONGATION: Status: RESOLVED | Noted: 2018-08-26 | Resolved: 2019-06-13

## 2019-06-13 LAB
A/G RATIO: 1.5 (ref 1.1–2.2)
ALBUMIN SERPL-MCNC: 4.5 G/DL (ref 3.4–5)
ALP BLD-CCNC: 91 U/L (ref 40–129)
ALT SERPL-CCNC: 25 U/L (ref 10–40)
ANION GAP SERPL CALCULATED.3IONS-SCNC: 20 MMOL/L (ref 3–16)
AST SERPL-CCNC: 41 U/L (ref 15–37)
BASOPHILS ABSOLUTE: 0.1 K/UL (ref 0–0.2)
BASOPHILS RELATIVE PERCENT: 0.8 %
BILIRUB SERPL-MCNC: 0.5 MG/DL (ref 0–1)
BUN BLDV-MCNC: 17 MG/DL (ref 7–20)
CALCIUM SERPL-MCNC: 10.1 MG/DL (ref 8.3–10.6)
CHLORIDE BLD-SCNC: 97 MMOL/L (ref 99–110)
CO2: 25 MMOL/L (ref 21–32)
CREAT SERPL-MCNC: 0.8 MG/DL (ref 0.6–1.2)
EOSINOPHILS ABSOLUTE: 0.4 K/UL (ref 0–0.6)
EOSINOPHILS RELATIVE PERCENT: 5.7 %
GFR AFRICAN AMERICAN: >60
GFR NON-AFRICAN AMERICAN: >60
GLOBULIN: 3 G/DL
GLUCOSE BLD-MCNC: 173 MG/DL (ref 70–99)
HCT VFR BLD CALC: 43.6 % (ref 36–48)
HEMOGLOBIN: 14.8 G/DL (ref 12–16)
LYMPHOCYTES ABSOLUTE: 2.4 K/UL (ref 1–5.1)
LYMPHOCYTES RELATIVE PERCENT: 34.8 %
MCH RBC QN AUTO: 31 PG (ref 26–34)
MCHC RBC AUTO-ENTMCNC: 33.9 G/DL (ref 31–36)
MCV RBC AUTO: 91.5 FL (ref 80–100)
MONOCYTES ABSOLUTE: 0.5 K/UL (ref 0–1.3)
MONOCYTES RELATIVE PERCENT: 7.9 %
NEUTROPHILS ABSOLUTE: 3.4 K/UL (ref 1.7–7.7)
NEUTROPHILS RELATIVE PERCENT: 50.8 %
PDW BLD-RTO: 14 % (ref 12.4–15.4)
PLATELET # BLD: 307 K/UL (ref 135–450)
PMV BLD AUTO: 7.8 FL (ref 5–10.5)
POTASSIUM SERPL-SCNC: 4 MMOL/L (ref 3.5–5.1)
RBC # BLD: 4.77 M/UL (ref 4–5.2)
SODIUM BLD-SCNC: 142 MMOL/L (ref 136–145)
TOTAL PROTEIN: 7.5 G/DL (ref 6.4–8.2)
WBC # BLD: 6.8 K/UL (ref 4–11)

## 2019-06-13 PROCEDURE — G8428 CUR MEDS NOT DOCUMENT: HCPCS | Performed by: INTERNAL MEDICINE

## 2019-06-13 PROCEDURE — 99214 OFFICE O/P EST MOD 30 MIN: CPT | Performed by: INTERNAL MEDICINE

## 2019-06-13 PROCEDURE — 1036F TOBACCO NON-USER: CPT | Performed by: INTERNAL MEDICINE

## 2019-06-13 PROCEDURE — G8599 NO ASA/ANTIPLAT THER USE RNG: HCPCS | Performed by: INTERNAL MEDICINE

## 2019-06-13 PROCEDURE — G8400 PT W/DXA NO RESULTS DOC: HCPCS | Performed by: INTERNAL MEDICINE

## 2019-06-13 PROCEDURE — G8417 CALC BMI ABV UP PARAM F/U: HCPCS | Performed by: INTERNAL MEDICINE

## 2019-06-13 PROCEDURE — 1123F ACP DISCUSS/DSCN MKR DOCD: CPT | Performed by: INTERNAL MEDICINE

## 2019-06-13 PROCEDURE — 4040F PNEUMOC VAC/ADMIN/RCVD: CPT | Performed by: INTERNAL MEDICINE

## 2019-06-13 PROCEDURE — 1090F PRES/ABSN URINE INCON ASSESS: CPT | Performed by: INTERNAL MEDICINE

## 2019-06-13 RX ORDER — FUROSEMIDE 40 MG/1
40 TABLET ORAL DAILY
Qty: 90 TABLET | Refills: 0 | Status: SHIPPED | OUTPATIENT
Start: 2019-06-13 | End: 2019-10-31 | Stop reason: SDUPTHER

## 2019-06-13 RX ORDER — SPIRONOLACTONE AND HYDROCHLOROTHIAZIDE 25; 25 MG/1; MG/1
1 TABLET ORAL DAILY
Qty: 90 TABLET | Refills: 0 | Status: SHIPPED | OUTPATIENT
Start: 2019-06-13 | End: 2020-01-23 | Stop reason: SDUPTHER

## 2019-06-13 RX ORDER — ESCITALOPRAM OXALATE 20 MG/1
20 TABLET ORAL DAILY
Qty: 90 TABLET | Refills: 0 | Status: SHIPPED | OUTPATIENT
Start: 2019-06-13 | End: 2019-10-31 | Stop reason: SDUPTHER

## 2019-06-13 RX ORDER — DIGOXIN 125 MCG
125 TABLET ORAL DAILY
Qty: 90 TABLET | Refills: 0 | Status: SHIPPED | OUTPATIENT
Start: 2019-06-13 | End: 2020-09-17 | Stop reason: SDUPTHER

## 2019-06-13 RX ORDER — OMEPRAZOLE 40 MG/1
40 CAPSULE, DELAYED RELEASE ORAL DAILY
Qty: 90 CAPSULE | Refills: 0 | Status: SHIPPED | OUTPATIENT
Start: 2019-06-13 | End: 2019-09-15 | Stop reason: SDUPTHER

## 2019-06-13 RX ORDER — METOPROLOL SUCCINATE 50 MG/1
50 TABLET, EXTENDED RELEASE ORAL DAILY
Qty: 90 TABLET | Refills: 1 | Status: SHIPPED | OUTPATIENT
Start: 2019-06-13 | End: 2020-01-17

## 2019-06-13 RX ORDER — FESOTERODINE FUMARATE 8 MG/1
1 TABLET, EXTENDED RELEASE ORAL DAILY
Qty: 90 TABLET | Refills: 0 | Status: SHIPPED | OUTPATIENT
Start: 2019-06-13 | End: 2019-09-18 | Stop reason: SDUPTHER

## 2019-06-13 RX ORDER — LOSARTAN POTASSIUM 50 MG/1
50 TABLET ORAL DAILY
Qty: 90 TABLET | Refills: 0 | Status: ON HOLD | OUTPATIENT
Start: 2019-06-13 | End: 2020-01-21 | Stop reason: SDUPTHER

## 2019-06-13 RX ORDER — QUETIAPINE FUMARATE 50 MG/1
50 TABLET, FILM COATED ORAL NIGHTLY
Qty: 90 TABLET | Refills: 0 | Status: SHIPPED | OUTPATIENT
Start: 2019-06-13 | End: 2019-10-31 | Stop reason: SDUPTHER
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
## 2019-06-14 LAB
DIGOXIN LEVEL: 1 NG/ML (ref 0.8–2)
ESTIMATED AVERAGE GLUCOSE: 159.9 MG/DL
HBA1C MFR BLD: 7.2 %

## 2019-06-18 RX ORDER — TOLTERODINE 2 MG/1
2 CAPSULE, EXTENDED RELEASE ORAL DAILY
Qty: 30 CAPSULE | Refills: 1 | Status: SHIPPED | OUTPATIENT
Start: 2019-06-18 | End: 2019-09-18 | Stop reason: SDUPTHER

## 2019-06-28 ENCOUNTER — TELEPHONE (OUTPATIENT)
Dept: INTERNAL MEDICINE CLINIC | Age: 84
End: 2019-06-28

## 2019-06-28 NOTE — TELEPHONE ENCOUNTER
I contacted pts son and let him know that Megha Keita is the prescribing doctor. I told him to contact their office and see if they have samples to get her by. I told him to also discuss pt assistance with them. Pts son said that they have filled out forms for this previously. I told him to contact them and to contact us if he needs us to do anything.     ----- Message from Kirsten Ponce sent at 6/28/2019  2:59 PM EDT -----  Contact: pt's son Shantel Albarado called [phone]  The pt's heart doctor prescribed her pradaxa, she is now currently out and needs a refill but this medication is $600 for them. Dieudonne Carreon is trying to get an application from the pharmaceutical company that would need filled out by them and you to get this medication down to a lower price for them. This application process could take a couple weeks and she is out now. I informed Shantel Albarado that you would be back Monday and he is going to call his insurance company to see what kind of blood thinners they cover. He is going to call us back today and let us know so we can see if another doctor can call them in a 2 week supply until they can get the pradaxa cheaper. Pt's phone [phone]. Next jailyn 9/12. Dieudonne Carreon and the family are pretty confused on the whole process of this application. If you know any information that could help could you let quique know.

## 2019-07-01 ENCOUNTER — TELEPHONE (OUTPATIENT)
Dept: INTERNAL MEDICINE CLINIC | Age: 84
End: 2019-07-01

## 2019-07-01 NOTE — TELEPHONE ENCOUNTER
----- Message from Alma Vinson, [facility] [place] sent at 7/1/2019  1:16 PM EDT -----  Contact: pt's son Ethel Spatz called [phone]      ----- Message -----  From: Cornelia Carter MD  Sent: 7/1/2019  11:54 AM  To: Stefany Roth MA    Ok to refill  pradaxa bid as before  ----- Message -----  From: Dany Jannette  Sent: 6/28/2019   2:59 PM  To: Cornelia Carter MD    The pt's heart doctor prescribed her pradaxa, she is now currently out and needs a refill but this medication is $600 for them. Rosie Medina is trying to get an application from the pharmaceutical company that would need filled out by them and you to get this medication down to a lower price for them. This application process could take a couple weeks and she is out now. I informed Ethel Spatz that you would be back Monday and he is going to call his insurance company to see what kind of blood thinners they cover. He is going to call us back today and let us know so we can see if another doctor can call them in a 2 week supply until they can get the pradaxa cheaper. Pt's phone [phone]. Next jailyn 9/12. Rosie Medina and the family are pretty confused on the whole process of this application. If you know any information that could help could you let quique know.

## 2019-07-02 ENCOUNTER — TELEPHONE (OUTPATIENT)
Dept: INTERNAL MEDICINE CLINIC | Age: 84
End: 2019-07-02

## 2019-07-02 NOTE — TELEPHONE ENCOUNTER
----- Message from Luis Daniel Narayanan MD sent at 7/2/2019 11:37 AM EDT -----  Contact: pt's son Noa Stover called [phone]  No alternative as xarelto and eliquis not indicated for hx of stroke  Let her Call her cardiology    ----- Message -----  From: Caleb Morris  Sent: 7/2/2019   9:59 AM  To: Luis Daniel Narayanan MD    Pt is applying for patient assistance for the pradaxa. Son wanting to know if you can call in an alternative med until the Patient Assistance comes back as pt cannot afford pradaxa right now. Please advise.  ----- Message -----  From: Jennifer Mtz MA  Sent: 7/1/2019   1:16 PM  To: Caleb Morris        ----- Message -----  From: Luis Daniel Narayanan MD  Sent: 7/1/2019  11:54 AM  To: Sergio Molina MA    Ok to refill  pradaxa bid as before  ----- Message -----  From: Meghna Hewitt  Sent: 6/28/2019   2:59 PM  To: Luis Daniel Narayanan MD    The pt's heart doctor prescribed her pradaxa, she is now currently out and needs a refill but this medication is $600 for them. Joanie Medrano is trying to get an application from the pharmaceutical company that would need filled out by them and you to get this medication down to a lower price for them. This application process could take a couple weeks and she is out now. I informed Noa Stover that you would be back Monday and he is going to call his insurance company to see what kind of blood thinners they cover. He is going to call us back today and let us know so we can see if another doctor can call them in a 2 week supply until they can get the pradaxa cheaper. Pt's phone [phone]. Next jailyn 9/12. Joaniemarty Medrano and the family are pretty confused on the whole process of this application. If you know any information that could help could you let quique know.

## 2019-07-05 ENCOUNTER — TELEPHONE (OUTPATIENT)
Dept: INTERNAL MEDICINE CLINIC | Age: 84
End: 2019-07-05

## 2019-07-05 NOTE — TELEPHONE ENCOUNTER
----- Message from Aubrie Hoffman MD sent at 7/5/2019 12:58 PM EDT -----  Contact: pt's family member came in [phone] Kalie Mcclain   ----- Message -----  From: [address] [place] [address]: 7/5/2019  12:26 PM  To: Aubrie Hoffman MD    Pt is requesting a handicap sticker prescription. The pt is just going to come pick it up when its ready. Could you call them when its finished at [phone].

## 2019-08-09 RX ORDER — CEPHALEXIN 250 MG/1
CAPSULE ORAL
Qty: 90 CAPSULE | Refills: 0 | Status: SHIPPED | OUTPATIENT
Start: 2019-08-09 | End: 2019-11-03 | Stop reason: SDUPTHER

## 2019-09-12 ENCOUNTER — OFFICE VISIT (OUTPATIENT)
Dept: INTERNAL MEDICINE CLINIC | Age: 84
End: 2019-09-12

## 2019-09-12 DIAGNOSIS — E11.9 TYPE 2 DIABETES MELLITUS WITHOUT COMPLICATION, WITHOUT LONG-TERM CURRENT USE OF INSULIN (HCC): Chronic | ICD-10-CM

## 2019-09-12 DIAGNOSIS — Z00.00 ROUTINE GENERAL MEDICAL EXAMINATION AT A HEALTH CARE FACILITY: ICD-10-CM

## 2019-09-12 DIAGNOSIS — G81.90 HEMIPLEGIA AFFECTING NONDOMINANT SIDE (HCC): Chronic | ICD-10-CM

## 2019-09-12 DIAGNOSIS — Z71.89 ACP (ADVANCE CARE PLANNING): ICD-10-CM

## 2019-09-12 DIAGNOSIS — Z00.00 MEDICARE ANNUAL WELLNESS VISIT, INITIAL: ICD-10-CM

## 2019-09-12 DIAGNOSIS — I10 ESSENTIAL HYPERTENSION: Chronic | ICD-10-CM

## 2019-09-12 DIAGNOSIS — Z00.00 MEDICARE ANNUAL WELLNESS VISIT, INITIAL: Primary | ICD-10-CM

## 2019-09-12 DIAGNOSIS — I50.32 CHF (CONGESTIVE HEART FAILURE), NYHA CLASS I, CHRONIC, DIASTOLIC (HCC): ICD-10-CM

## 2019-09-12 DIAGNOSIS — E78.2 MIXED HYPERLIPIDEMIA: Chronic | ICD-10-CM

## 2019-09-12 DIAGNOSIS — I25.10 CORONARY ARTERY DISEASE INVOLVING NATIVE CORONARY ARTERY OF NATIVE HEART WITHOUT ANGINA PECTORIS: Chronic | ICD-10-CM

## 2019-09-12 DIAGNOSIS — I48.0 PAROXYSMAL ATRIAL FIBRILLATION (HCC): Chronic | ICD-10-CM

## 2019-09-12 LAB
A/G RATIO: 1.4 (ref 1.1–2.2)
ALBUMIN SERPL-MCNC: 4.3 G/DL (ref 3.4–5)
ALP BLD-CCNC: 99 U/L (ref 40–129)
ALT SERPL-CCNC: 31 U/L (ref 10–40)
ANION GAP SERPL CALCULATED.3IONS-SCNC: 20 MMOL/L (ref 3–16)
AST SERPL-CCNC: 41 U/L (ref 15–37)
BILIRUB SERPL-MCNC: 0.5 MG/DL (ref 0–1)
BILIRUBIN, POC: NORMAL
BLOOD URINE, POC: NORMAL
BUN BLDV-MCNC: 35 MG/DL (ref 7–20)
CALCIUM SERPL-MCNC: 9.6 MG/DL (ref 8.3–10.6)
CHLORIDE BLD-SCNC: 95 MMOL/L (ref 99–110)
CLARITY, POC: NORMAL
CO2: 24 MMOL/L (ref 21–32)
COLOR, POC: NORMAL
CREAT SERPL-MCNC: 1 MG/DL (ref 0.6–1.2)
DIGOXIN LEVEL: 2 NG/ML (ref 0.8–2)
GFR AFRICAN AMERICAN: >60
GFR NON-AFRICAN AMERICAN: 53
GLOBULIN: 3 G/DL
GLUCOSE BLD-MCNC: 188 MG/DL (ref 70–99)
GLUCOSE URINE, POC: NORMAL
KETONES, POC: NORMAL
LEUKOCYTE EST, POC: NORMAL
NITRITE, POC: NORMAL
PH, POC: NORMAL
POTASSIUM SERPL-SCNC: 4.1 MMOL/L (ref 3.5–5.1)
PROTEIN, POC: NORMAL
SODIUM BLD-SCNC: 139 MMOL/L (ref 136–145)
SPECIFIC GRAVITY, POC: NORMAL
TOTAL PROTEIN: 7.3 G/DL (ref 6.4–8.2)
TSH REFLEX: 3.03 UIU/ML (ref 0.27–4.2)
UROBILINOGEN, POC: NORMAL

## 2019-09-12 PROCEDURE — 1123F ACP DISCUSS/DSCN MKR DOCD: CPT | Performed by: INTERNAL MEDICINE

## 2019-09-12 PROCEDURE — G0438 PPPS, INITIAL VISIT: HCPCS | Performed by: INTERNAL MEDICINE

## 2019-09-12 PROCEDURE — G8599 NO ASA/ANTIPLAT THER USE RNG: HCPCS | Performed by: INTERNAL MEDICINE

## 2019-09-12 PROCEDURE — 4040F PNEUMOC VAC/ADMIN/RCVD: CPT | Performed by: INTERNAL MEDICINE

## 2019-09-12 PROCEDURE — 81002 URINALYSIS NONAUTO W/O SCOPE: CPT | Performed by: INTERNAL MEDICINE

## 2019-09-12 ASSESSMENT — PATIENT HEALTH QUESTIONNAIRE - PHQ9
2. FEELING DOWN, DEPRESSED OR HOPELESS: 1
SUM OF ALL RESPONSES TO PHQ QUESTIONS 1-9: 1
SUM OF ALL RESPONSES TO PHQ QUESTIONS 1-9: 1
SUM OF ALL RESPONSES TO PHQ9 QUESTIONS 1 & 2: 1
SUM OF ALL RESPONSES TO PHQ QUESTIONS 1-9: 2
1. LITTLE INTEREST OR PLEASURE IN DOING THINGS: 0
SUM OF ALL RESPONSES TO PHQ QUESTIONS 1-9: 2

## 2019-09-12 ASSESSMENT — LIFESTYLE VARIABLES: HOW OFTEN DO YOU HAVE A DRINK CONTAINING ALCOHOL: 0

## 2019-09-12 NOTE — PROGRESS NOTES
Rosas Sexton MD   Fesoterodine Fumarate ER 8 MG TB24 Take 1 tablet by mouth daily  Benjamin Lazcano MD   losartan (COZAAR) 50 MG tablet Take 1 tablet by mouth daily  Benjamin Lazcano MD   omeprazole (PRILOSEC) 40 MG delayed release capsule Take 1 capsule by mouth daily  Benjamin Lazcano MD   simvastatin (ZOCOR) 20 MG tablet Take 1 tablet by mouth nightly  Benjamin Lazcano MD   Multiple Vitamins-Minerals-FA (OCUVEL PO) Take 1 tablet by mouth daily  Historical Provider, MD   olopatadine (PATADAY) 0.2 % SOLN ophthalmic solution Apply 1 drop to eye daily Both eyes  Historical Provider, MD   niacin 500 MG extended release capsule Take 500 mg by mouth nightly  Historical Provider, MD   ammonium lactate (LAC-HYDRIN) 12 % lotion Apply topically daily. Patient taking differently: Apply topically as needed Apply topically daily. Benjamin Lazcano MD   Blood Glucose Monitoring Suppl ([address]) W/DEVICE KIT Patient tests once daily. DX:E11.9  Benjamin Lazcano MD   [id number] Patient tests once daily. DX:E11.9  Benjamin Lazcano MD   glucose blood VI test strips (ONE TOUCH TEST STRIPS) strip Patient tests once daily.   DX: E11.9  Benjamin Lazcano MD   250 MG capsule TAKE 1 CAPSULE DAILY  Benjamin Lazcano MD   tolterodine (DETROL LA) 2 MG extended release capsule Take 1 capsule by mouth daily  Benjamin Lazcano MD   QUEtiapine (SEROQUEL) 50 MG tablet Take 1 tablet by mouth nightly  Benjamin Lazcano MD   escitalopram (LEXAPRO) 20 MG tablet Take 1 tablet by mouth daily  Benjamin Lazcano MD   spironolactone-hydrochlorothiazide (ALDACTAZIDE) 25-25 MG per tablet Take 1 tablet by mouth daily  Benjamin Lazcano MD   metFORMIN (GLUCOPHAGE) 500 MG tablet Take 1 tablet by mouth daily (with breakfast)  Benjamin Lazcano MD   digoxin (LANOXIN) 125 MCG tablet Take 1 tablet by mouth daily  Benjamin Lazcano MD   metoprolol succinate (TOPROL XL) 50 MG extended mobility   consistent with aortic stenosis.   Mild aortic stenosis.   Mitral annular calcification is present.   Mild to moderate mitral regurgitation.   Moderate tricuspid regurgitation.   Systolic pulmonary artery pressure (SPAP) estimated at 40 mmHg (right atrial   pressure 3 mmHg), consistent with mild pulmonary hypertension      Assessment:        Diagnosis Orders      Diagnosis Orders   1. Medicare annual wellness visit, initial  HEMOGLOBIN A1C    Digoxin level    TSH with Reflex    COMPREHENSIVE METABOLIC PANEL   2. Coronary artery disease involving native coronary artery of native heart without angina pectoris     3. CHF (congestive heart failure), NYHA class I, chronic, diastolic (HCC)     4. Paroxysmal atrial fibrillation (HCC)  Digoxin level   5. Mixed hyperlipidemia     6. Hemiplegia affecting nondominant side s/p CVA     7. Essential hypertension     8. Type 2 diabetes mellitus without complication, without long-term current use of insulin (HCC)  HEMOGLOBIN A1C    Digoxin level    TSH with Reflex    COMPREHENSIVE METABOLIC PANEL   9. ACP (advance care planning)     10. Routine general medical examination at a health care facility  POCT Urinalysis no Micro           Plan:         Afibb - rate controlled - on digoxin, b blocker. Off  coumadin ,   now on pradaxa by Dr. Reid Hurst ( 3/17)- recently ran out and given eliquis temporarily  Would be cautious with ELiquis with hx of brain bleed   stable digoxin levels    HTN - stable on metorprolol, aldactazide, Lasix     Dm- 2 -well controlled. stable on metformin , last  A1c at 7. Home sugars stable     Diastolic CHF -well compensated -now on lasix 40 mg daily and aldactone . Monitor K levels with ARB    tolerating well. . Low salt diet advised       CVA -with left sided hemiparesis  thought to be embolic -from Afibb.    on pradaxa , Stable  No bleeding issues      Urinary incontinence - on toviaz    Recent recurrent UTI - on abx as needed    Depression -on

## 2019-09-13 LAB
ESTIMATED AVERAGE GLUCOSE: 157.1 MG/DL
HBA1C MFR BLD: 7.1 %

## 2019-09-16 RX ORDER — OMEPRAZOLE 40 MG/1
CAPSULE, DELAYED RELEASE ORAL
Qty: 90 CAPSULE | Refills: 0 | Status: SHIPPED | OUTPATIENT
Start: 2019-09-16 | End: 2019-10-31 | Stop reason: SDUPTHER

## 2019-09-16 RX ORDER — SIMVASTATIN 20 MG
TABLET ORAL
Qty: 90 TABLET | Refills: 0 | Status: SHIPPED | OUTPATIENT
Start: 2019-09-16 | End: 2019-12-04 | Stop reason: SDUPTHER

## 2019-09-18 RX ORDER — TOLTERODINE 2 MG/1
CAPSULE, EXTENDED RELEASE ORAL
Qty: 30 CAPSULE | Refills: 1 | Status: SHIPPED | OUTPATIENT
Start: 2019-09-18 | End: 2019-11-03 | Stop reason: SDUPTHER

## 2019-10-31 DIAGNOSIS — G30.1 LATE ONSET ALZHEIMER'S DISEASE WITH BEHAVIORAL DISTURBANCE (HCC): ICD-10-CM

## 2019-10-31 DIAGNOSIS — F02.818 LATE ONSET ALZHEIMER'S DISEASE WITH BEHAVIORAL DISTURBANCE (HCC): ICD-10-CM

## 2019-10-31 RX ORDER — OMEPRAZOLE 40 MG/1
CAPSULE, DELAYED RELEASE ORAL
Qty: 90 CAPSULE | Refills: 0 | Status: SHIPPED | OUTPATIENT
Start: 2019-10-31 | End: 2019-11-04 | Stop reason: SDUPTHER

## 2019-10-31 RX ORDER — ESCITALOPRAM OXALATE 20 MG/1
20 TABLET ORAL DAILY
Qty: 90 TABLET | Refills: 0 | Status: SHIPPED | OUTPATIENT
Start: 2019-10-31 | End: 2020-01-17

## 2019-10-31 RX ORDER — QUETIAPINE FUMARATE 50 MG/1
50 TABLET, FILM COATED ORAL NIGHTLY
Qty: 90 TABLET | Refills: 0 | Status: SHIPPED | OUTPATIENT
Start: 2019-10-31 | End: 2019-11-04 | Stop reason: SDUPTHER

## 2019-10-31 RX ORDER — FUROSEMIDE 40 MG/1
40 TABLET ORAL DAILY
Qty: 90 TABLET | Refills: 0 | Status: SHIPPED | OUTPATIENT
Start: 2019-10-31 | End: 2020-01-17

## 2019-11-04 ENCOUNTER — TELEPHONE (OUTPATIENT)
Dept: INTERNAL MEDICINE CLINIC | Age: 84
End: 2019-11-04

## 2019-11-04 DIAGNOSIS — F02.818 LATE ONSET ALZHEIMER'S DISEASE WITH BEHAVIORAL DISTURBANCE (HCC): ICD-10-CM

## 2019-11-04 DIAGNOSIS — G30.1 LATE ONSET ALZHEIMER'S DISEASE WITH BEHAVIORAL DISTURBANCE (HCC): ICD-10-CM

## 2019-11-04 RX ORDER — TOLTERODINE 2 MG/1
CAPSULE, EXTENDED RELEASE ORAL
Qty: 90 CAPSULE | Refills: 0 | Status: SHIPPED | OUTPATIENT
Start: 2019-11-04 | End: 2020-01-17

## 2019-11-04 RX ORDER — CEPHALEXIN 250 MG/1
CAPSULE ORAL
Qty: 90 CAPSULE | Refills: 0 | Status: ON HOLD | OUTPATIENT
Start: 2019-11-04 | End: 2020-01-21 | Stop reason: HOSPADM

## 2019-11-04 RX ORDER — QUETIAPINE FUMARATE 50 MG/1
50 TABLET, FILM COATED ORAL NIGHTLY
Qty: 90 TABLET | Refills: 0 | Status: SHIPPED | OUTPATIENT
Start: 2019-11-04 | End: 2020-06-25 | Stop reason: SDUPTHER

## 2019-11-04 RX ORDER — OMEPRAZOLE 40 MG/1
CAPSULE, DELAYED RELEASE ORAL
Qty: 90 CAPSULE | Refills: 0 | Status: SHIPPED | OUTPATIENT
Start: 2019-11-04 | End: 2020-02-28

## 2019-11-05 RX ORDER — BENZONATATE 100 MG/1
100 CAPSULE ORAL 3 TIMES DAILY PRN
Qty: 30 CAPSULE | Refills: 0 | Status: SHIPPED | OUTPATIENT
Start: 2019-11-05 | End: 2019-11-15

## 2019-12-04 RX ORDER — SIMVASTATIN 20 MG
TABLET ORAL
Qty: 90 TABLET | Refills: 0 | Status: SHIPPED | OUTPATIENT
Start: 2019-12-04 | End: 2020-02-21

## 2020-01-17 RX ORDER — ESCITALOPRAM OXALATE 20 MG/1
TABLET ORAL
Qty: 90 TABLET | Refills: 0 | Status: SHIPPED | OUTPATIENT
Start: 2020-01-17 | End: 2020-04-06

## 2020-01-17 RX ORDER — FUROSEMIDE 40 MG/1
TABLET ORAL
Qty: 90 TABLET | Refills: 0 | Status: ON HOLD | OUTPATIENT
Start: 2020-01-17 | End: 2020-01-21 | Stop reason: SDUPTHER

## 2020-01-17 RX ORDER — METOPROLOL SUCCINATE 50 MG/1
TABLET, EXTENDED RELEASE ORAL
Qty: 90 TABLET | Refills: 0 | Status: SHIPPED | OUTPATIENT
Start: 2020-01-17 | End: 2020-04-20

## 2020-01-17 RX ORDER — TOLTERODINE 2 MG/1
CAPSULE, EXTENDED RELEASE ORAL
Qty: 90 CAPSULE | Refills: 0 | Status: SHIPPED | OUTPATIENT
Start: 2020-01-17 | End: 2020-04-06

## 2020-01-19 ENCOUNTER — APPOINTMENT (OUTPATIENT)
Dept: GENERAL RADIOLOGY | Age: 85
DRG: 291 | End: 2020-01-19
Payer: MEDICARE

## 2020-01-19 ENCOUNTER — HOSPITAL ENCOUNTER (INPATIENT)
Age: 85
LOS: 2 days | Discharge: HOME | DRG: 291 | End: 2020-01-22
Attending: FAMILY MEDICINE | Admitting: INTERNAL MEDICINE
Payer: MEDICARE

## 2020-01-19 PROCEDURE — 94640 AIRWAY INHALATION TREATMENT: CPT

## 2020-01-19 PROCEDURE — 6370000000 HC RX 637 (ALT 250 FOR IP): Performed by: FAMILY MEDICINE

## 2020-01-19 PROCEDURE — 71045 X-RAY EXAM CHEST 1 VIEW: CPT

## 2020-01-19 PROCEDURE — 93005 ELECTROCARDIOGRAM TRACING: CPT | Performed by: FAMILY MEDICINE

## 2020-01-19 PROCEDURE — 99285 EMERGENCY DEPT VISIT HI MDM: CPT

## 2020-01-19 RX ORDER — IPRATROPIUM BROMIDE AND ALBUTEROL SULFATE 2.5; .5 MG/3ML; MG/3ML
1 SOLUTION RESPIRATORY (INHALATION) ONCE
Status: COMPLETED | OUTPATIENT
Start: 2020-01-19 | End: 2020-01-19

## 2020-01-19 RX ADMIN — IPRATROPIUM BROMIDE AND ALBUTEROL SULFATE 1 AMPULE: .5; 3 SOLUTION RESPIRATORY (INHALATION) at 23:35

## 2020-01-20 PROBLEM — I50.9 HEART FAILURE (HCC): Status: ACTIVE | Noted: 2020-01-20

## 2020-01-20 PROBLEM — I50.33 ACUTE ON CHRONIC DIASTOLIC CONGESTIVE HEART FAILURE (HCC): Status: ACTIVE | Noted: 2018-09-06

## 2020-01-20 LAB
A/G RATIO: 1.1 (ref 1.1–2.2)
ALBUMIN SERPL-MCNC: 3.6 G/DL (ref 3.4–5)
ALBUMIN SERPL-MCNC: 3.9 G/DL (ref 3.4–5)
ALP BLD-CCNC: 87 U/L (ref 40–129)
ALT SERPL-CCNC: 11 U/L (ref 10–40)
AMORPHOUS: ABNORMAL /HPF
ANION GAP SERPL CALCULATED.3IONS-SCNC: 12 MMOL/L (ref 3–16)
ANION GAP SERPL CALCULATED.3IONS-SCNC: 14 MMOL/L (ref 3–16)
AST SERPL-CCNC: 25 U/L (ref 15–37)
BACTERIA: ABNORMAL /HPF
BASOPHILS ABSOLUTE: 0 K/UL (ref 0–0.2)
BASOPHILS RELATIVE PERCENT: 0.3 %
BILIRUB SERPL-MCNC: 1.2 MG/DL (ref 0–1)
BILIRUBIN URINE: ABNORMAL
BLOOD, URINE: ABNORMAL
BUN BLDV-MCNC: 9 MG/DL (ref 7–20)
BUN BLDV-MCNC: 9 MG/DL (ref 7–20)
CALCIUM SERPL-MCNC: 9 MG/DL (ref 8.3–10.6)
CALCIUM SERPL-MCNC: 9.3 MG/DL (ref 8.3–10.6)
CHLORIDE BLD-SCNC: 97 MMOL/L (ref 99–110)
CHLORIDE BLD-SCNC: 98 MMOL/L (ref 99–110)
CLARITY: ABNORMAL
CO2: 28 MMOL/L (ref 21–32)
CO2: 28 MMOL/L (ref 21–32)
COLOR: YELLOW
CREAT SERPL-MCNC: 0.7 MG/DL (ref 0.6–1.2)
CREAT SERPL-MCNC: 0.7 MG/DL (ref 0.6–1.2)
DIGOXIN LEVEL: 0.7 NG/ML (ref 0.8–2)
DIGOXIN LEVEL: 0.8 NG/ML (ref 0.8–2)
EKG ATRIAL RATE: 96 BPM
EKG DIAGNOSIS: NORMAL
EKG Q-T INTERVAL: 390 MS
EKG QRS DURATION: 116 MS
EKG QTC CALCULATION (BAZETT): 471 MS
EKG R AXIS: -47 DEGREES
EKG T AXIS: 123 DEGREES
EKG VENTRICULAR RATE: 88 BPM
EOSINOPHILS ABSOLUTE: 0.1 K/UL (ref 0–0.6)
EOSINOPHILS RELATIVE PERCENT: 1.4 %
EPITHELIAL CELLS, UA: ABNORMAL /HPF
ESTIMATED AVERAGE GLUCOSE: 137 MG/DL
GFR AFRICAN AMERICAN: >60
GFR AFRICAN AMERICAN: >60
GFR NON-AFRICAN AMERICAN: >60
GFR NON-AFRICAN AMERICAN: >60
GLOBULIN: 3.3 G/DL
GLUCOSE BLD-MCNC: 116 MG/DL (ref 70–99)
GLUCOSE BLD-MCNC: 132 MG/DL (ref 70–99)
GLUCOSE BLD-MCNC: 136 MG/DL (ref 70–99)
GLUCOSE BLD-MCNC: 141 MG/DL (ref 70–99)
GLUCOSE BLD-MCNC: 144 MG/DL (ref 70–99)
GLUCOSE BLD-MCNC: 166 MG/DL (ref 70–99)
GLUCOSE URINE: NEGATIVE MG/DL
HBA1C MFR BLD: 6.4 %
HCT VFR BLD CALC: 37.4 % (ref 36–48)
HEMOGLOBIN: 12.2 G/DL (ref 12–16)
KETONES, URINE: NEGATIVE MG/DL
LACTIC ACID: 1.6 MMOL/L (ref 0.4–2)
LEUKOCYTE ESTERASE, URINE: ABNORMAL
LYMPHOCYTES ABSOLUTE: 1.6 K/UL (ref 1–5.1)
LYMPHOCYTES RELATIVE PERCENT: 18.2 %
MCH RBC QN AUTO: 30.5 PG (ref 26–34)
MCHC RBC AUTO-ENTMCNC: 32.7 G/DL (ref 31–36)
MCV RBC AUTO: 93 FL (ref 80–100)
MICROSCOPIC EXAMINATION: YES
MONOCYTES ABSOLUTE: 0.5 K/UL (ref 0–1.3)
MONOCYTES RELATIVE PERCENT: 6.2 %
MUCUS: ABNORMAL /LPF
NEUTROPHILS ABSOLUTE: 6.5 K/UL (ref 1.7–7.7)
NEUTROPHILS RELATIVE PERCENT: 73.9 %
NITRITE, URINE: NEGATIVE
PDW BLD-RTO: 13.8 % (ref 12.4–15.4)
PERFORMED ON: ABNORMAL
PH UA: 5.5 (ref 5–8)
PHOSPHORUS: 4 MG/DL (ref 2.5–4.9)
PLATELET # BLD: 274 K/UL (ref 135–450)
PMV BLD AUTO: 7.7 FL (ref 5–10.5)
POTASSIUM SERPL-SCNC: 3.1 MMOL/L (ref 3.5–5.1)
POTASSIUM SERPL-SCNC: 3.5 MMOL/L (ref 3.5–5.1)
PRO-BNP: 2019 PG/ML (ref 0–449)
PROTEIN UA: 100 MG/DL
RBC # BLD: 4.01 M/UL (ref 4–5.2)
RBC UA: ABNORMAL /HPF (ref 0–2)
SODIUM BLD-SCNC: 138 MMOL/L (ref 136–145)
SODIUM BLD-SCNC: 139 MMOL/L (ref 136–145)
SPECIFIC GRAVITY UA: >=1.03 (ref 1–1.03)
TOTAL PROTEIN: 6.9 G/DL (ref 6.4–8.2)
TROPONIN: <0.01 NG/ML
URINE TYPE: ABNORMAL
UROBILINOGEN, URINE: 0.2 E.U./DL
WBC # BLD: 8.7 K/UL (ref 4–11)
WBC UA: ABNORMAL /HPF (ref 0–5)
YEAST: PRESENT /HPF

## 2020-01-20 PROCEDURE — 81001 URINALYSIS AUTO W/SCOPE: CPT

## 2020-01-20 PROCEDURE — 2060000000 HC ICU INTERMEDIATE R&B

## 2020-01-20 PROCEDURE — 83605 ASSAY OF LACTIC ACID: CPT

## 2020-01-20 PROCEDURE — 96365 THER/PROPH/DIAG IV INF INIT: CPT

## 2020-01-20 PROCEDURE — 6360000002 HC RX W HCPCS: Performed by: HOSPITALIST

## 2020-01-20 PROCEDURE — 87086 URINE CULTURE/COLONY COUNT: CPT

## 2020-01-20 PROCEDURE — 99223 1ST HOSP IP/OBS HIGH 75: CPT | Performed by: INTERNAL MEDICINE

## 2020-01-20 PROCEDURE — 83880 ASSAY OF NATRIURETIC PEPTIDE: CPT

## 2020-01-20 PROCEDURE — 84484 ASSAY OF TROPONIN QUANT: CPT

## 2020-01-20 PROCEDURE — 36415 COLL VENOUS BLD VENIPUNCTURE: CPT

## 2020-01-20 PROCEDURE — 93010 ELECTROCARDIOGRAM REPORT: CPT | Performed by: INTERNAL MEDICINE

## 2020-01-20 PROCEDURE — 96375 TX/PRO/DX INJ NEW DRUG ADDON: CPT

## 2020-01-20 PROCEDURE — 2580000003 HC RX 258: Performed by: FAMILY MEDICINE

## 2020-01-20 PROCEDURE — 6360000002 HC RX W HCPCS: Performed by: FAMILY MEDICINE

## 2020-01-20 PROCEDURE — 2580000003 HC RX 258: Performed by: HOSPITALIST

## 2020-01-20 PROCEDURE — 6370000000 HC RX 637 (ALT 250 FOR IP): Performed by: HOSPITALIST

## 2020-01-20 PROCEDURE — 83036 HEMOGLOBIN GLYCOSYLATED A1C: CPT

## 2020-01-20 PROCEDURE — 80053 COMPREHEN METABOLIC PANEL: CPT

## 2020-01-20 PROCEDURE — 85025 COMPLETE CBC W/AUTO DIFF WBC: CPT

## 2020-01-20 PROCEDURE — 87040 BLOOD CULTURE FOR BACTERIA: CPT

## 2020-01-20 PROCEDURE — 80162 ASSAY OF DIGOXIN TOTAL: CPT

## 2020-01-20 PROCEDURE — 6370000000 HC RX 637 (ALT 250 FOR IP): Performed by: FAMILY MEDICINE

## 2020-01-20 RX ORDER — DIGOXIN 125 MCG
125 TABLET ORAL DAILY
Status: DISCONTINUED | OUTPATIENT
Start: 2020-01-20 | End: 2020-01-22 | Stop reason: HOSPADM

## 2020-01-20 RX ORDER — KETOTIFEN FUMARATE 0.35 MG/ML
1 SOLUTION/ DROPS OPHTHALMIC DAILY
Status: DISCONTINUED | OUTPATIENT
Start: 2020-01-20 | End: 2020-01-22 | Stop reason: HOSPADM

## 2020-01-20 RX ORDER — ESCITALOPRAM OXALATE 10 MG/1
20 TABLET ORAL DAILY
Status: DISCONTINUED | OUTPATIENT
Start: 2020-01-20 | End: 2020-01-22 | Stop reason: HOSPADM

## 2020-01-20 RX ORDER — METOPROLOL SUCCINATE 50 MG/1
50 TABLET, EXTENDED RELEASE ORAL DAILY
Status: DISCONTINUED | OUTPATIENT
Start: 2020-01-20 | End: 2020-01-22 | Stop reason: HOSPADM

## 2020-01-20 RX ORDER — SODIUM CHLORIDE 0.9 % (FLUSH) 0.9 %
10 SYRINGE (ML) INJECTION PRN
Status: DISCONTINUED | OUTPATIENT
Start: 2020-01-20 | End: 2020-01-22 | Stop reason: HOSPADM

## 2020-01-20 RX ORDER — OLOPATADINE HYDROCHLORIDE 2 MG/ML
1 SOLUTION/ DROPS OPHTHALMIC DAILY
Status: DISCONTINUED | OUTPATIENT
Start: 2020-01-20 | End: 2020-01-20 | Stop reason: CLARIF

## 2020-01-20 RX ORDER — PANTOPRAZOLE SODIUM 40 MG/1
40 TABLET, DELAYED RELEASE ORAL
Status: DISCONTINUED | OUTPATIENT
Start: 2020-01-20 | End: 2020-01-22 | Stop reason: HOSPADM

## 2020-01-20 RX ORDER — FUROSEMIDE 10 MG/ML
40 INJECTION INTRAMUSCULAR; INTRAVENOUS ONCE
Status: COMPLETED | OUTPATIENT
Start: 2020-01-20 | End: 2020-01-20

## 2020-01-20 RX ORDER — DEXTROSE MONOHYDRATE 50 MG/ML
100 INJECTION, SOLUTION INTRAVENOUS PRN
Status: DISCONTINUED | OUTPATIENT
Start: 2020-01-20 | End: 2020-01-22 | Stop reason: HOSPADM

## 2020-01-20 RX ORDER — NICOTINE POLACRILEX 4 MG
15 LOZENGE BUCCAL PRN
Status: DISCONTINUED | OUTPATIENT
Start: 2020-01-20 | End: 2020-01-22 | Stop reason: HOSPADM

## 2020-01-20 RX ORDER — HYDROCHLOROTHIAZIDE 25 MG/1
25 TABLET ORAL DAILY
Status: DISCONTINUED | OUTPATIENT
Start: 2020-01-20 | End: 2020-01-22 | Stop reason: HOSPADM

## 2020-01-20 RX ORDER — NIACIN 500 MG/1
500 TABLET, EXTENDED RELEASE ORAL NIGHTLY
Status: DISCONTINUED | OUTPATIENT
Start: 2020-01-20 | End: 2020-01-22 | Stop reason: HOSPADM

## 2020-01-20 RX ORDER — QUETIAPINE FUMARATE 25 MG/1
50 TABLET, FILM COATED ORAL NIGHTLY
Status: DISCONTINUED | OUTPATIENT
Start: 2020-01-20 | End: 2020-01-22 | Stop reason: HOSPADM

## 2020-01-20 RX ORDER — ACETAMINOPHEN 325 MG/1
650 TABLET ORAL EVERY 4 HOURS PRN
Status: DISCONTINUED | OUTPATIENT
Start: 2020-01-20 | End: 2020-01-22 | Stop reason: HOSPADM

## 2020-01-20 RX ORDER — TROSPIUM CHLORIDE 20 MG/1
20 TABLET, FILM COATED ORAL
Status: DISCONTINUED | OUTPATIENT
Start: 2020-01-20 | End: 2020-01-22 | Stop reason: HOSPADM

## 2020-01-20 RX ORDER — NICOTINE 14MG/24HR
1 PATCH, TRANSDERMAL 24 HOURS TRANSDERMAL DAILY
COMMUNITY
End: 2021-05-04 | Stop reason: ALTCHOICE

## 2020-01-20 RX ORDER — POTASSIUM CHLORIDE 7.45 MG/ML
10 INJECTION INTRAVENOUS PRN
Status: DISCONTINUED | OUTPATIENT
Start: 2020-01-20 | End: 2020-01-22 | Stop reason: HOSPADM

## 2020-01-20 RX ORDER — SODIUM CHLORIDE 0.9 % (FLUSH) 0.9 %
10 SYRINGE (ML) INJECTION EVERY 12 HOURS SCHEDULED
Status: DISCONTINUED | OUTPATIENT
Start: 2020-01-20 | End: 2020-01-22 | Stop reason: HOSPADM

## 2020-01-20 RX ORDER — SIMVASTATIN 10 MG
20 TABLET ORAL NIGHTLY
Status: DISCONTINUED | OUTPATIENT
Start: 2020-01-20 | End: 2020-01-22 | Stop reason: HOSPADM

## 2020-01-20 RX ORDER — BUTALBITAL, ACETAMINOPHEN AND CAFFEINE 50; 325; 40 MG/1; MG/1; MG/1
1 TABLET ORAL EVERY 6 HOURS PRN
COMMUNITY
End: 2020-01-28 | Stop reason: ALTCHOICE

## 2020-01-20 RX ORDER — SPIRONOLACTONE AND HYDROCHLOROTHIAZIDE 25; 25 MG/1; MG/1
1 TABLET ORAL DAILY
Status: DISCONTINUED | OUTPATIENT
Start: 2020-01-20 | End: 2020-01-20

## 2020-01-20 RX ORDER — FUROSEMIDE 10 MG/ML
20 INJECTION INTRAMUSCULAR; INTRAVENOUS 2 TIMES DAILY
Status: DISCONTINUED | OUTPATIENT
Start: 2020-01-20 | End: 2020-01-22 | Stop reason: HOSPADM

## 2020-01-20 RX ORDER — SPIRONOLACTONE 25 MG/1
25 TABLET ORAL DAILY
Status: DISCONTINUED | OUTPATIENT
Start: 2020-01-20 | End: 2020-01-22 | Stop reason: HOSPADM

## 2020-01-20 RX ORDER — DABIGATRAN ETEXILATE 150 MG/1
150 CAPSULE, COATED PELLETS ORAL 2 TIMES DAILY
Status: DISCONTINUED | OUTPATIENT
Start: 2020-01-20 | End: 2020-01-21

## 2020-01-20 RX ORDER — M-VIT,TX,IRON,MINS/CALC/FOLIC 27MG-0.4MG
1 TABLET ORAL DAILY
COMMUNITY
End: 2021-05-04 | Stop reason: ALTCHOICE

## 2020-01-20 RX ORDER — DEXTROSE MONOHYDRATE 25 G/50ML
12.5 INJECTION, SOLUTION INTRAVENOUS PRN
Status: DISCONTINUED | OUTPATIENT
Start: 2020-01-20 | End: 2020-01-22 | Stop reason: HOSPADM

## 2020-01-20 RX ORDER — ONDANSETRON 2 MG/ML
4 INJECTION INTRAMUSCULAR; INTRAVENOUS EVERY 6 HOURS PRN
Status: DISCONTINUED | OUTPATIENT
Start: 2020-01-20 | End: 2020-01-22 | Stop reason: HOSPADM

## 2020-01-20 RX ORDER — POTASSIUM CHLORIDE 750 MG/1
40 TABLET, EXTENDED RELEASE ORAL PRN
Status: DISCONTINUED | OUTPATIENT
Start: 2020-01-20 | End: 2020-01-22 | Stop reason: HOSPADM

## 2020-01-20 RX ADMIN — FUROSEMIDE 40 MG: 10 INJECTION, SOLUTION INTRAMUSCULAR; INTRAVENOUS at 01:11

## 2020-01-20 RX ADMIN — Medication 10 ML: at 10:17

## 2020-01-20 RX ADMIN — SIMVASTATIN 20 MG: 10 TABLET, FILM COATED ORAL at 20:41

## 2020-01-20 RX ADMIN — Medication 10 ML: at 20:42

## 2020-01-20 RX ADMIN — QUETIAPINE FUMARATE 50 MG: 25 TABLET ORAL at 20:41

## 2020-01-20 RX ADMIN — TROSPIUM CHLORIDE 20 MG: 20 TABLET, FILM COATED ORAL at 16:02

## 2020-01-20 RX ADMIN — HYDROCHLOROTHIAZIDE 25 MG: 25 TABLET ORAL at 10:12

## 2020-01-20 RX ADMIN — METOPROLOL SUCCINATE 50 MG: 50 TABLET, EXTENDED RELEASE ORAL at 10:13

## 2020-01-20 RX ADMIN — INSULIN LISPRO 1 UNITS: 100 INJECTION, SOLUTION INTRAVENOUS; SUBCUTANEOUS at 20:47

## 2020-01-20 RX ADMIN — KETOTIFEN FUMARATE 1 DROP: 0.35 SOLUTION/ DROPS OPHTHALMIC at 16:02

## 2020-01-20 RX ADMIN — ASPIRIN 325 MG: 325 TABLET, DELAYED RELEASE ORAL at 10:13

## 2020-01-20 RX ADMIN — FUROSEMIDE 20 MG: 10 INJECTION, SOLUTION INTRAMUSCULAR; INTRAVENOUS at 10:17

## 2020-01-20 RX ADMIN — PANTOPRAZOLE SODIUM 40 MG: 40 TABLET, DELAYED RELEASE ORAL at 10:11

## 2020-01-20 RX ADMIN — DIGOXIN 125 MCG: 125 TABLET ORAL at 10:13

## 2020-01-20 RX ADMIN — TROSPIUM CHLORIDE 20 MG: 20 TABLET, FILM COATED ORAL at 10:13

## 2020-01-20 RX ADMIN — NITROGLYCERIN 1 INCH: 20 OINTMENT TOPICAL at 03:02

## 2020-01-20 RX ADMIN — SPIRONOLACTONE 25 MG: 25 TABLET ORAL at 10:11

## 2020-01-20 RX ADMIN — ESCITALOPRAM OXALATE 20 MG: 10 TABLET ORAL at 10:12

## 2020-01-20 RX ADMIN — FUROSEMIDE 20 MG: 10 INJECTION, SOLUTION INTRAMUSCULAR; INTRAVENOUS at 18:36

## 2020-01-20 RX ADMIN — INSULIN LISPRO 1 UNITS: 100 INJECTION, SOLUTION INTRAVENOUS; SUBCUTANEOUS at 12:31

## 2020-01-20 RX ADMIN — CEFTRIAXONE 1 G: 1 INJECTION, POWDER, FOR SOLUTION INTRAMUSCULAR; INTRAVENOUS at 03:03

## 2020-01-20 RX ADMIN — NIACIN 500 MG: 500 TABLET, EXTENDED RELEASE ORAL at 20:44

## 2020-01-20 RX ADMIN — ENOXAPARIN SODIUM 40 MG: 40 INJECTION SUBCUTANEOUS at 10:16

## 2020-01-20 RX ADMIN — POTASSIUM BICARBONATE 40 MEQ: 782 TABLET, EFFERVESCENT ORAL at 05:02

## 2020-01-20 NOTE — ED PROVIDER NOTES
Triage Chief Complaint:   Altered Mental Status (Pt brought in by EMS for AMS for the last few hours. Per pts , patient has had some episodes of hallucinations. )    KEZIA:  Radha Ibrahim is a 80 y.o. female that presents with episodes of confusion and hallucinations that started this evening as well as increased work of breathing that also started this evening. Patient given a sublingual nitro by the squad and brought her initial systolic blood pressure of 220 down and on arrival to the emergency department patient states her breathing is better. She is awake and alert and quite pleasant. She does note increased urinary frequency as well as subjective fevers with no nausea vomiting cough coryza runny nose or congestion. She denies abdominal pain or flank pain. She does have a history of congestive heart failure. She denies any chest pain but does note shortness of breath and lower extremity edema.     ROS:  General:  No fevers, no chills, no weakness  Eyes:  No recent vison changes, no discharge  ENT:  No sore throat, no nasal congestion, no hearing changes  Cardiovascular:  No chest pain, no palpitations  Respiratory: As above  Gastrointestinal:  No pain, no nausea, no vomiting, no diarrhea  Musculoskeletal:  No muscle pain, no joint pain  Skin:  No rash, no pruritis, no easy bruising  Neurologic:  No speech problems, no headache, no extremity numbness, no extremity tingling, no extremity weakness  Psychiatric:  No anxiety, no hallucinations or delusions, no suicidal or homicidal ideation  Genitourinary: As above  Endocrine:  No unexpected weight gain, no unexpected weight loss  Extremities:  no edema, no pain    Past Medical History:   Diagnosis Date    Acute congestive heart failure (HCC)     Atrial fibrillation (HCC)     CAD (coronary artery disease)     Cancer (HCC)     skin cancer    Diabetes mellitus (HCC)     GERD (gastroesophageal reflux disease)     Hemiplegia, unspecified, affecting nondominant side     History of blood transfusion     Hyperlipidemia     Hypertension     Other disorders of kidney and ureter in diseases classified elsewhere     Psychiatric problem     Unspecified cerebral artery occlusion with cerebral infarction 2012    Unspecified cerebral artery occlusion with cerebral infarction      Past Surgical History:   Procedure Laterality Date    CHOLECYSTECTOMY      CYSTOSCOPY  08/10/2017    DILATION AND CURETTAGE OF UTERUS  12/06/2016    HEMORRHOID SURGERY      KNEE ARTHROSCOPY      bilateral    SKIN BIOPSY       History reviewed. No pertinent family history.   Social History     Socioeconomic History    Marital status:      Spouse name: Not on file    Number of children: Not on file    Years of education: Not on file    Highest education level: Not on file   Occupational History    Not on file   Social Needs    Financial resource strain: Not on file    Food insecurity:     Worry: Not on file     Inability: Not on file    Transportation needs:     Medical: Not on file     Non-medical: Not on file   Tobacco Use    Smoking status: Never Smoker    Smokeless tobacco: Never Used   Substance and Sexual Activity    Alcohol use: No    Drug use: No    Sexual activity: Not Currently   Lifestyle    Physical activity:     Days per week: Not on file     Minutes per session: Not on file    Stress: Not on file   Relationships    Social connections:     Talks on phone: Not on file     Gets together: Not on file     Attends Christianity service: Not on file     Active member of club or organization: Not on file     Attends meetings of clubs or organizations: Not on file     Relationship status: Not on file    Intimate partner violence:     Fear of current or ex partner: Not on file     Emotionally abused: Not on file     Physically abused: Not on file     Forced sexual activity: Not on file   Other Topics Concern    Not on file   Social History Narrative    Not on Result Value Ref Range    Sodium 138 136 - 145 mmol/L    Potassium 3.1 (L) 3.5 - 5.1 mmol/L    Chloride 98 (L) 99 - 110 mmol/L    CO2 28 21 - 32 mmol/L    Anion Gap 12 3 - 16    Glucose 136 (H) 70 - 99 mg/dL    BUN 9 7 - 20 mg/dL    CREATININE 0.7 0.6 - 1.2 mg/dL    GFR Non-African American >60 >60    GFR African American >60 >60    Calcium 9.0 8.3 - 10.6 mg/dL    Total Protein 6.9 6.4 - 8.2 g/dL    Alb 3.6 3.4 - 5.0 g/dL    Albumin/Globulin Ratio 1.1 1.1 - 2.2    Total Bilirubin 1.2 (H) 0.0 - 1.0 mg/dL    Alkaline Phosphatase 87 40 - 129 U/L    ALT 11 10 - 40 U/L    AST 25 15 - 37 U/L    Globulin 3.3 g/dL   Troponin   Result Value Ref Range    Troponin <0.01 <0.01 ng/mL   Brain Natriuretic Peptide   Result Value Ref Range    Pro-BNP 2,019 (H) 0 - 449 pg/mL   Digoxin Level   Result Value Ref Range    Digoxin Lvl 0.8 0.8 - 2.0 ng/mL   EKG 12 Lead   Result Value Ref Range    Ventricular Rate 88 BPM    Atrial Rate 96 BPM    QRS Duration 116 ms    Q-T Interval 390 ms    QTc Calculation (Bazett) 471 ms    R Axis -47 degrees    T Axis 123 degrees    Diagnosis       Atrial fibrillationLeft axis deviationInferior infarct , age undeterminedMarked ST abnormality, possible lateral subendocardial injuryAbnormal ECGNo previous ECGs available      Radiographs (if obtained):  [] The following radiograph was interpreted by myself in the absence of a radiologist:   [] Radiologist's Report Reviewed:  XR CHEST PORTABLE   Final Result   Cardiomegaly and pulmonary vascular congestion. Bilateral subsegmental atelectasis. No lobar consolidation. Possible small left pleural effusion. Inferior subluxation of the left humeral head in relation to the glenoid is   increased from the prior study. EKG (if obtained): (All EKG's are interpreted by myself in the absence of a cardiologist) EKG compared to August 2018 is negative for dynamic or morphologic change. Atrial fibrillation with left axis deviation.   Rate

## 2020-01-20 NOTE — PROGRESS NOTES
4 Eyes Skin Assessment     The patient is being assess for   Admission    I agree that 2 RN's have performed a thorough Head to Toe Skin Assessment on the patient. ALL assessment sites listed below have been assessed. Areas assessed by both nurses:   [x]   Head, Face, and Ears   [x]   Shoulders, Back, and Chest, Abdomen  [x]   Arms, Elbows, and Hands   [x]   Coccyx, Sacrum, and Ischium  [x]   Legs, Feet, and Heels        No skin issues    **SHARE this note so that the co-signing nurse is able to place an eSignature**    Co-signer eSignature: Electronically signed by Mat Kruger RN on 1/20/20 at 4:43 PM    Does the Patient have Skin Breakdown?   No          Bong Prevention initiated:  No   Wound Care Orders initiated:  No      [facility] nurse consulted for Pressure Injury (Stage 3,4, Unstageable, DTI, NWPT, Complex wounds)and New or Established Ostomies:  No      Primary Nurse eSignature: Electronically signed by Rita Mcelroy RN on 1/20/20 at 5:05 PM

## 2020-01-20 NOTE — H&P
Hospital Medicine History & Physical      PCP: Sania Henderson MD    Date of Admission: 1/19/2020    Date of Service: Pt seen/examined on 1/20/2020    Chief Complaint:    Chief Complaint   Patient presents with    Altered Mental Status     Pt brought in by EMS for AMS for the last few hours. Per pts , patient has had some episodes of hallucinations. History Of Present Illness: The patient is a 80 y.o. female with chronic diastolic congestive heart failure, atrial fibrillation on full dose aspirin but not otherwise anticoagulated, CAD, diabetes mellitus, GERD, hypertension, hyperlipidemia, prior stroke who presented to [facility] ED with complaint of altered mental status. She is also been short of breath for the past 2 to 3 days. Denies any chest pain. No fever or chills. Past Medical History:        Diagnosis Date    Acute congestive heart failure (HCC)     Atrial fibrillation (HCC)     CAD (coronary artery disease)     Cancer (HCC)     skin cancer    Diabetes mellitus (HCC)     GERD (gastroesophageal reflux disease)     Hemiplegia, unspecified, affecting nondominant side     History of blood transfusion     Hyperlipidemia     Hypertension     Other disorders of kidney and ureter in diseases classified elsewhere     Psychiatric problem     Unspecified cerebral artery occlusion with cerebral infarction 2012    Unspecified cerebral artery occlusion with cerebral infarction        Past Surgical History:        Procedure Laterality Date    CHOLECYSTECTOMY      CYSTOSCOPY  08/10/2017    DILATION AND CURETTAGE OF UTERUS  12/06/2016    HEMORRHOID SURGERY      KNEE ARTHROSCOPY      bilateral    SKIN BIOPSY         Medications Prior to Admission:    Prior to Admission medications    Medication Sig Start Date End Date Taking?  Authorizing Provider   aspirin 325 MG EC tablet Take 325 mg by mouth daily   Yes Historical Provider, MD   Multiple Vitamins-Minerals (THERAPEUTIC MULTIVITAMIN-MINERALS) tablet Take 1 tablet by mouth daily   Yes Historical Provider, MD   Saccharomyces boulardii (PROBIOTIC) 250 MG CAPS Take 1 capsule by mouth daily   Yes Historical Provider, MD   butalbital-acetaminophen-caffeine (FIORICET, ESGIC) -40 MG per tablet Take 1 tablet by mouth every 6 hours as needed for Headaches   Yes Historical Provider, MD   Molindone HCl (MOBAN PO) Take by mouth   Yes Historical Provider, MD   tolterodine (DETROL LA) 2 MG extended release capsule TAKE 1 CAPSULE DAILY 1/17/20   Cedric Ragsdale MD   furosemide (LASIX) 40 MG tablet TAKE 1 TABLET DAILY 1/17/20   Cedric Ragsdale MD   escitalopram (LEXAPRO) 20 MG tablet TAKE 1 TABLET DAILY 1/17/20   Cedric Ragsdale MD   metoprolol succinate (TOPROL XL) 50 MG extended release tablet TAKE 1 TABLET DAILY 1/17/20   Cedric Ragsdale MD   simvastatin (ZOCOR) 20 MG tablet TAKE 1 TABLET NIGHTLY 12/4/19   Cedric Ragsdale MD   cephALEXin (KEFLEX) 250 MG capsule TAKE 1 CAPSULE DAILY 11/4/19   Cedric Ragsdale MD   QUEtiapine (SEROQUEL) 50 MG tablet Take 1 tablet by mouth nightly 11/4/19   Cedric Ragsdale MD   omeprazole (PRILOSEC) 40 MG delayed release capsule TAKE 1 CAPSULE DAILY 11/4/19   Cedric Ragsdale MD   spironolactone-hydrochlorothiazide (ALDACTAZIDE) 25-25 MG per tablet Take 1 tablet by mouth daily 6/13/19   Cedric Ragsdale MD   metFORMIN (GLUCOPHAGE) 500 MG tablet Take 1 tablet by mouth daily (with breakfast) 6/13/19   Cedric Ragsdale MD   digoxin [facility]) 125 MCG tablet Take 1 tablet by mouth daily 6/13/19   Cedric Ragsdale MD   losartan (COZAAR) 50 MG tablet Take 1 tablet by mouth daily 6/13/19   Cedric Ragsdale MD   Multiple Vitamins-Minerals-FA (OCUVEL PO) Take 1 tablet by mouth daily    Historical Provider, MD   olopatadine (PATADAY) 0.2 % SOLN ophthalmic solution Apply 1 drop to eye daily Both eyes    Historical Provider, MD   niacin 500 MG extended release capsule Non-tender. Non-distended. No masses. No organomegaly. Normal bowel sounds. No hernia. Skin: Warm and dry. No nodule on exposed extremities. No rash on exposed extremities. M/S: No cyanosis. No joint deformity. No clubbing. Neuro: Awake. not fully oriented no focal signs      I Louis Keen have reviewed the chart on 2016 [facility] and personally interviewed and examined patient, reviewed the data (labs and imaging) and after discussion with my PA formulated the plan. Agree with note with the following edits. HPI: 55-year-old female with a history of chronic diastolic and is untreated, atrial fibrillation, coronary disease, hypertension, type 2 diabetes, prior CVA presented emergency room with altered mental status of 2 to 3 days duration. She is also been short of breath for the past few days. Denies fever. No chest pain. Has had a nonproductive cough for the past 3 weeks or so. I reviewed the patient's Past Medical History, Past Surgical History, Medications, and Allergies. Physical exam:    BP (!) 142/76   Pulse 90   Temp 96.6 °F (35.9 °C) (Axillary)   Resp 18   Ht 5' 2\" (1.575 m)   Wt 257 lb 3.2 oz (116.7 kg)   SpO2 97%   BMI 47.04 kg/m²     Gen: No distress. Alert. Eyes: PERRL. No sclera icterus. No conjunctival injection. ENT: No discharge. Pharynx clear. Neck: Trachea midline. Normal thyroid. Resp: No accessory muscle use. bibasilar  crackles. No wheezes. No rhonchi. No dullness on percussion. CV: irregular rhythm. No murmur or rub. 1+  edema. GI: Non-tender. Non-distended. No masses. No organomegaly. Normal bowel sounds. No hernia. Skin: Warm and dry. No nodule on exposed extremities. No rash on exposed extremities. Lymph: No cervical LAD. No supraclavicular LAD. M/S: No cyanosis. No joint deformity. No clubbing. Neuro: Awake. Reflexes 2+ symmetric bilaterally. Moves all 4 extremities, non focal  Psych: Oriented x 3. No anxiety or agitation. RAYNA Galindo M.D.      CBC:   Recent Labs     01/20/20  0000   WBC 8.7   HGB 12.2   HCT 37.4   MCV 93.0        BMP:   Recent Labs     01/20/20  0248 01/20/20  0717    139   K 3.1* 3.5   CL 98* 97*   CO2 28 28   PHOS  --  4.0   BUN 9 9   CREATININE 0.7 0.7     LIVER PROFILE:   Recent Labs     01/20/20  0248   AST 25   ALT 11   BILITOT 1.2*   ALKPHOS 87     UA:  Recent Labs     01/20/20  0045   COLORU Yellow   PHUR 5.5   WBCUA *   RBCUA 3-5*   MUCUS 1+*   YEAST Present*   BACTERIA 1+*   CLARITYU CLOUDY*   SPECGRAV >=1.030   LEUKOCYTESUR MODERATE*   UROBILINOGEN 0.2   BILIRUBINUR SMALL*   BLOODU SMALL*   GLUCOSEU Negative   AMORPHOUS 1+*     CARDIAC ENZYMES  Recent Labs     01/20/20  0248 01/20/20  0717   TROPONINI <0.01 <0.01     Pro-BNP 2,019High         CULTURES  Urine Cx: Pending   Blood Cx: Pending   Stool Cx: pending     EKG:  I have reviewed the EKG with the following interpretation:   Atrial fibrillation with intraventricular conduction delay, left axis,diffuse Q waves, no acute ST segment changes concerning for acute ischemia       RADIOLOGY  XR CHEST PORTABLE   Final Result   Cardiomegaly and pulmonary vascular congestion. Bilateral subsegmental atelectasis. No lobar consolidation. Possible small left pleural effusion. Inferior subluxation of the left humeral head in relation to the glenoid is   increased from the prior study. Pertinent previous results reviewed   Echocardiogram: 8/25/18  Conclusions   Left ventricular systolic function appears hyperdynamic with ejection   fraction estimated at 60-65 %   There is mild concentric left ventricular hypertrophy. Grade III diastolic dysfunction with elevated filling pressure. Biatrial enlargement. The aortic valve is thickened/calcified with decreased leaflet mobility   consistent with aortic stenosis. Mild aortic stenosis. Mitral annular calcification is present.    Mild to moderate mitral regurgitation. Moderate tricuspid regurgitation. Systolic pulmonary artery pressure (SPAP) estimated at 40 mmHg (right atrial   pressure 3 mmHg), consistent with mild pulmonary hypertension. ASSESSMENT/PLAN:  Altered mental status  Acute metabolic encephalopathy  -In the setting of acute UTI  -Also with acute on chronic CHF with acute hypoxic respiratory failure  -Continue to monitor closely    Hypoxia  -Suspect secondary to acute on chronic CHF  -Last echo with EF 60 to 65% with grade 3 diastolic dysfunction  -Strict I's and O's, daily weights  -IV Lasix 20 mg twice daily  -Continue IV diuretics  -Wean supplemental O2 as tolerated    Urinary tract infection  -UA with moderate leukocyte esterase,  white blood cells  -Urine culture pending  -Continue Rocephin    Hypokalemia--resolved  -Mild, 3.1 on admission  -Sliding-scale replacement    Chronic Atrial fibrillation  -On pradaxa  -EKG with atrial fibrillation, rate controlled  -Continue telemetry monitoring  -Continue home medications including digoxin, Toprol    DM2  -Glucose is well controlled  -Sliding-scale insulin     CAD  -Continue home medications  -No acute ACS symptoms    HTN  - BP is elevated  -Per ED note, was given sublingual nitro for significantly elevated blood pressures on arrival by EMS  - Continue home medication    L hemiplegia   Hx of prior stroke  - uses wheelchair at home  - supportive care     Morbid Obesity  - Body mass index is 47.04 kg/m². - Complicating assessment and treatment. Placing patient at risk for multiple co-morbidities as well as early death and contributing to the patient's presentation.   - Counseled on weight loss. DVT Prophylaxis: Lovenox  Diet: DIET CARB CONTROL;   Code Status: Full Code      Norma Gibbs PA-C  1/20/2020 10:53 AM    Agree with above    CORINNA Hull.

## 2020-01-20 NOTE — ED NOTES
[id number] perfect serve to Dr. Piyush Souza for admission consult per Dr. Rayo Prado.      [facility]  01/20/20 0330

## 2020-01-20 NOTE — CARE COORDINATION
Case Management Assessment  Initial Evaluation    Date/Time of Evaluation: 1/20/2020 4:28 PM  Assessment Completed by: Escobar Canas    Patient Name: Radha Ibrahim  YOB: 1933  Diagnosis: Heart failure ([address] [I50.9]  Date / Time: 1/19/2020 11:10 PM  Admission status/Date:  IP 1/20/20  Chart Reviewed: Yes      Patient Interviewed: Yes   Family Interviewed:  Yes - spouse and son      Hospitalization in the last 30 days:  No    Contacts  :     Relationship to Patient:   Phone Number:    Alternate Contact:     Relationship to Patient:     Phone Number:    Met with:    Current PCP  Martha Quinn required for SNF : Y, N        3 night stay required - Y, N    ADLS  Support Systems: Spouse/Significant Other, Children, Family Members  Transportation: family    Meal Preparation: family    Housing  Home Environment: uses 1st floor of home w/spouse  Steps:   Plans to Return to Present Housing: Yes  Other Identified Issues:     Alex [address] 78  Currently active with [address] : No  Has private pay HHA/homemaker 4hrs/day, 5days/week     Passport/Waiver : No  :                      Phone Number:    Passport/Waiver Services:           Durable Medical Equipment   DME Provider:   Equipment:  Walker___Cane___RTS___ BSC___Shower Chair_X__  02__ at ____Liter(s)---Uses________HHN___ CPAP___ BiPap___ Hospital Bed_X__W/C__X__Other__HOYER___, JUANY      Has Home O2 in place on admit:  No  Informed of need to bring portable home O2 tank on day of discharge for nursing to connect prior to leaving:   Not Indicated  Verbalized agreement/Understanding:   Not Indicated    Community Service Affiliation  Dialysis:  No    Outpatient PT/OT: No    Cancer Center: No     CHF Clinic: No     Pulmonary Rehab: No  Pain Clinic: No  Community Mental Health: No    Wound Clinic: No     Other:     DISCHARGE PLAN:  Reviewed chart and met with patient and family.

## 2020-01-21 LAB
ANION GAP SERPL CALCULATED.3IONS-SCNC: 14 MMOL/L (ref 3–16)
BASOPHILS ABSOLUTE: 0 K/UL (ref 0–0.2)
BASOPHILS RELATIVE PERCENT: 0.5 %
BUN BLDV-MCNC: 10 MG/DL (ref 7–20)
CALCIUM SERPL-MCNC: 9.4 MG/DL (ref 8.3–10.6)
CHLORIDE BLD-SCNC: 92 MMOL/L (ref 99–110)
CO2: 29 MMOL/L (ref 21–32)
CREAT SERPL-MCNC: 0.7 MG/DL (ref 0.6–1.2)
EOSINOPHILS ABSOLUTE: 0.3 K/UL (ref 0–0.6)
EOSINOPHILS RELATIVE PERCENT: 3.9 %
GFR AFRICAN AMERICAN: >60
GFR NON-AFRICAN AMERICAN: >60
GLUCOSE BLD-MCNC: 125 MG/DL (ref 70–99)
GLUCOSE BLD-MCNC: 140 MG/DL (ref 70–99)
GLUCOSE BLD-MCNC: 143 MG/DL (ref 70–99)
GLUCOSE BLD-MCNC: 145 MG/DL (ref 70–99)
GLUCOSE BLD-MCNC: 162 MG/DL (ref 70–99)
HCT VFR BLD CALC: 37.2 % (ref 36–48)
HEMOGLOBIN: 12 G/DL (ref 12–16)
LYMPHOCYTES ABSOLUTE: 1.8 K/UL (ref 1–5.1)
LYMPHOCYTES RELATIVE PERCENT: 22.9 %
MAGNESIUM: 1.8 MG/DL (ref 1.8–2.4)
MCH RBC QN AUTO: 30.2 PG (ref 26–34)
MCHC RBC AUTO-ENTMCNC: 32.3 G/DL (ref 31–36)
MCV RBC AUTO: 93.5 FL (ref 80–100)
MONOCYTES ABSOLUTE: 0.7 K/UL (ref 0–1.3)
MONOCYTES RELATIVE PERCENT: 8.6 %
NEUTROPHILS ABSOLUTE: 5 K/UL (ref 1.7–7.7)
NEUTROPHILS RELATIVE PERCENT: 64.1 %
ORGANISM: ABNORMAL
PDW BLD-RTO: 13.7 % (ref 12.4–15.4)
PERFORMED ON: ABNORMAL
PLATELET # BLD: 224 K/UL (ref 135–450)
PMV BLD AUTO: 7.5 FL (ref 5–10.5)
POTASSIUM REFLEX MAGNESIUM: 2.8 MMOL/L (ref 3.5–5.1)
POTASSIUM SERPL-SCNC: 3.3 MMOL/L (ref 3.5–5.1)
RBC # BLD: 3.98 M/UL (ref 4–5.2)
SODIUM BLD-SCNC: 135 MMOL/L (ref 136–145)
URINE CULTURE, ROUTINE: ABNORMAL
WBC # BLD: 7.9 K/UL (ref 4–11)

## 2020-01-21 PROCEDURE — 2060000000 HC ICU INTERMEDIATE R&B

## 2020-01-21 PROCEDURE — 83735 ASSAY OF MAGNESIUM: CPT

## 2020-01-21 PROCEDURE — 6360000002 HC RX W HCPCS: Performed by: PHYSICIAN ASSISTANT

## 2020-01-21 PROCEDURE — 6360000002 HC RX W HCPCS: Performed by: HOSPITALIST

## 2020-01-21 PROCEDURE — 6370000000 HC RX 637 (ALT 250 FOR IP): Performed by: HOSPITALIST

## 2020-01-21 PROCEDURE — 80048 BASIC METABOLIC PNL TOTAL CA: CPT

## 2020-01-21 PROCEDURE — 36415 COLL VENOUS BLD VENIPUNCTURE: CPT

## 2020-01-21 PROCEDURE — 2580000003 HC RX 258: Performed by: HOSPITALIST

## 2020-01-21 PROCEDURE — 84132 ASSAY OF SERUM POTASSIUM: CPT

## 2020-01-21 PROCEDURE — 85025 COMPLETE CBC W/AUTO DIFF WBC: CPT

## 2020-01-21 PROCEDURE — 6370000000 HC RX 637 (ALT 250 FOR IP): Performed by: INTERNAL MEDICINE

## 2020-01-21 PROCEDURE — 99232 SBSQ HOSP IP/OBS MODERATE 35: CPT | Performed by: INTERNAL MEDICINE

## 2020-01-21 RX ORDER — LOSARTAN POTASSIUM 100 MG/1
100 TABLET ORAL DAILY
Status: DISCONTINUED | OUTPATIENT
Start: 2020-01-21 | End: 2020-01-22 | Stop reason: HOSPADM

## 2020-01-21 RX ORDER — POTASSIUM CHLORIDE 20 MEQ/1
40 TABLET, EXTENDED RELEASE ORAL ONCE
Status: COMPLETED | OUTPATIENT
Start: 2020-01-21 | End: 2020-01-21

## 2020-01-21 RX ORDER — LOSARTAN POTASSIUM 50 MG/1
50 TABLET ORAL DAILY
Qty: 30 TABLET | Refills: 0 | Status: ON HOLD | OUTPATIENT
Start: 2020-01-21 | End: 2021-08-17 | Stop reason: SDUPTHER

## 2020-01-21 RX ORDER — FUROSEMIDE 40 MG/1
TABLET ORAL
Qty: 45 TABLET | Refills: 0 | Status: SHIPPED | OUTPATIENT
Start: 2020-01-21 | End: 2020-04-06

## 2020-01-21 RX ADMIN — SPIRONOLACTONE 25 MG: 25 TABLET ORAL at 09:16

## 2020-01-21 RX ADMIN — POTASSIUM CHLORIDE 10 MEQ: 7.46 INJECTION, SOLUTION INTRAVENOUS at 10:45

## 2020-01-21 RX ADMIN — POTASSIUM CHLORIDE 10 MEQ: 7.46 INJECTION, SOLUTION INTRAVENOUS at 09:10

## 2020-01-21 RX ADMIN — ESCITALOPRAM OXALATE 20 MG: 10 TABLET ORAL at 09:16

## 2020-01-21 RX ADMIN — INSULIN LISPRO 1 UNITS: 100 INJECTION, SOLUTION INTRAVENOUS; SUBCUTANEOUS at 09:19

## 2020-01-21 RX ADMIN — DIGOXIN 125 MCG: 125 TABLET ORAL at 09:16

## 2020-01-21 RX ADMIN — Medication 10 ML: at 09:18

## 2020-01-21 RX ADMIN — Medication 10 ML: at 20:38

## 2020-01-21 RX ADMIN — POTASSIUM CHLORIDE 40 MEQ: 1500 TABLET, EXTENDED RELEASE ORAL at 17:02

## 2020-01-21 RX ADMIN — QUETIAPINE FUMARATE 50 MG: 25 TABLET ORAL at 20:38

## 2020-01-21 RX ADMIN — HYDROCHLOROTHIAZIDE 25 MG: 25 TABLET ORAL at 09:16

## 2020-01-21 RX ADMIN — PANTOPRAZOLE SODIUM 40 MG: 40 TABLET, DELAYED RELEASE ORAL at 06:41

## 2020-01-21 RX ADMIN — INSULIN LISPRO 1 UNITS: 100 INJECTION, SOLUTION INTRAVENOUS; SUBCUTANEOUS at 17:02

## 2020-01-21 RX ADMIN — POTASSIUM CHLORIDE 40 MEQ: 1500 TABLET, EXTENDED RELEASE ORAL at 14:50

## 2020-01-21 RX ADMIN — NIACIN 500 MG: 500 TABLET, EXTENDED RELEASE ORAL at 20:39

## 2020-01-21 RX ADMIN — CEFTRIAXONE SODIUM 1 G: 1 INJECTION, POWDER, FOR SOLUTION INTRAMUSCULAR; INTRAVENOUS at 01:30

## 2020-01-21 RX ADMIN — FUROSEMIDE 20 MG: 10 INJECTION, SOLUTION INTRAMUSCULAR; INTRAVENOUS at 17:01

## 2020-01-21 RX ADMIN — SIMVASTATIN 20 MG: 10 TABLET, FILM COATED ORAL at 20:38

## 2020-01-21 RX ADMIN — ASPIRIN 325 MG: 325 TABLET, DELAYED RELEASE ORAL at 09:16

## 2020-01-21 RX ADMIN — POTASSIUM CHLORIDE 10 MEQ: 7.46 INJECTION, SOLUTION INTRAVENOUS at 12:47

## 2020-01-21 RX ADMIN — TROSPIUM CHLORIDE 20 MG: 20 TABLET, FILM COATED ORAL at 09:17

## 2020-01-21 RX ADMIN — FUROSEMIDE 20 MG: 10 INJECTION, SOLUTION INTRAMUSCULAR; INTRAVENOUS at 09:17

## 2020-01-21 RX ADMIN — TROSPIUM CHLORIDE 20 MG: 20 TABLET, FILM COATED ORAL at 17:04

## 2020-01-21 RX ADMIN — LOSARTAN POTASSIUM 100 MG: 100 TABLET, FILM COATED ORAL at 11:56

## 2020-01-21 RX ADMIN — KETOTIFEN FUMARATE 1 DROP: 0.35 SOLUTION/ DROPS OPHTHALMIC at 09:27

## 2020-01-21 RX ADMIN — METOPROLOL SUCCINATE 50 MG: 50 TABLET, EXTENDED RELEASE ORAL at 09:17

## 2020-01-21 RX ADMIN — POTASSIUM CHLORIDE 10 MEQ: 7.46 INJECTION, SOLUTION INTRAVENOUS at 06:41

## 2020-01-21 NOTE — PROGRESS NOTES
AM assessment completed. Scheduled medications given per MAR. VSS on room air. A/O to self only. Denies any needs, call light in reach. Will monitor.

## 2020-01-21 NOTE — CARE COORDINATION
DISCHARGE ORDER  Date/Time 2020 1:58 PM  Completed by: Radha Gan, Case Management    Patient Name: Arik Storm    : 1933  Admitting Diagnosis: Heart failure ([address] [I50.9]      Admit order Date and Status:2020 2310 inpt  (verify MD's last order for status of admission)      Noted discharge order. Confirmed discharge plan with patient / family (pt): Yes    If pt confirmed DC plan does family need to be contacted by CM No if yes who______  Discharge Plan: Reviewed chart. Role of discharge planner explained and patient verbalized understanding. Discharge order is noted. Pt is being d/c'd to home today. Pt's O2 sats are 97% on RA. Pt has a private duty HHA for 4 hours/ day, 5 days a week. Pt declines HHC. Pt states taht her spouse will pick her up to transport her home. No further discharge needs needed or noted. Pt's K+ is 2.8, and /115. This may hold up discharge. Reviewed chart. Role of discharge planner explained and patient verbalized understanding. Discharge order is noted. Has Home O2 in place on admit:  No  Informed of need to bring portable home O2 tank on day of discharge for nursing to connect prior to leaving:   Not Indicated  Verbalized agreement/Understanding:   Not Indicated    Discharge timeout done with Andie Webber RN. All discharge needs and concerns addressed.

## 2020-01-21 NOTE — PROGRESS NOTES
rhythm. No murmur or rub. 1+  edema. GI: Non-tender. Non-distended. No masses. No organomegaly. Normal bowel sounds. No hernia. Skin: Warm and dry. No nodule on exposed extremities. No rash on exposed extremities. Lymph: No cervical LAD. No supraclavicular LAD. M/S: No cyanosis. No joint deformity. No clubbing. Neuro: Awake. Moves all 4 extremities, non focal  Psych: Oriented x 3. No anxiety or agitation. Lab Data:  CBC:   Recent Labs     01/20/20  0000 01/21/20  0530   WBC 8.7 7.9   RBC 4.01 3.98*   HGB 12.2 12.0   HCT 37.4 37.2   MCV 93.0 93.5   RDW 13.8 13.7    224     BMP:   Recent Labs     01/20/20  0248 01/20/20  0717 01/21/20  0530    139 135*   K 3.1* 3.5 2.8*   CL 98* 97* 92*   CO2 28 28 29   PHOS  --  4.0  --    BUN 9 9 10   CREATININE 0.7 0.7 0.7     BNP: No results for input(s): BNP in the last 72 hours. PT/INR: No results for input(s): PROTIME, INR in the last 72 hours. APTT:No results for input(s): APTT in the last 72 hours. CARDIAC ENZYMES:   Recent Labs     01/20/20  0717 01/20/20  1252 01/20/20  1805   TROPONINI <0.01 <0.01 <0.01     FASTING LIPID PANEL:  Lab Results   Component Value Date    CHOL 129 08/26/2018    HDL 36 08/26/2018    TRIG 144 08/26/2018     LIVER PROFILE:   Recent Labs     01/20/20  0248   AST 25   ALT 11   BILITOT 1.2*   ALKPHOS 87       EKG:  I have reviewed the EKG with the following interpretation:   Atrial fibrillation with intraventricular conduction delay, left axis,diffuse Q waves, no acute ST segment changes concerning for acute ischemia         RADIOLOGY  XR CHEST PORTABLE   Final Result   Cardiomegaly and pulmonary vascular congestion.       Bilateral subsegmental atelectasis. No lobar consolidation.       Possible small left pleural effusion.       Inferior subluxation of the left humeral head in relation to the glenoid is   increased from the prior study.          Assessment & Plan:     Patient Active Problem List    Diagnosis Date Noted

## 2020-01-21 NOTE — PLAN OF CARE
Problem: OXYGENATION/RESPIRATORY FUNCTION  Goal: Patient will maintain patent airway  Outcome: Ongoing  Goal: Patient will achieve/maintain normal respiratory rate/effort  Description  Respiratory rate and effort will be within normal limits for the patient  Outcome: Ongoing     Problem: HEMODYNAMIC STATUS  Goal: Patient has stable vital signs and fluid balance  Outcome: Ongoing     Problem: FLUID AND ELECTROLYTE IMBALANCE  Goal: Fluid and electrolyte balance are achieved/maintained  Outcome: Ongoing  Note:   Patient's EF (Ejection Fraction) is greater than 40%    Patient's weights and intake/output reviewed:    Patient's Last Weight: 257 lbs obtained by bed scale. Difference of 0 lbs n/a  than last documented weight. Intake/Output Summary (Last 24 hours) at 1/20/2020 1944  Last data filed at 1/20/2020 1753  Gross per 24 hour   Intake 100 ml   Output 200 ml   Net -100 ml         Pt is currently on 0 L O2. Pt denies shortness of breath. Pt with nonpitting lower extremity edema. Patient and/or Family's stated Goal of Care this Admission: reduce shortness of breath, increase activity tolerance, better understand heart failure and disease management, be more comfortable, and reduce lower extremity edema prior to discharge      Comorbidities Reviewed Yes  Patient has a past medical history of Acute congestive heart failure ([address] Atrial fibrillation ([address] CAD (coronary artery disease), Cancer ([address] Diabetes mellitus ([address] GERD (gastroesophageal reflux disease), Hemiplegia, unspecified, affecting nondominant side, History of blood transfusion, Hyperlipidemia, Hypertension, Other disorders of kidney and ureter in diseases classified elsewhere, Psychiatric problem, Unspecified cerebral artery occlusion with cerebral infarction, and Unspecified cerebral artery occlusion with cerebral infarction.          >>For CHF and Comorbidity documentation on Education Time and Topics, please see Education Tab      Problem:

## 2020-01-21 NOTE — PROGRESS NOTES
4 Eyes Skin Assessment     The patient is being assess for   Shift Handoff    I agree that 2 RN's have performed a thorough Head to Toe Skin Assessment on the patient. ALL assessment sites listed below have been assessed. Areas assessed by both nurses:   [x]   Head, Face, and Ears   [x]   Shoulders, Back, and Chest, Abdomen  [x]   Arms, Elbows, and Hands   [x]   Coccyx, Sacrum, and Ischium  [x]   Legs, Feet, and Heels        No skin issues    **SHARE this note so that the co-signing nurse is able to place an eSignature**    Co-signer eSignature: Electronically signed by Pietro Barron RN on 1/20/20 at 11:38 PM    Does the Patient have Skin Breakdown?   No          Bong Prevention initiated:  Yes   Wound Care Orders initiated:  No      WOC nurse consulted for Pressure Injury (Stage 3,4, Unstageable, DTI, NWPT, Complex wounds)and New or Established Ostomies:  No      Primary Nurse eSignature: Electronically signed by Yanni Virk RN on 1/20/20 at 7:56 PM

## 2020-01-21 NOTE — PROGRESS NOTES
4 Eyes Skin Assessment     The patient is being assess for   Shift Handoff    I agree that 2 RN's have performed a thorough Head to Toe Skin Assessment on the patient. ALL assessment sites listed below have been assessed. Areas assessed by both nurses:   [x]   Head, Face, and Ears   [x]   Shoulders, Back, and Chest, Abdomen  [x]   Arms, Elbows, and Hands   [x]   Coccyx, Sacrum, and Ischium  [x]   Legs, Feet, and Heels        Blanchable redness to buttocks, scattered bruising. **SHARE this note so that the co-signing nurse is able to place an eSignature**    Co-signer eSignature: Electronically signed by Ten Lea RN on 1/21/20 at 11:27 AM    Does the Patient have Skin Breakdown?   No          Obng Prevention initiated:  Yes   Wound Care Orders initiated:  Yes      [id number]  nurse consulted for Pressure Injury (Stage 3,4, Unstageable, DTI, NWPT, Complex wounds)and New or Established Ostomies:  No      Primary Nurse eSignature: Electronically signed by Joy Andres RN on 1/20/20 at 11:39 PM

## 2020-01-22 VITALS
DIASTOLIC BLOOD PRESSURE: 78 MMHG | RESPIRATION RATE: 19 BRPM | BODY MASS INDEX: 47.33 KG/M2 | HEIGHT: 62 IN | OXYGEN SATURATION: 91 % | WEIGHT: 257.2 LBS | TEMPERATURE: 98.1 F | HEART RATE: 90 BPM | SYSTOLIC BLOOD PRESSURE: 149 MMHG

## 2020-01-22 LAB
ANION GAP SERPL CALCULATED.3IONS-SCNC: 15 MMOL/L (ref 3–16)
BUN BLDV-MCNC: 13 MG/DL (ref 7–20)
CALCIUM SERPL-MCNC: 9.1 MG/DL (ref 8.3–10.6)
CHLORIDE BLD-SCNC: 96 MMOL/L (ref 99–110)
CO2: 28 MMOL/L (ref 21–32)
CREAT SERPL-MCNC: 0.6 MG/DL (ref 0.6–1.2)
GFR AFRICAN AMERICAN: >60
GFR NON-AFRICAN AMERICAN: >60
GLUCOSE BLD-MCNC: 125 MG/DL (ref 70–99)
GLUCOSE BLD-MCNC: 128 MG/DL (ref 70–99)
GLUCOSE BLD-MCNC: 140 MG/DL (ref 70–99)
MAGNESIUM: 1.9 MG/DL (ref 1.8–2.4)
PERFORMED ON: ABNORMAL
PERFORMED ON: ABNORMAL
POTASSIUM REFLEX MAGNESIUM: 3.4 MMOL/L (ref 3.5–5.1)
SODIUM BLD-SCNC: 139 MMOL/L (ref 136–145)

## 2020-01-22 PROCEDURE — 6370000000 HC RX 637 (ALT 250 FOR IP): Performed by: HOSPITALIST

## 2020-01-22 PROCEDURE — 83735 ASSAY OF MAGNESIUM: CPT

## 2020-01-22 PROCEDURE — 2580000003 HC RX 258: Performed by: HOSPITALIST

## 2020-01-22 PROCEDURE — 6360000002 HC RX W HCPCS: Performed by: HOSPITALIST

## 2020-01-22 PROCEDURE — 99238 HOSP IP/OBS DSCHRG MGMT 30/<: CPT | Performed by: INTERNAL MEDICINE

## 2020-01-22 PROCEDURE — 6370000000 HC RX 637 (ALT 250 FOR IP): Performed by: INTERNAL MEDICINE

## 2020-01-22 PROCEDURE — 80048 BASIC METABOLIC PNL TOTAL CA: CPT

## 2020-01-22 PROCEDURE — 36415 COLL VENOUS BLD VENIPUNCTURE: CPT

## 2020-01-22 RX ORDER — POTASSIUM CHLORIDE 20 MEQ/1
20 TABLET, EXTENDED RELEASE ORAL DAILY
Qty: 30 TABLET | Refills: 0 | Status: SHIPPED | OUTPATIENT
Start: 2020-01-22 | End: 2020-02-25 | Stop reason: SDUPTHER

## 2020-01-22 RX ORDER — POTASSIUM CHLORIDE 750 MG/1
40 TABLET, EXTENDED RELEASE ORAL ONCE
Status: COMPLETED | OUTPATIENT
Start: 2020-01-22 | End: 2020-01-22

## 2020-01-22 RX ADMIN — DIGOXIN 125 MCG: 125 TABLET ORAL at 08:47

## 2020-01-22 RX ADMIN — PANTOPRAZOLE SODIUM 40 MG: 40 TABLET, DELAYED RELEASE ORAL at 06:23

## 2020-01-22 RX ADMIN — SPIRONOLACTONE 25 MG: 25 TABLET ORAL at 08:48

## 2020-01-22 RX ADMIN — ESCITALOPRAM OXALATE 20 MG: 10 TABLET ORAL at 08:47

## 2020-01-22 RX ADMIN — TROSPIUM CHLORIDE 20 MG: 20 TABLET, FILM COATED ORAL at 06:23

## 2020-01-22 RX ADMIN — KETOTIFEN FUMARATE 1 DROP: 0.35 SOLUTION/ DROPS OPHTHALMIC at 08:50

## 2020-01-22 RX ADMIN — Medication 10 ML: at 08:48

## 2020-01-22 RX ADMIN — POTASSIUM CHLORIDE 40 MEQ: 750 TABLET, EXTENDED RELEASE ORAL at 09:57

## 2020-01-22 RX ADMIN — HYDROCHLOROTHIAZIDE 25 MG: 25 TABLET ORAL at 08:48

## 2020-01-22 RX ADMIN — METOPROLOL SUCCINATE 50 MG: 50 TABLET, EXTENDED RELEASE ORAL at 08:47

## 2020-01-22 RX ADMIN — FUROSEMIDE 20 MG: 10 INJECTION, SOLUTION INTRAMUSCULAR; INTRAVENOUS at 08:47

## 2020-01-22 RX ADMIN — ASPIRIN 325 MG: 325 TABLET, DELAYED RELEASE ORAL at 08:48

## 2020-01-22 RX ADMIN — LOSARTAN POTASSIUM 100 MG: 100 TABLET, FILM COATED ORAL at 08:47

## 2020-01-22 NOTE — PROGRESS NOTES
Pt resting in bed. A & O to person, time, and place. Pt to be discharged today. Pt stated she is feeling much better and ready to go home. Call light within reach. Possessions within reach. Pt denies any further needs at this time.

## 2020-01-22 NOTE — DISCHARGE SUMMARY
01/20/20  0045   COLORU Yellow   PHUR 5.5   WBCUA *   RBCUA 3-5*   MUCUS 1+*   YEAST Present*   BACTERIA 1+*   CLARITYU CLOUDY*   SPECGRAV >=1.030   LEUKOCYTESUR MODERATE*   UROBILINOGEN 0.2   BILIRUBINUR SMALL*   BLOODU SMALL*   GLUCOSEU Negative   AMORPHOUS 1+*     CULTURES  Urine Cx: yeast   Blood Cx: NGTD  Stool Culture: pending     RADIOLOGY  XR CHEST PORTABLE   Final Result   Cardiomegaly and pulmonary vascular congestion. Bilateral subsegmental atelectasis. No lobar consolidation. Possible small left pleural effusion. Inferior subluxation of the left humeral head in relation to the glenoid is   increased from the prior study. Discharge Medications     Medication List      START taking these medications    potassium chloride 20 MEQ extended release tablet  Commonly known as:  KLOR-CON M  Take 1 tablet by mouth daily        CHANGE how you take these medications    ammonium lactate 12 % lotion  Commonly known as:  Lac-Hydrin  Apply topically daily. What changed:    · how to take this  · when to take this  · reasons to take this     furosemide 40 MG tablet  Commonly known as:  LASIX  TAKE 1 TABLET in am ,1/2 in pm  What changed:  See the new instructions. QUEtiapine 50 MG tablet  Commonly known as:  SEROQUEL  Take 1 tablet by mouth nightly  What changed:  how much to take        CONTINUE taking these medications    aspirin 325 MG EC tablet     blood glucose test strips strip  Commonly known as:  ONE TOUCH TEST STRIPS  Patient tests once daily.   DX: E11.9     butalbital-acetaminophen-caffeine -40 MG per tablet  Commonly known as:  FIORICET, ESGIC     digoxin 125 MCG tablet  Commonly known as:  LANOXIN  Take 1 tablet by mouth daily     escitalopram 20 MG tablet  Commonly known as:  LEXAPRO  TAKE 1 TABLET DAILY     losartan 50 MG tablet  Commonly known as:  COZAAR  Take 1 tablet by mouth daily     metFORMIN 500 MG tablet  Commonly known as:  GLUCOPHAGE  Take 1 tablet by mouth daily (with breakfast)     metoprolol succinate 50 MG extended release tablet  Commonly known as:  TOPROL XL  TAKE 1 TABLET DAILY     MOBAN PO     niacin 500 MG extended release capsule     OCUVEL PO     omeprazole 40 MG delayed release capsule  Commonly known as:  PRILOSEC  TAKE 1 CAPSULE DAILY     ONE TOUCH BASIC SYSTEM w/Device Kit  Patient tests once daily. DX:E11.9     ONE TOUCH LANCETS Misc  Patient tests once daily. DX:E11.9     Pataday 0.2 % Soln ophthalmic solution  Generic drug:  olopatadine     Probiotic 250 MG Caps     simvastatin 20 MG tablet  Commonly known as:  ZOCOR  TAKE 1 TABLET NIGHTLY     spironolactone-hydrochlorothiazide 25-25 MG per tablet  Commonly known as:  ALDACTAZIDE  Take 1 tablet by mouth daily     therapeutic multivitamin-minerals tablet     tolterodine 2 MG extended release capsule  Commonly known as:  DETROL LA  TAKE 1 CAPSULE DAILY        STOP taking these medications    cephALEXin 250 MG capsule  Commonly known as:  Adaline Prow           Where to Get Your Medications      These medications were sent to [place] [id number] [facility], [place] - [address] [facility] - P [phone] - F [phone]  [address] [facility] [address]    Phone:  [phone]   · furosemide 40 MG tablet  · losartan 50 MG tablet  · potassium chloride 20 MEQ extended release tablet       Discharged in stable condition to home, has felecia Sharif 78     Follow Up: Follow up with PCP in 1 week      CORINNA Hull.

## 2020-01-22 NOTE — PROGRESS NOTES
4 Eyes Skin Assessment     The patient is being assess for   Shift Handoff    I agree that 2 RN's have performed a thorough Head to Toe Skin Assessment on the patient. ALL assessment sites listed below have been assessed. Areas assessed by both nurses:   [x]   Head, Face, and Ears   [x]   Shoulders, Back, and Chest, Abdomen  [x]   Arms, Elbows, and Hands   [x]   Coccyx, Sacrum, and Ischium  [x]   Legs, Feet, and Heels        No skin breakdown    **SHARE this note so that the co-signing nurse is able to place an eSignature**    Co-signer eSignature: Electronically signed by Cosmo Black RN on 1/21/20 at 8:56 PM    Does the Patient have Skin Breakdown?   No          Bong Prevention initiated:  Yes   Wound Care Orders initiated:  No      WOC nurse consulted for Pressure Injury (Stage 3,4, Unstageable, DTI, NWPT, Complex wounds)and New or Established Ostomies:  No      Primary Nurse eSignature: Electronically signed by Latonia Banegas RN on 1/21/20 at 7:22 PM

## 2020-01-22 NOTE — PROGRESS NOTES
Discharge paperwork and instructions reviewed with patient and family. Verbalized understanding. New medications reviewed. PIV and tele monitor removed. Pt belongings sent with pt. Stable condition at d/c.

## 2020-01-22 NOTE — PROGRESS NOTES
Shift assessment completed and documented in doc flow sheet. Pt is alert and oriented x4 resting quietly in bed. VSS, AFib in the 80's on monitor. PIV's WNL. Elicia  in place for incontinence. Call light is within reach and pt encouraged to call with any needs. Bed is in lowest position with side rails up and wheels locked. Will monitor.

## 2020-01-22 NOTE — PROGRESS NOTES
rhythm. No murmur or rub. 1+  edema. GI: Non-tender. Non-distended. No masses. No organomegaly. Normal bowel sounds. No hernia. Skin: Warm and dry. No nodule on exposed extremities. No rash on exposed extremities. Lymph: No cervical LAD. No supraclavicular LAD. M/S: No cyanosis. No joint deformity. No clubbing. Neuro: Awake. Moves all 4 extremities, non focal  Psych: Oriented x 3. No anxiety or agitation. Lab Data:  CBC:   Recent Labs     01/20/20  0000 01/21/20  0530   WBC 8.7 7.9   RBC 4.01 3.98*   HGB 12.2 12.0   HCT 37.4 37.2   MCV 93.0 93.5   RDW 13.8 13.7    224     BMP:   Recent Labs     01/20/20  0717 01/21/20  0530 01/21/20  1458 01/22/20  0607    135*  --  139   K 3.5 2.8* 3.3* 3.4*   CL 97* 92*  --  96*   CO2 28 29  --  28   PHOS 4.0  --   --   --    BUN 9 10  --  13   CREATININE 0.7 0.7  --  0.6     BNP: No results for input(s): BNP in the last 72 hours. PT/INR: No results for input(s): PROTIME, INR in the last 72 hours. APTT:No results for input(s): APTT in the last 72 hours. CARDIAC ENZYMES:   Recent Labs     01/20/20  0717 01/20/20  1252 01/20/20  1805   TROPONINI <0.01 <0.01 <0.01     FASTING LIPID PANEL:  Lab Results   Component Value Date    CHOL 129 08/26/2018    HDL 36 08/26/2018    TRIG 144 08/26/2018     LIVER PROFILE:   Recent Labs     01/20/20  0248   AST 25   ALT 11   BILITOT 1.2*   ALKPHOS 87       EKG:  I have reviewed the EKG with the following interpretation:   Atrial fibrillation with intraventricular conduction delay, left axis,diffuse Q waves, no acute ST segment changes concerning for acute ischemia         RADIOLOGY  XR CHEST PORTABLE   Final Result   Cardiomegaly and pulmonary vascular congestion.       Bilateral subsegmental atelectasis. No lobar consolidation.       Possible small left pleural effusion.       Inferior subluxation of the left humeral head in relation to the glenoid is   increased from the prior study.          Assessment & Plan: Patient Active Problem List    Diagnosis Date Noted    Heart failure ([facility] 28/73/8632    Metabolic encephalopathy     Acute cystitis with hematuria     Displaced fracture of distal phalanx of left great toe, initial encounter for closed fracture 10/16/2018    Acute on chronic diastolic congestive heart failure ([facility] 09/06/2018    Morbid obesity with BMI of 50.0-59.9, adult ([facility] 08/26/2018    CAD (coronary artery disease)     Atrial fibrillation (HCC)     Hemiplegia affecting nondominant side s/p CVA     Hyperlipidemia     DM2 (diabetes mellitus, type 2) ([facility] 10/14/2016    Paroxysmal atrial fibrillation ([facility] 10/27/2015    Essential hypertension 10/27/2015    Reactive depression 07/24/2015     Altered mental status  Acute metabolic encephalopathy  -In the setting of acute UTI  -Also with acute on chronic CHF with acute hypoxic respiratory failure  -Continue to monitor closely  Resolved      Hypoxia  -Suspect secondary to acute on chronic CHF  -Last echo with EF 60 to 65% with grade 3 diastolic dysfunction  -Strict I's and O's, daily weights  -IV Lasix 20 mg twice daily  -Continue IV diuretics  -Wean supplemental O2 as tolerated  Now off O2  ECHO from 7/98- diastolic CHF. Normal. EF.   No need for repeat ECHO now      Urinary tract infection  -UA with moderate leukocyte esterase,  white blood cells  -Urine culture no growth  -d/c  Rocephin     Hypokalemia--resolved  -Mild, 3.1 on admission  -Sliding-scale replacement     Chronic Atrial fibrillation  -On pradaxa  -EKG with atrial fibrillation, rate controlled  -Continue telemetry monitoring  -Continue home medications including digoxin, Toprol     DM2  -Glucose is well controlled  -Sliding-scale insulin      CAD  -Continue home medications  -No acute ACS symptoms     HTN  - BP is elevated  -Per ED note, was given sublingual nitro for significantly elevated blood pressures on arrival by EMS  - Continue home medication     L hemiplegia   Hx of

## 2020-01-22 NOTE — DISCHARGE INSTR - COC
Continuity of Care Form    Patient Name: Diana Venegas   :  1933  MRN:  [id number]    Admit date:  2020  Discharge date:  ***    Code Status Order: DNR-CCA   Advance Directives:   [facility] Documentation     Date/Time Healthcare Directive Type of Healthcare Directive Copy in [address] Agent's Name Healthcare Agent's Phone Number    20 [phone]  Yes, patient has an advance directive for healthcare treatment  Durable power of  for health care;Living will  Yes, copy in chart  Healthcare power of   Ean Holland  [phone]          Admitting Physician:  Diandra Pugh MD  PCP: Maksim Domingo MD    Discharging Nurse: Alvin Bill.  RN  [address] Unit/Room#: /0184-09  Discharging Unit Phone Number: [phone]    Emergency Contact:   Extended Emergency Contact Information  Primary Emergency Contact: [address] Phone: [phone]  Relation: Child  Secondary Emergency Contact: Catalina Fine  Address:  [address], [address] [facility] [address]  Phone: [phone]  Work Phone: [phone]  Mobile Phone: [phone]  Relation: Spouse    Past Surgical History:  Past Surgical History:   Procedure Laterality Date    CHOLECYSTECTOMY      CYSTOSCOPY  08/10/2017    DILATION AND CURETTAGE OF UTERUS  2016    HEMORRHOID SURGERY      KNEE ARTHROSCOPY      bilateral    SKIN BIOPSY         Immunization History:   Immunization History   Administered Date(s) Administered    Influenza Virus Vaccine 10/16/2012, 2014, 2014, 2015, 10/14/2016, 10/01/2019    Influenza Whole 10/16/2012, 2014, 2014    Influenza, High Dose (Fluzone 65 yrs and older) 10/14/2016, 2018    Pneumococcal Conjugate 13-valent (Riebgqd70) 2016    Pneumococcal Conjugate 7-valent (Prevnar7) 10/16/2012    Pneumococcal Polysaccharide (Mwcxucwtn38) 10/03/2007, 09/12/2012, 04/01/2017    Tdap (Boostrix, Adacel) 10/10/2018    Tetanus 05/01/1991    Tetanus Toxoid, absorbed 05/01/1991       Active Problems:  Patient Active Problem List   Diagnosis Code    Reactive depression F32.9    Paroxysmal atrial fibrillation ([facility]) I48.0    Essential hypertension I10    DM2 (diabetes mellitus, type 2) ([facility]) E11.9    CAD (coronary artery disease) I25.10    Atrial fibrillation ([facility]) I48.91    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.90    Hyperlipidemia E78.5    Morbid obesity with BMI of 50.0-59.9, adult ([facility]) E66.01, Z68.43    Acute on chronic diastolic congestive heart failure ([facility]) I50.33    Displaced fracture of distal phalanx of left great toe, initial encounter for closed fracture S92.422A    Heart failure ([facility]) V44.7    Metabolic encephalopathy I85.70    Acute cystitis with hematuria N30.01       Isolation/Infection:   Isolation          No Isolation        Patient Infection Status     None to display          Nurse Assessment:  Last Vital Signs: BP (!) 149/78   Pulse 90   Temp 98.1 °F (36.7 °C) (Oral)   Resp 19   Ht 5' 2\" (1.575 m)   Wt 257 lb 3.2 oz (116.7 kg)   SpO2 91%   BMI 47.04 kg/m²     Last documented pain score (0-10 scale):    Last Weight:   Wt Readings from Last 1 Encounters:   01/20/20 257 lb 3.2 oz (116.7 kg)     Mental Status:  alert    IV Access:  - None    Nursing Mobility/ADLs:  Walking   Dependent  Transfer  Dependent  Bathing  Dependent  Dressing  Dependent  Toileting  Dependent  Feeding  Assisted  Med Admin  Dependent  Med Delivery   whole    Wound Care Documentation and Therapy:  Incision 12/06/16 Vagina (Active)   Number of days: 1141        Elimination:  Continence:   · Bowel: ***  · Bladder: ***  Urinary Catheter: None   Colostomy/Ileostomy/Ileal Conduit: No       Date of Last BM: Unknown    Intake/Output Summary (Last 24 hours) at 1/22/2020 1142  Last data filed at 1/22/2020 0910  Gross per 24 hour   Intake 300 ml   Output 1550 ml   Net -1250 ml     I/O last 3 completed shifts: In: 120 [P.O.:120]  Out: 2550 [Urine:2550]    Safety Concerns: At Risk for Falls    Impairments/Disabilities:      Vision    Nutrition Therapy:  Current Nutrition Therapy:   - Oral Diet:  Low Sodium (2gm)/Carb Control Diet    Routes of Feeding: Oral  Liquids: 2000 mL fluid restriction per day  Daily Fluid Restriction: yes - amount 2000 mL  Last Modified Barium Swallow with Video (Video Swallowing Test): not done    Treatments at the Time of Hospital Discharge:   Respiratory Treatments: ***  Oxygen Therapy:  is not on home oxygen therapy. Ventilator:    - No ventilator support    Rehab Therapies: ***  Weight Bearing Status/Restrictions: Non weight-bearing  Other Medical Equipment (for information only, NOT a DME order):  wheelchair  Other Treatments: ***    Patient's personal belongings (please select all that are sent with patient):  None    RN SIGNATURE:  Electronically signed by Marshal Gallegos RN on 1/22/20 at 12:07 PM    CASE MANAGEMENT/SOCIAL WORK SECTION    Inpatient Status Date: ***    Readmission Risk Assessment Score:  Readmission Risk              Risk of Unplanned Readmission:        18           Discharging to Facility/ Agency   · Name:   · Address:  · Phone:  · Fax:    Dialysis Facility (if applicable)   · Name:  · Address:  · Dialysis Schedule:  · Phone:  · Fax:    / signature:     PHYSICIAN SECTION    Prognosis: Good    Condition at Discharge: [facility] Patient Condition:[id number]}    Rehab Potential (if transferring to Rehab): {Prognosis:0759255065}    Recommended Labs or Other Treatments After Discharge: ***    Physician Certification: I certify the above information and transfer of [place]  is necessary for the continuing treatment of the diagnosis listed and that she requires {Admit to Appropriate Level of Care:49031} for {GREATER/LESS:201111647} 30 days.      Update Admission H&P: {CHP DME Changes in JWZHU:447329270}    PHYSICIAN SIGNATURE:  {Esignature:[id number]}

## 2020-01-22 NOTE — CARE COORDINATION
DISCHARGE ORDER  Date/Time 2020 9:16 AM  Completed by: Phoebe De La Paz, Case Management    Patient Name: Dung Mack    : 1933  Admitting Diagnosis: Heart failure ([address] [I50.9]      Admit order Date and Status:2020 2310 inpt  (verify MD's last order for status of admission)        Noted discharge order. Confirmed discharge plan with patient / family (pt): Yes    If pt confirmed DC plan does family need to be contacted by CM No if yes who______  Discharge Plan: Reviewed chart. Role of discharge planner explained and patient verbalized understanding. Discharge order is noted. Pt is being d/c'd to home today. Pt's O2 sats are 91% on RA. Pt has a private duty HHA for 4 hours/ day, 5 days a week. Pt declines HHC. Pt states taht her spouse will pick her up to transport her home. No further discharge needs needed or noted. K+ 3.4, and /78 this AM.    Reviewed chart. Role of discharge planner explained and patient verbalized understanding. Discharge order is noted. Has Home O2 in place on admit:  No  Informed of need to bring portable home O2 tank on day of discharge for nursing to connect prior to leaving:   Not Indicated  Verbalized agreement/Understanding:   Not Indicated    Discharge timeout done with Curt Barraza RN. All discharge needs and concerns addressed.

## 2020-01-23 ENCOUNTER — TELEPHONE (OUTPATIENT)
Dept: INTERNAL MEDICINE CLINIC | Age: 85
End: 2020-01-23

## 2020-01-23 RX ORDER — SPIRONOLACTONE AND HYDROCHLOROTHIAZIDE 25; 25 MG/1; MG/1
1 TABLET ORAL DAILY
Qty: 90 TABLET | Refills: 0 | Status: SHIPPED | OUTPATIENT
Start: 2020-01-23 | End: 2020-01-24 | Stop reason: SDUPTHER

## 2020-01-23 NOTE — TELEPHONE ENCOUNTER
[facility] Transitions Initial Follow Up Call    Outreach made within 2 business days of discharge: Yes    Patient: Tracey Perdomo Patient : 1933   MRN: [id number]  Reason for Admission: There are no discharge diagnoses documented for the most recent discharge. Discharge Date: 20       Spoke with: Son    Discharge department/facility: [facility]     [facility] Interactive Patient Contact:  Was patient able to fill all prescriptions: Yes  Was patient instructed to bring all medications to the follow-up visit: Yes  Is patient taking all medications as directed in the discharge summary?  Yes  Does patient understand their discharge instructions: Yes  Does patient have questions or concerns that need addressed prior to 7-14 day follow up office visit: no    Scheduled appointment with PCP within 7-14 days    Follow Up  Future Appointments   Date Time Provider Martha Villegas   2020  4:10 PM MD [facility] None   2020 10:50 AM MD [facility] None       Edwige Gautam

## 2020-01-24 LAB
BLOOD CULTURE, ROUTINE: NORMAL
BLOOD CULTURE, ROUTINE: NORMAL

## 2020-01-24 RX ORDER — SPIRONOLACTONE AND HYDROCHLOROTHIAZIDE 25; 25 MG/1; MG/1
1 TABLET ORAL DAILY
Qty: 90 TABLET | Refills: 0 | Status: SHIPPED | OUTPATIENT
Start: 2020-01-24 | End: 2020-04-06

## 2020-01-28 ENCOUNTER — OFFICE VISIT (OUTPATIENT)
Dept: INTERNAL MEDICINE CLINIC | Age: 85
End: 2020-01-28

## 2020-01-28 VITALS — DIASTOLIC BLOOD PRESSURE: 75 MMHG | HEART RATE: 70 BPM | RESPIRATION RATE: 18 BRPM | SYSTOLIC BLOOD PRESSURE: 135 MMHG

## 2020-01-28 DIAGNOSIS — I50.41 ACUTE COMBINED SYSTOLIC AND DIASTOLIC CHF, NYHA CLASS 1 (HCC): ICD-10-CM

## 2020-01-28 LAB
ANION GAP SERPL CALCULATED.3IONS-SCNC: 16 MMOL/L (ref 3–16)
BUN BLDV-MCNC: 20 MG/DL (ref 7–20)
CALCIUM SERPL-MCNC: 9.9 MG/DL (ref 8.3–10.6)
CHLORIDE BLD-SCNC: 99 MMOL/L (ref 99–110)
CO2: 26 MMOL/L (ref 21–32)
CREAT SERPL-MCNC: 0.9 MG/DL (ref 0.6–1.2)
GFR AFRICAN AMERICAN: >60
GFR NON-AFRICAN AMERICAN: 59
GLUCOSE BLD-MCNC: 163 MG/DL (ref 70–99)
POTASSIUM SERPL-SCNC: 4.2 MMOL/L (ref 3.5–5.1)
SODIUM BLD-SCNC: 141 MMOL/L (ref 136–145)

## 2020-01-28 PROCEDURE — 1111F DSCHRG MED/CURRENT MED MERGE: CPT | Performed by: INTERNAL MEDICINE

## 2020-01-28 PROCEDURE — 99496 TRANSJ CARE MGMT HIGH F2F 7D: CPT | Performed by: INTERNAL MEDICINE

## 2020-01-28 ASSESSMENT — PATIENT HEALTH QUESTIONNAIRE - PHQ9
SUM OF ALL RESPONSES TO PHQ9 QUESTIONS 1 & 2: 1
1. LITTLE INTEREST OR PLEASURE IN DOING THINGS: 0
SUM OF ALL RESPONSES TO PHQ QUESTIONS 1-9: 1
2. FEELING DOWN, DEPRESSED OR HOPELESS: 1
SUM OF ALL RESPONSES TO PHQ QUESTIONS 1-9: 1

## 2020-01-28 NOTE — PROGRESS NOTES
levels       CVA -with left sided hemiparesis  thought to be embolic -from Afibb., Stable  No bleeding issues      Urinary incontinence - on toviaz      Depression -on lexapro. Plan to wean slowly   Dementia - with behavioral disturbance  - on seroquel at night     Morbid obesity- - unable to exercise with cva, ext weakness wheel chair bound status  Has vision issues and mild dementia      Pt with cva and hemiplegia, morbid obesity, risk for falls  And aspiration    Had flu and pna vaccines          Medical Decision Making: moderate complexity        On the basis of positive falls risk screening, assessment and plan is as follows: Elaina Sher

## 2020-02-05 RX ORDER — CEPHALEXIN 250 MG/1
CAPSULE ORAL
Qty: 90 CAPSULE | Refills: 0 | Status: SHIPPED | OUTPATIENT
Start: 2020-02-05 | End: 2020-05-05

## 2020-02-21 RX ORDER — SIMVASTATIN 20 MG
TABLET ORAL
Qty: 90 TABLET | Refills: 0 | Status: SHIPPED | OUTPATIENT
Start: 2020-02-21 | End: 2020-05-28

## 2020-02-25 RX ORDER — POTASSIUM CHLORIDE 20 MEQ/1
20 TABLET, EXTENDED RELEASE ORAL DAILY
Qty: 90 TABLET | Refills: 0 | Status: SHIPPED | OUTPATIENT
Start: 2020-02-25 | End: 2020-05-28

## 2020-02-28 RX ORDER — OMEPRAZOLE 40 MG/1
CAPSULE, DELAYED RELEASE ORAL
Qty: 90 CAPSULE | Refills: 0 | Status: SHIPPED | OUTPATIENT
Start: 2020-02-28 | End: 2020-05-28

## 2020-04-06 RX ORDER — SPIRONOLACTONE AND HYDROCHLOROTHIAZIDE 25; 25 MG/1; MG/1
TABLET ORAL
Qty: 90 TABLET | Refills: 0 | Status: SHIPPED | OUTPATIENT
Start: 2020-04-06 | End: 2020-06-24

## 2020-04-06 RX ORDER — FUROSEMIDE 40 MG/1
TABLET ORAL
Qty: 90 TABLET | Refills: 0 | Status: SHIPPED | OUTPATIENT
Start: 2020-04-06 | End: 2020-06-24

## 2020-04-06 RX ORDER — TOLTERODINE 2 MG/1
CAPSULE, EXTENDED RELEASE ORAL
Qty: 90 CAPSULE | Refills: 0 | Status: SHIPPED | OUTPATIENT
Start: 2020-04-06 | End: 2020-06-24

## 2020-04-06 RX ORDER — ESCITALOPRAM OXALATE 20 MG/1
TABLET ORAL
Qty: 90 TABLET | Refills: 0 | Status: SHIPPED | OUTPATIENT
Start: 2020-04-06 | End: 2020-06-24

## 2020-04-20 RX ORDER — BUTALBITAL, ACETAMINOPHEN, CAFFEINE AND CODEINE PHOSPHATE 300; 50; 40; 30 MG/1; MG/1; MG/1; MG/1
CAPSULE ORAL
Qty: 30 CAPSULE | Refills: 0 | Status: SHIPPED | OUTPATIENT
Start: 2020-04-20 | End: 2020-05-13

## 2020-04-20 RX ORDER — METOPROLOL SUCCINATE 50 MG/1
TABLET, EXTENDED RELEASE ORAL
Qty: 90 TABLET | Refills: 0 | Status: SHIPPED | OUTPATIENT
Start: 2020-04-20 | End: 2021-03-25

## 2020-05-05 RX ORDER — CEPHALEXIN 250 MG/1
CAPSULE ORAL
Qty: 90 CAPSULE | Refills: 0 | Status: SHIPPED | OUTPATIENT
Start: 2020-05-05 | End: 2020-07-24

## 2020-05-28 RX ORDER — POTASSIUM CHLORIDE 1500 MG/1
TABLET, EXTENDED RELEASE ORAL
Qty: 90 TABLET | Refills: 0 | Status: SHIPPED | OUTPATIENT
Start: 2020-05-28 | End: 2020-09-17

## 2020-05-28 RX ORDER — OMEPRAZOLE 40 MG/1
CAPSULE, DELAYED RELEASE ORAL
Qty: 90 CAPSULE | Refills: 0 | Status: SHIPPED | OUTPATIENT
Start: 2020-05-28 | End: 2020-08-31

## 2020-05-28 RX ORDER — SIMVASTATIN 20 MG
TABLET ORAL
Qty: 90 TABLET | Refills: 0 | Status: SHIPPED | OUTPATIENT
Start: 2020-05-28 | End: 2020-08-31

## 2020-06-24 ENCOUNTER — TELEPHONE (OUTPATIENT)
Dept: INTERNAL MEDICINE CLINIC | Age: 85
End: 2020-06-24

## 2020-06-24 RX ORDER — ESCITALOPRAM OXALATE 20 MG/1
TABLET ORAL
Qty: 90 TABLET | Refills: 0 | Status: SHIPPED | OUTPATIENT
Start: 2020-06-24 | End: 2020-10-09

## 2020-06-24 RX ORDER — TOLTERODINE 2 MG/1
CAPSULE, EXTENDED RELEASE ORAL
Qty: 90 CAPSULE | Refills: 0 | Status: SHIPPED | OUTPATIENT
Start: 2020-06-24 | End: 2020-11-09

## 2020-06-24 RX ORDER — QUETIAPINE FUMARATE 25 MG/1
TABLET, FILM COATED ORAL
Qty: 45 TABLET | Refills: 0 | Status: SHIPPED | OUTPATIENT
Start: 2020-06-24 | End: 2020-06-25 | Stop reason: ALTCHOICE

## 2020-06-24 RX ORDER — SPIRONOLACTONE AND HYDROCHLOROTHIAZIDE 25; 25 MG/1; MG/1
TABLET ORAL
Qty: 90 TABLET | Refills: 0 | Status: SHIPPED | OUTPATIENT
Start: 2020-06-24 | End: 2020-10-09

## 2020-06-24 RX ORDER — FUROSEMIDE 40 MG/1
TABLET ORAL
Qty: 90 TABLET | Refills: 0 | Status: SHIPPED | OUTPATIENT
Start: 2020-06-24 | End: 2020-09-17 | Stop reason: SDUPTHER

## 2020-06-24 NOTE — TELEPHONE ENCOUNTER
----- Message from Shabbir Villalobos MD sent at 6/24/2020 11:48 AM EDT -----  Contact: Pt's son Erik Maurer -[phone]  Call [facility] and ask if she is taking this med before or currently on  I need to know the dose  ----- Message -----  From: Krystyna Davies  Sent: 6/24/2020  11:14 AM EDT  To: Shabbir Villalobos MD      ----- Message -----  From: Carlin Gautam  Sent: 6/24/2020   9:43 AM EDT  To: Krystyna Davies    Pts son called back again stating that she has been more aggressive lately so he would like her QUEtiapine Fumarate called into [facility] instead of [facility] so they could get it sooner. He also wanted to clarify the amount she is supposed to be taking. He wasn't sure if the dosage was ever increased.     Pt's son Erik Maurer -[phone]

## 2020-06-25 ENCOUNTER — TELEPHONE (OUTPATIENT)
Dept: INTERNAL MEDICINE CLINIC | Age: 85
End: 2020-06-25

## 2020-06-25 RX ORDER — QUETIAPINE FUMARATE 50 MG/1
50 TABLET, FILM COATED ORAL 2 TIMES DAILY
Qty: 180 TABLET | Refills: 0 | Status: SHIPPED | OUTPATIENT
Start: 2020-06-25 | End: 2020-10-29 | Stop reason: SDUPTHER

## 2020-07-24 RX ORDER — CEPHALEXIN 250 MG/1
CAPSULE ORAL
Qty: 90 CAPSULE | Refills: 0 | Status: SHIPPED | OUTPATIENT
Start: 2020-07-24 | End: 2020-10-16

## 2020-08-12 RX ORDER — APIXABAN 5 MG/1
TABLET, FILM COATED ORAL
Qty: 180 TABLET | Refills: 0 | Status: SHIPPED | OUTPATIENT
Start: 2020-08-12 | End: 2020-11-09

## 2020-09-01 RX ORDER — SIMVASTATIN 20 MG
TABLET ORAL
Qty: 90 TABLET | Refills: 0 | Status: SHIPPED | OUTPATIENT
Start: 2020-09-01 | End: 2020-11-30

## 2020-09-01 RX ORDER — OMEPRAZOLE 40 MG/1
CAPSULE, DELAYED RELEASE ORAL
Qty: 90 CAPSULE | Refills: 0 | Status: SHIPPED | OUTPATIENT
Start: 2020-09-01 | End: 2020-11-30

## 2020-09-08 NOTE — PROGRESS NOTES
4 Eyes Skin Assessment     The patient is being assess for   Admission    I agree that 2 RN's have performed a thorough Head to Toe Skin Assessment on the patient. ALL assessment sites listed below have been assessed. Areas assessed by both nurses: No skin issues on admission  [x]   Head, Face, and Ears   [x]   Shoulders, Back, and Chest, Abdomen  [x]   Arms, Elbows, and Hands   [x]   Coccyx, Sacrum, and Ischium  [x]   Legs, Feet, and Heels            **SHARE this note so that the co-signing nurse is able to place an eSignature**    Co-signer eSignature: Electronically signed by Wander Contreras RN on 8/26/18 at 5:36 AM    Does the Patient have Skin Breakdown?   No          Bong Prevention initiated:  Yes   Wound Care Orders initiated:  NA      [facility] nurse consulted for Pressure Injury (Stage 3,4, Unstageable, DTI, NWPT, Complex wounds)and New or Established Ostomies:  NA      Primary Nurse eSignature: Electronically signed by Cynthia Wills RN on 8/26/18 at 4:11 AM Patient to take an additional metoprolol 50 mg for AF episodes,   If episodes persist, please call back to schedule virtual visit with provider  Waqas with above information

## 2020-09-17 RX ORDER — POTASSIUM CHLORIDE 1500 MG/1
TABLET, EXTENDED RELEASE ORAL
Qty: 90 TABLET | Refills: 0 | Status: SHIPPED | OUTPATIENT
Start: 2020-09-17 | End: 2021-01-07

## 2020-09-17 RX ORDER — FUROSEMIDE 40 MG/1
TABLET ORAL
Qty: 90 TABLET | Refills: 0 | Status: SHIPPED | OUTPATIENT
Start: 2020-09-17 | End: 2021-01-07

## 2020-09-17 RX ORDER — DIGOXIN 125 MCG
125 TABLET ORAL DAILY
Qty: 90 TABLET | Refills: 0 | Status: SHIPPED | OUTPATIENT
Start: 2020-09-17 | End: 2020-11-30

## 2020-10-09 RX ORDER — SPIRONOLACTONE AND HYDROCHLOROTHIAZIDE 25; 25 MG/1; MG/1
TABLET ORAL
Qty: 90 TABLET | Refills: 0 | Status: SHIPPED | OUTPATIENT
Start: 2020-10-09 | End: 2021-01-07

## 2020-10-09 RX ORDER — ESCITALOPRAM OXALATE 20 MG/1
TABLET ORAL
Qty: 90 TABLET | Refills: 0 | Status: SHIPPED | OUTPATIENT
Start: 2020-10-09 | End: 2021-01-07

## 2020-10-16 RX ORDER — CEPHALEXIN 250 MG/1
CAPSULE ORAL
Qty: 90 CAPSULE | Refills: 0 | Status: SHIPPED | OUTPATIENT
Start: 2020-10-16 | End: 2021-01-07

## 2020-10-29 ENCOUNTER — OFFICE VISIT (OUTPATIENT)
Dept: INTERNAL MEDICINE CLINIC | Age: 85
End: 2020-10-29

## 2020-10-29 VITALS
HEART RATE: 70 BPM | BODY MASS INDEX: 47.04 KG/M2 | RESPIRATION RATE: 18 BRPM | HEIGHT: 62 IN | TEMPERATURE: 97.2 F | SYSTOLIC BLOOD PRESSURE: 142 MMHG | DIASTOLIC BLOOD PRESSURE: 75 MMHG

## 2020-10-29 DIAGNOSIS — R39.9 UTI SYMPTOMS: ICD-10-CM

## 2020-10-29 DIAGNOSIS — I48.0 PAROXYSMAL ATRIAL FIBRILLATION (HCC): ICD-10-CM

## 2020-10-29 DIAGNOSIS — I25.10 CORONARY ARTERY DISEASE INVOLVING NATIVE CORONARY ARTERY OF NATIVE HEART WITHOUT ANGINA PECTORIS: ICD-10-CM

## 2020-10-29 DIAGNOSIS — F33.0 MILD EPISODE OF RECURRENT MAJOR DEPRESSIVE DISORDER (HCC): ICD-10-CM

## 2020-10-29 DIAGNOSIS — E11.9 TYPE 2 DIABETES MELLITUS WITHOUT COMPLICATION, WITHOUT LONG-TERM CURRENT USE OF INSULIN (HCC): Primary | ICD-10-CM

## 2020-10-29 DIAGNOSIS — E11.9 TYPE 2 DIABETES MELLITUS WITHOUT COMPLICATION, WITHOUT LONG-TERM CURRENT USE OF INSULIN (HCC): ICD-10-CM

## 2020-10-29 DIAGNOSIS — F02.818 LATE ONSET ALZHEIMER'S DISEASE WITH BEHAVIORAL DISTURBANCE (HCC): ICD-10-CM

## 2020-10-29 DIAGNOSIS — I50.32 CHRONIC DIASTOLIC CHF (CONGESTIVE HEART FAILURE) (HCC): ICD-10-CM

## 2020-10-29 DIAGNOSIS — G30.1 LATE ONSET ALZHEIMER'S DISEASE WITH BEHAVIORAL DISTURBANCE (HCC): ICD-10-CM

## 2020-10-29 LAB
BASOPHILS ABSOLUTE: 0.1 K/UL (ref 0–0.2)
BASOPHILS RELATIVE PERCENT: 1 %
BILIRUBIN, POC: NORMAL
BLOOD URINE, POC: NORMAL
CLARITY, POC: NORMAL
COLOR, POC: NORMAL
EOSINOPHILS ABSOLUTE: 0.4 K/UL (ref 0–0.6)
EOSINOPHILS RELATIVE PERCENT: 6.1 %
GLUCOSE URINE, POC: NORMAL
HCT VFR BLD CALC: 42.8 % (ref 36–48)
HEMOGLOBIN: 14.3 G/DL (ref 12–16)
KETONES, POC: NORMAL
LEUKOCYTE EST, POC: NORMAL
LYMPHOCYTES ABSOLUTE: 2.5 K/UL (ref 1–5.1)
LYMPHOCYTES RELATIVE PERCENT: 35.2 %
MCH RBC QN AUTO: 30.2 PG (ref 26–34)
MCHC RBC AUTO-ENTMCNC: 33.4 G/DL (ref 31–36)
MCV RBC AUTO: 90.6 FL (ref 80–100)
MONOCYTES ABSOLUTE: 0.6 K/UL (ref 0–1.3)
MONOCYTES RELATIVE PERCENT: 8.4 %
NEUTROPHILS ABSOLUTE: 3.4 K/UL (ref 1.7–7.7)
NEUTROPHILS RELATIVE PERCENT: 49.3 %
NITRITE, POC: NORMAL
PDW BLD-RTO: 13.5 % (ref 12.4–15.4)
PH, POC: NORMAL
PLATELET # BLD: 276 K/UL (ref 135–450)
PMV BLD AUTO: 7.8 FL (ref 5–10.5)
PROTEIN, POC: NORMAL
RBC # BLD: 4.73 M/UL (ref 4–5.2)
SPECIFIC GRAVITY, POC: NORMAL
UROBILINOGEN, POC: NORMAL
WBC # BLD: 7 K/UL (ref 4–11)

## 2020-10-29 PROCEDURE — 99212 OFFICE O/P EST SF 10 MIN: CPT | Performed by: INTERNAL MEDICINE

## 2020-10-29 PROCEDURE — G8427 DOCREV CUR MEDS BY ELIG CLIN: HCPCS | Performed by: INTERNAL MEDICINE

## 2020-10-29 PROCEDURE — G8417 CALC BMI ABV UP PARAM F/U: HCPCS | Performed by: INTERNAL MEDICINE

## 2020-10-29 PROCEDURE — 1036F TOBACCO NON-USER: CPT | Performed by: INTERNAL MEDICINE

## 2020-10-29 PROCEDURE — 1123F ACP DISCUSS/DSCN MKR DOCD: CPT | Performed by: INTERNAL MEDICINE

## 2020-10-29 PROCEDURE — 81002 URINALYSIS NONAUTO W/O SCOPE: CPT | Performed by: INTERNAL MEDICINE

## 2020-10-29 PROCEDURE — 4040F PNEUMOC VAC/ADMIN/RCVD: CPT | Performed by: INTERNAL MEDICINE

## 2020-10-29 PROCEDURE — G8484 FLU IMMUNIZE NO ADMIN: HCPCS | Performed by: INTERNAL MEDICINE

## 2020-10-29 PROCEDURE — 1090F PRES/ABSN URINE INCON ASSESS: CPT | Performed by: INTERNAL MEDICINE

## 2020-10-29 RX ORDER — QUETIAPINE FUMARATE 50 MG/1
50 TABLET, FILM COATED ORAL 2 TIMES DAILY
Qty: 180 TABLET | Refills: 0 | Status: SHIPPED | OUTPATIENT
Start: 2020-10-29 | End: 2021-04-14
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
## 2020-10-30 ENCOUNTER — TELEPHONE (OUTPATIENT)
Dept: INTERNAL MEDICINE CLINIC | Age: 85
End: 2020-10-30

## 2020-10-30 DIAGNOSIS — E11.9 TYPE 2 DIABETES MELLITUS WITHOUT COMPLICATION, WITHOUT LONG-TERM CURRENT USE OF INSULIN (HCC): Chronic | ICD-10-CM

## 2020-10-30 LAB
A/G RATIO: 1.4 (ref 1.1–2.2)
ALBUMIN SERPL-MCNC: 4 G/DL (ref 3.4–5)
ALP BLD-CCNC: 90 U/L (ref 40–129)
ALT SERPL-CCNC: 18 U/L (ref 10–40)
ANION GAP SERPL CALCULATED.3IONS-SCNC: 12 MMOL/L (ref 3–16)
AST SERPL-CCNC: 19 U/L (ref 15–37)
BILIRUB SERPL-MCNC: 0.7 MG/DL (ref 0–1)
BUN BLDV-MCNC: 20 MG/DL (ref 7–20)
CALCIUM SERPL-MCNC: 9.8 MG/DL (ref 8.3–10.6)
CHLORIDE BLD-SCNC: 93 MMOL/L (ref 99–110)
CHOLESTEROL, FASTING: 174 MG/DL (ref 0–199)
CO2: 30 MMOL/L (ref 21–32)
CREAT SERPL-MCNC: 0.8 MG/DL (ref 0.6–1.2)
DIGOXIN LEVEL: 1.3 NG/ML (ref 0.8–2)
ESTIMATED AVERAGE GLUCOSE: 151.3 MG/DL
GFR AFRICAN AMERICAN: >60
GFR NON-AFRICAN AMERICAN: >60
GLOBULIN: 2.8 G/DL
GLUCOSE BLD-MCNC: 138 MG/DL (ref 70–99)
HBA1C MFR BLD: 6.9 %
HDLC SERPL-MCNC: 43 MG/DL (ref 40–60)
LDL CHOLESTEROL CALCULATED: 76 MG/DL
POTASSIUM SERPL-SCNC: 4 MMOL/L (ref 3.5–5.1)
SODIUM BLD-SCNC: 135 MMOL/L (ref 136–145)
TOTAL PROTEIN: 6.8 G/DL (ref 6.4–8.2)
TRIGLYCERIDE, FASTING: 273 MG/DL (ref 0–150)
TSH REFLEX: 1.68 UIU/ML (ref 0.27–4.2)
VLDLC SERPL CALC-MCNC: 55 MG/DL

## 2020-10-30 RX ORDER — BUTALBITAL, ACETAMINOPHEN AND CAFFEINE 50; 325; 40 MG/1; MG/1; MG/1
1 TABLET ORAL DAILY PRN
Qty: 30 TABLET | Refills: 2 | Status: SHIPPED | OUTPATIENT
Start: 2020-10-30 | End: 2020-12-04 | Stop reason: SDUPTHER

## 2020-10-30 NOTE — TELEPHONE ENCOUNTER
----- Message from Juliet Frye MD sent at 10/30/2020  8:11 AM EDT -----  Lipids good, TGL high with non fasting sample  Renal liver cbc, good  Need A1c  Thyroid good

## 2020-10-31 LAB
ORGANISM: ABNORMAL
URINE CULTURE, ROUTINE: ABNORMAL

## 2020-11-09 RX ORDER — APIXABAN 5 MG/1
TABLET, FILM COATED ORAL
Qty: 180 TABLET | Refills: 0 | Status: SHIPPED | OUTPATIENT
Start: 2020-11-09 | End: 2020-11-17 | Stop reason: SDUPTHER

## 2020-11-09 RX ORDER — TOLTERODINE 2 MG/1
CAPSULE, EXTENDED RELEASE ORAL
Qty: 90 CAPSULE | Refills: 0 | Status: SHIPPED | OUTPATIENT
Start: 2020-11-09 | End: 2020-11-17 | Stop reason: SDUPTHER

## 2020-11-17 ENCOUNTER — TELEPHONE (OUTPATIENT)
Dept: INTERNAL MEDICINE CLINIC | Age: 85
End: 2020-11-17

## 2020-11-17 RX ORDER — TOLTERODINE 2 MG/1
CAPSULE, EXTENDED RELEASE ORAL
Qty: 90 CAPSULE | Refills: 0 | Status: SHIPPED | OUTPATIENT
Start: 2020-11-17 | End: 2021-04-12

## 2020-11-17 NOTE — TELEPHONE ENCOUNTER
----- Message from Danika Sifuentes sent at 11/17/2020 11:42 AM EST -----  Contact: [address] needs new prescription to refill Eliquis  and Tolterodine Tartrate.     Verbal: [phone]  Fax: [phone]

## 2020-11-30 RX ORDER — SIMVASTATIN 20 MG
TABLET ORAL
Qty: 90 TABLET | Refills: 0 | Status: SHIPPED | OUTPATIENT
Start: 2020-11-30 | End: 2021-02-08

## 2020-11-30 RX ORDER — DIGOXIN 125 MCG
TABLET ORAL
Qty: 90 TABLET | Refills: 0 | Status: SHIPPED | OUTPATIENT
Start: 2020-11-30 | End: 2021-02-08

## 2020-11-30 RX ORDER — OMEPRAZOLE 40 MG/1
CAPSULE, DELAYED RELEASE ORAL
Qty: 90 CAPSULE | Refills: 0 | Status: SHIPPED | OUTPATIENT
Start: 2020-11-30 | End: 2021-02-08

## 2020-12-07 RX ORDER — BUTALBITAL, ACETAMINOPHEN AND CAFFEINE 50; 325; 40 MG/1; MG/1; MG/1
1 TABLET ORAL DAILY PRN
Qty: 90 TABLET | Refills: 0 | Status: SHIPPED | OUTPATIENT
Start: 2020-12-07

## 2021-01-07 RX ORDER — FUROSEMIDE 40 MG/1
TABLET ORAL
Qty: 90 TABLET | Refills: 0 | Status: SHIPPED | OUTPATIENT
Start: 2021-01-07 | End: 2021-03-25 | Stop reason: SDUPTHER

## 2021-01-07 RX ORDER — POTASSIUM CHLORIDE 1500 MG/1
TABLET, EXTENDED RELEASE ORAL
Qty: 90 TABLET | Refills: 0 | Status: SHIPPED | OUTPATIENT
Start: 2021-01-07 | End: 2021-04-26 | Stop reason: SDUPTHER

## 2021-01-07 RX ORDER — CEPHALEXIN 250 MG/1
CAPSULE ORAL
Qty: 90 CAPSULE | Refills: 0 | Status: SHIPPED | OUTPATIENT
Start: 2021-01-07 | End: 2021-04-12

## 2021-01-07 RX ORDER — ESCITALOPRAM OXALATE 20 MG/1
TABLET ORAL
Qty: 90 TABLET | Refills: 0 | Status: SHIPPED | OUTPATIENT
Start: 2021-01-07 | End: 2021-03-25

## 2021-01-07 RX ORDER — SPIRONOLACTONE AND HYDROCHLOROTHIAZIDE 25; 25 MG/1; MG/1
TABLET ORAL
Qty: 90 TABLET | Refills: 0 | Status: SHIPPED | OUTPATIENT
Start: 2021-01-07 | End: 2021-03-25

## 2021-02-08 RX ORDER — DIGOXIN 125 MCG
TABLET ORAL
Qty: 90 TABLET | Refills: 0 | Status: SHIPPED | OUTPATIENT
Start: 2021-02-08 | End: 2021-04-30

## 2021-02-08 RX ORDER — SIMVASTATIN 20 MG
TABLET ORAL
Qty: 90 TABLET | Refills: 0 | Status: SHIPPED | OUTPATIENT
Start: 2021-02-08 | End: 2021-04-30

## 2021-02-08 RX ORDER — OMEPRAZOLE 40 MG/1
CAPSULE, DELAYED RELEASE ORAL
Qty: 90 CAPSULE | Refills: 0 | Status: SHIPPED | OUTPATIENT
Start: 2021-02-08 | End: 2021-04-30

## 2021-03-05 ENCOUNTER — IMMUNIZATION (OUTPATIENT)
Dept: PRIMARY CARE CLINIC | Age: 86
End: 2021-03-05
Payer: MEDICARE

## 2021-03-05 PROCEDURE — 0001A COVID-19, PFIZER VACCINE 30MCG/0.3ML DOSE: CPT | Performed by: FAMILY MEDICINE

## 2021-03-05 PROCEDURE — 91300 COVID-19, PFIZER VACCINE 30MCG/0.3ML DOSE: CPT | Performed by: FAMILY MEDICINE

## 2021-03-25 RX ORDER — SPIRONOLACTONE AND HYDROCHLOROTHIAZIDE 25; 25 MG/1; MG/1
TABLET ORAL
Qty: 90 TABLET | Refills: 0 | Status: SHIPPED | OUTPATIENT
Start: 2021-03-25 | End: 2021-06-17

## 2021-03-25 RX ORDER — FUROSEMIDE 40 MG/1
TABLET ORAL
Qty: 90 TABLET | Refills: 0 | Status: SHIPPED | OUTPATIENT
Start: 2021-03-25 | End: 2021-06-17

## 2021-03-25 RX ORDER — ESCITALOPRAM OXALATE 20 MG/1
TABLET ORAL
Qty: 90 TABLET | Refills: 0 | Status: SHIPPED | OUTPATIENT
Start: 2021-03-25 | End: 2021-06-17

## 2021-03-25 RX ORDER — METOPROLOL SUCCINATE 50 MG/1
TABLET, EXTENDED RELEASE ORAL
Qty: 90 TABLET | Refills: 0 | Status: SHIPPED | OUTPATIENT
Start: 2021-03-25 | End: 2021-06-17

## 2021-03-26 ENCOUNTER — IMMUNIZATION (OUTPATIENT)
Dept: PRIMARY CARE CLINIC | Age: 86
End: 2021-03-26
Payer: MEDICARE

## 2021-03-26 PROCEDURE — 91300 COVID-19, PFIZER VACCINE 30MCG/0.3ML DOSE: CPT | Performed by: FAMILY MEDICINE

## 2021-03-26 PROCEDURE — 0002A COVID-19, PFIZER VACCINE 30MCG/0.3ML DOSE: CPT | Performed by: FAMILY MEDICINE

## 2021-04-12 RX ORDER — CEPHALEXIN 250 MG/1
CAPSULE ORAL
Qty: 90 CAPSULE | Refills: 0 | Status: SHIPPED | OUTPATIENT
Start: 2021-04-12 | End: 2021-07-16

## 2021-04-12 RX ORDER — TOLTERODINE 2 MG/1
CAPSULE, EXTENDED RELEASE ORAL
Qty: 90 CAPSULE | Refills: 0 | Status: SHIPPED | OUTPATIENT
Start: 2021-04-12 | End: 2021-05-04 | Stop reason: ALTCHOICE

## 2021-04-14 DIAGNOSIS — F02.818 LATE ONSET ALZHEIMER'S DISEASE WITH BEHAVIORAL DISTURBANCE (HCC): ICD-10-CM

## 2021-04-14 DIAGNOSIS — G30.1 LATE ONSET ALZHEIMER'S DISEASE WITH BEHAVIORAL DISTURBANCE (HCC): ICD-10-CM

## 2021-04-14 RX ORDER — QUETIAPINE FUMARATE 50 MG/1
TABLET, FILM COATED ORAL
Qty: 180 TABLET | Refills: 0 | Status: SHIPPED | OUTPATIENT
Start: 2021-04-14 | End: 2021-07-06

## 2021-04-26 RX ORDER — POTASSIUM CHLORIDE 20 MEQ/1
TABLET, EXTENDED RELEASE ORAL
Qty: 90 TABLET | Refills: 0 | Status: SHIPPED | OUTPATIENT
Start: 2021-04-26 | End: 2021-07-06

## 2021-04-30 RX ORDER — DIGOXIN 125 MCG
TABLET ORAL
Qty: 90 TABLET | Refills: 0 | Status: ON HOLD | OUTPATIENT
Start: 2021-04-30 | End: 2021-08-17 | Stop reason: HOSPADM

## 2021-04-30 RX ORDER — SIMVASTATIN 20 MG
TABLET ORAL
Qty: 90 TABLET | Refills: 0 | Status: ON HOLD | OUTPATIENT
Start: 2021-04-30 | End: 2021-08-16

## 2021-04-30 RX ORDER — OMEPRAZOLE 40 MG/1
CAPSULE, DELAYED RELEASE ORAL
Qty: 90 CAPSULE | Refills: 0 | Status: ON HOLD | OUTPATIENT
Start: 2021-04-30 | End: 2021-08-16

## 2021-05-04 ENCOUNTER — OFFICE VISIT (OUTPATIENT)
Dept: INTERNAL MEDICINE CLINIC | Age: 86
End: 2021-05-04

## 2021-05-04 VITALS
TEMPERATURE: 97.5 F | SYSTOLIC BLOOD PRESSURE: 140 MMHG | HEART RATE: 70 BPM | DIASTOLIC BLOOD PRESSURE: 75 MMHG | RESPIRATION RATE: 18 BRPM

## 2021-05-04 DIAGNOSIS — Z00.00 MEDICARE ANNUAL WELLNESS VISIT, SUBSEQUENT: Primary | ICD-10-CM

## 2021-05-04 DIAGNOSIS — G30.1 LATE ONSET ALZHEIMER'S DISEASE WITH BEHAVIORAL DISTURBANCE (HCC): ICD-10-CM

## 2021-05-04 DIAGNOSIS — I50.32 CHRONIC DIASTOLIC CHF (CONGESTIVE HEART FAILURE) (HCC): ICD-10-CM

## 2021-05-04 DIAGNOSIS — Z00.00 ROUTINE GENERAL MEDICAL EXAMINATION AT A HEALTH CARE FACILITY: ICD-10-CM

## 2021-05-04 DIAGNOSIS — F33.0 MILD EPISODE OF RECURRENT MAJOR DEPRESSIVE DISORDER (HCC): ICD-10-CM

## 2021-05-04 DIAGNOSIS — F02.818 LATE ONSET ALZHEIMER'S DISEASE WITH BEHAVIORAL DISTURBANCE (HCC): ICD-10-CM

## 2021-05-04 DIAGNOSIS — I48.0 PAROXYSMAL ATRIAL FIBRILLATION (HCC): ICD-10-CM

## 2021-05-04 DIAGNOSIS — E11.9 TYPE 2 DIABETES MELLITUS WITHOUT COMPLICATION, WITHOUT LONG-TERM CURRENT USE OF INSULIN (HCC): ICD-10-CM

## 2021-05-04 DIAGNOSIS — I25.10 CORONARY ARTERY DISEASE INVOLVING NATIVE CORONARY ARTERY OF NATIVE HEART WITHOUT ANGINA PECTORIS: ICD-10-CM

## 2021-05-04 DIAGNOSIS — Z71.89 ACP (ADVANCE CARE PLANNING): ICD-10-CM

## 2021-05-04 PROCEDURE — G0439 PPPS, SUBSEQ VISIT: HCPCS | Performed by: INTERNAL MEDICINE

## 2021-05-04 PROCEDURE — 4040F PNEUMOC VAC/ADMIN/RCVD: CPT | Performed by: INTERNAL MEDICINE

## 2021-05-04 PROCEDURE — 1123F ACP DISCUSS/DSCN MKR DOCD: CPT | Performed by: INTERNAL MEDICINE

## 2021-05-04 RX ORDER — BENZONATATE 100 MG/1
100 CAPSULE ORAL 3 TIMES DAILY PRN
Qty: 30 CAPSULE | Refills: 0 | Status: SHIPPED | OUTPATIENT
Start: 2021-05-04 | End: 2021-05-14

## 2021-05-04 ASSESSMENT — PATIENT HEALTH QUESTIONNAIRE - PHQ9
SUM OF ALL RESPONSES TO PHQ QUESTIONS 1-9: 1
SUM OF ALL RESPONSES TO PHQ9 QUESTIONS 1 & 2: 4
2. FEELING DOWN, DEPRESSED OR HOPELESS: 3
SUM OF ALL RESPONSES TO PHQ QUESTIONS 1-9: 4
SUM OF ALL RESPONSES TO PHQ9 QUESTIONS 1 & 2: 1
1. LITTLE INTEREST OR PLEASURE IN DOING THINGS: 1
SUM OF ALL RESPONSES TO PHQ QUESTIONS 1-9: 4

## 2021-05-04 NOTE — PATIENT INSTRUCTIONS
Learning About [facility]  What is a living will? A living will, also called a declaration, is a legal form. It tells your family and your doctor your wishes when you can't speak for yourself. It's used by the health professionals who will treat you as you near the end of your life or if you get seriously hurt or ill. If you put your wishes in writing, your loved ones and others will know what kind of care you want. They won't need to guess. This can ease your mind and be helpful to others. And you can change or cancel your living will at any time. A living will is not the same as an estate or property will. An estate will explains what you want to happen with your money and property after you die. How do you use it? A living will is used to describe the kinds of treatment or life support you want as you near the end of your life or if you get seriously hurt or ill. Keep these facts in mind about living escobedo. · Your living will is used only if you can't speak or make decisions for yourself. Most often, one or more doctors must certify that you can't speak or decide for yourself before your living will takes effect. · If you get better and can speak for yourself again, you can accept or refuse any treatment. It doesn't matter what you said in your living will. · Some states may limit your right to refuse treatment in certain cases. For example, you may need to clearly state in your living will that you don't want artificial hydration and nutrition, such as being fed through a tube. Is a living will a legal document? A living will is a legal document. Each state has its own laws about living escobedo. And a living will may be called something else in your state. Here are some things to know about living escobedo. · You don't need an  to complete a living will. But legal advice can be helpful if your state's laws are unclear.  It can also help if your health history is complicated or your family can't agree on what should be in your living will. · You can change your living will at any time. Some people find that their wishes about end-of-life care change as their health changes. If you make big changes to your living will, complete a new form. · If you move to another state, make sure that your living will is legal in the state where you now live. In most cases, doctors will respect your wishes even if you have a form from a different state. · You might use a universal form that has been approved by many states. This kind of form can sometimes be filled out and stored online. Your digital copy will then be available wherever you have a connection to the internet. The doctors and nurses who need to treat you can find it right away. · Your state may offer an online registry. This is another place where you can store your living will online. · It's a good idea to get your living will notarized. This means using a person called a  to watch two people sign, or witness, your living will. What should you know when you create a living will? Here are some questions to ask yourself as you make your living will:  · Do you know enough about life support methods that might be used? If not, talk to your doctor so you know what might be done if you can't breathe on your own, your heart stops, or you can't swallow. · What things would you still want to be able to do after you receive life-support methods? Would you want to be able to walk? To speak? To eat on your own? To live without the help of machines? · Do you want certain Roman [employer] practices performed if you become very ill? · If you have a choice, where do you want to be cared for? In your home? At a hospital or nursing home? · If you have a choice at the end of your life, where would you prefer to die? At home? In a hospital or nursing home? Somewhere else? · Would you prefer to be buried or cremated?   · Do you want your organs to be donated after you die? What should you do with your living will? · Make sure that your family members and your health care agent have copies of your living will (also called a declaration). · Give your doctor a copy of your living will. Ask him or her to keep it as part of your medical record. If you have more than one doctor, make sure that each one has a copy. · Put a copy of your living will where it can be easily found. For example, some people may put a copy on their refrigerator door. If you are using a digital copy, be sure your doctor, family members, and health care agent know how to find and access it. Where can you learn more? Go to [email or web address] org and sign in to your [employer] account. Enter T551 in the [employer] box to learn more about \"Learning About Living [place]roven[place]. \"     If you do not have an account, please click on the \"Sign Up Now\" link. Current as of: July 17, 2020               Content Version: 12.8  © 4047-2383 [employer]wise, [employer]. Care instructions adapted under license by [facility] ([facility]). If you have questions about a medical condition or this instruction, always ask your healthcare professional. [place] [address] any warranty or liability for your use of this information. Personalized Preventive Plan for [place] - 5/4/2021  Medicare offers a range of preventive health benefits. Some of the tests and screenings are paid in full while other may be subject to a deductible, co-insurance, and/or copay. Some of these benefits include a comprehensive review of your medical history including lifestyle, illnesses that may run in your family, and various assessments and screenings as appropriate. After reviewing your medical record and screening and assessments performed today your provider may have ordered immunizations, labs, imaging, and/or referrals for you.   A list of these orders (if applicable) as well as your Preventive Care list are included within your After Visit Summary for your review. Other Preventive Recommendations:    · A preventive eye exam performed by an eye specialist is recommended every 1-2 years to screen for glaucoma; cataracts, macular degeneration, and other eye disorders. · A preventive dental visit is recommended every 6 months. · Try to get at least 150 minutes of exercise per week or 10,000 steps per day on a pedometer . · Order or download the FREE \"Exercise & Physical Activity: Your Everyday Guide\" from [employer]. Call 2-929.556.1878 or search [employer] online. · You need 2756-1404 mg of calcium and 6065-2768 IU of vitamin D per day. It is possible to meet your calcium requirement with diet alone, but a vitamin D supplement is usually necessary to meet this goal.  · When exposed to the sun, use a sunscreen that protects against both UVA and UVB radiation with an SPF of 30 or greater. Reapply every 2 to 3 hours or after sweating, drying off with a towel, or swimming. · Always wear a seat belt when traveling in a car. Always wear a helmet when riding a bicycle or motorcycle.

## 2021-05-04 NOTE — PROGRESS NOTES
Medicare Annual Wellness Visit  Name: Marcos Silver Date: 2021   MRN: [id number] Sex: Female   Age: 80 y.o. Ethnicity: Non-/Non    : 1933 Race: Enrico Gibbons is here for No chief complaint on file. Screenings for behavioral, psychosocial and functional/safety risks, and cognitive dysfunction are all negative except as indicated below. These results, as well as other patient data from the [address] form, are documented in Flowsheets linked to this Encounter. 80 y.o. Sueellen Payment female  with known history of GI Bleed, htn, hyperlipidemia, A Fib, CVA with hemiparalysis lt side, dementia and GERD here for annual f.w     Since last time, pt doing well. No falls  Memory and activitiy remains poor   No recent abx changes     Afibb - chronic with h.o ;left hemiplegia  = was on coumadin . changed to eliquis and doing well with no bleeding     No recent falls  Remains wheel chair bound   provides most care      DM- 2 - now on metformin with no side effects. Fasting sugars at 120-130. Not checking daily  No low sugars. Depression /anxiety - currently on lexapro. Family now worried about pt short temper status and getting easily agitated at home. urinary incontinence- Erlene Plane working better than oxybutnin. Still with occasional recurrent UTI . Last cysto with no acute findings       No fevers. No chills. No nausea or vomiting . No sob. No falls. No new unilateral weakness       Has right shoulder pain and arm pain  - chronic with limited activity    Progressive dementia with agitation at night per family , now on seroquel          Allergies   Allergen Reactions    Tape Sarah Ramos Tape]     Erythromycin Nausea And Vomiting         Prior to Visit Medications    Medication Sig Taking?  Authorizing Provider   omeprazole (PRILOSEC) 40 MG delayed release capsule TAKE 1 CAPSULE DAILY  Char Ochoa MD   digoxin (LANOXIN) 125 MCG tablet TAKE 1 TABLET DAILY  Babs Lowry MD   apixaban (ELIQUIS) 5 MG TABS tablet TAKE 1 TABLET TWICE A DAY  Babs Lowry MD   simvastatin (ZOCOR) 20 MG tablet TAKE 1 TABLET NIGHTLY  Babs Lowry MD   potassium chloride (KLOR-CON M20) 20 MEQ extended release tablet TAKE 1 TABLET DAILY  Babs Lowry MD   QUEtiapine (SEROQUEL) 50 MG tablet TAKE 1 TABLET TWICE A DAY  Babs Lowry MD   metFORMIN (GLUCOPHAGE) 500 MG tablet TAKE 1 TABLET DAILY WITH   BREAKFAST  Babs Lowry MD   cephALEXin (KEFLEX) 250 MG capsule TAKE 1 CAPSULE DAILY  Babs Lowry MD   metoprolol succinate (TOPROL XL) 50 MG extended release tablet TAKE 1 TABLET DAILY  Babs Lowry MD   spironolactone-hydroCHLOROthiazide (ALDACTAZIDE) 25-25 MG per tablet TAKE 1 TABLET DAILY  Babs Lowry MD   escitalopram (LEXAPRO) 20 MG tablet TAKE 1 TABLET DAILY  Babs Lowry MD   furosemide (LASIX) 40 MG tablet TAKE 1 TABLET DAILY  Babs Lowry MD   butalbital-acetaminophen-caffeine (FIORICET, ESGIC) -40 MG per tablet Take 1 tablet by mouth daily as needed for Headaches  Babs Lowry MD   losartan (COZAAR) 50 MG tablet Take 1 tablet by mouth daily  Johan Alexis MD   Multiple Vitamins-Minerals-FA (OCUVEL PO) Take 1 tablet by mouth daily  Historical Provider, MD   olopatadine (PATADAY) 0.2 % SOLN ophthalmic solution Apply 1 drop to eye daily Both eyes  Historical Provider, MD   niacin 500 MG extended release capsule Take 500 mg by mouth nightly  Historical Provider, MD   ammonium lactate (LAC-HYDRIN) 12 % lotion Apply topically daily. Patient taking differently: Apply topically as needed Apply topically daily. Babs Lowry MD   Blood Glucose Monitoring Suppl ([address]) W/DEVICE KIT Patient tests once daily. DX:E11.9  Babs Lowry MD   [address] Patient tests once daily.   DX:E11.9  Babs Lowry MD   glucose blood VI test strips (ONE TOUCH TEST STRIPS) strip Patient tests once daily. DX: E11.9  Karsten Glez MD       Past Medical History:   Diagnosis Date    [facility]     Atrial fibrillation ([facility]     CAD (coronary artery disease)     Cancer (HCC)     skin cancer    Diabetes mellitus (HCC)     GERD (gastroesophageal reflux disease)     Hemiplegia, unspecified, affecting nondominant side     History of blood transfusion     Hyperlipidemia     Hypertension     Other disorders of kidney and ureter in diseases classified elsewhere     Psychiatric problem     Unspecified cerebral artery occlusion with cerebral infarction 2012    Unspecified cerebral artery occlusion with cerebral infarction        Past Surgical History:   Procedure Laterality Date    CHOLECYSTECTOMY      CYSTOSCOPY  08/10/2017    DILATION AND CURETTAGE OF UTERUS  12/06/2016    HEMORRHOID SURGERY      KNEE ARTHROSCOPY      bilateral    SKIN BIOPSY         No family history on file. CareTeam (Including outside providers/suppliers regularly involved in providing care):   Patient Care Team:  Karsten Glez MD as PCP - General  Karsten Glez MD as PCP - [facility] Empaneled Provider    Wt Readings from Last 3 Encounters:   01/20/20 257 lb 3.2 oz (116.7 kg)   10/16/18 272 lb 0.8 oz (123.4 kg)   10/10/18 272 lb (123.4 kg)     Vitals:    05/04/21 1531   Temp: 97.5 °F (36.4 °C)     There is no height or weight on file to calculate BMI. Based upon direct observation of the patient, evaluation of cognition reveals global memory impairment noted. General appearance: elderly female alert, appears stated age and cooperative   Head: Normocephalic, without obvious abnormality, atraumatic   Eyes: conjunctivae/corneas clear. PERRL, EOM's intact. Neck: no adenopathy, no carotid bruit, no JVD, supple, symmetrical, trachea midline and thyroid not enlarged, symmetric, some cervical  Tenderness.   Lungs- clear dusty Heart: irregular rate and rhythm, S1, S2 normal, no murmur, click, rub or gallop   Abdomen: soft, non-tender; bowel sounds normal; no masses, no organomegaly   Extremities: extremities normal, atraumatic, dependant edema  Pulses: 2+ and symmetric   Edema - dependant edema , left great toe superficial wound from ingrowing toe nail noted   Neurologic: Grossly normal, baseline dementia  left sided cva with chronic weakness . Wheel chair bound         Echo 8/18  Left ventricular systolic function appears hyperdynamic with ejection   fraction estimated at 60-65 %   There is mild concentric left ventricular hypertrophy.   Grade III diastolic dysfunction with elevated filling pressure.   Biatrial enlargement.   The aortic valve is thickened/calcified with decreased leaflet mobility   consistent with aortic stenosis.   Mild aortic stenosis.   Mitral annular calcification is present.   Mild to moderate mitral regurgitation.   Moderate tricuspid regurgitation.   Systolic pulmonary artery pressure (SPAP) estimated at 40 mmHg (right atrial   pressure 3 mmHg), consistent with mild pulmonary hypertension      Assessment:          Diagnosis Orders   1. Medicare annual wellness visit, subsequent     2. Late onset Alzheimer's disease with behavioral disturbance ([facility]     3. Type 2 diabetes mellitus without complication, without long-term current use of insulin ([facility]     4. Coronary artery disease involving native coronary artery of native heart without angina pectoris     5. Paroxysmal atrial fibrillation (HCC)     6. Chronic diastolic CHF (congestive heart failure) ([facility]     7. Mild episode of recurrent major depressive disorder (HCC)             Plan:           Chronic diastolic CHF - on lasix 40 mg daily   . Aldactazide  continued  Remains on RA  Low salt diet  Monitor K levels with supplements    HTN - well controlled.  Continue aldactone , hctz losartan   Check K levels with being on aldactone and losartan    Afibb - rate controlled - on digoxin, b blocker. Now On eliquis 5 mg bid - off ASA   Check  digoxin levels         CVA -with left sided hemiparesis  thought to be embolic -from Afibb., Stable  No bleeding issues  Continue statins     Dm- 2 -well controlled. stable on metformin , last  A1c at 7. Home sugars stable       Urinary incontinence - on toviaz- can stop       Depression -on lexapron for depression and seroquel at night for agitation with dementia       Dementia - with behavioral disturbance  - on seroquel at night   - memory stable per son     Morbid obesity- - unable to exercise with cva, ext weakness wheel chair bound status  Has vision issues and mild dementia      Pt with cva and hemiplegia, morbid obesity, risk for falls  And aspiration    Had flu and pna vaccines  Had covid vaccine    Has living will    Family refusing home care.  provides most of the care  Need eye MD visit          Patient's complete Health Risk Assessment and screening values have been reviewed and are found in Flowsheets. The following problems were reviewed today and where indicated follow up appointments were made and/or referrals ordered. Positive Risk Factor Screenings with Interventions:     Fall Risk:  Timed Up and Go Test > 12 seconds? (Complete if either Fall Risk answers are Yes): (!) yes  2 or more falls in past year?: (!) yes  Fall with injury in past year?: no  Fall Risk Interventions:    · Patient declines any further evaluation/treatment for this issue        General Health and ACP:  General  In general, how would you say your health is?: Fair  In the past 7 days, have you experienced any of the following?  New or Increased Pain, New or Increased Fatigue, Loneliness, Social Isolation, Stress or Anger?: (!) Anger  Do you get the social and emotional support that you need?: Yes  Do you have a Living Will?: Yes  Advance Directives     Power of  Living Will ACP-Advance Directive ACP-Power of     Not on File Filed on 05/14/19 Filed Not on File      General Health Risk Interventions:  · Poor self-assessment of health status: referred for Vibha Almeida Habits/Nutrition:  Health Habits/Nutrition  Do you exercise for at least 20 minutes 2-3 times per week?: Yes  Have you lost any weight without trying in the past 3 months?: No  Do you eat only one meal per day?: No  Have you seen the dentist within the past year?: N/A - wear dentures     Health Habits/Nutrition Interventions:  · Dental exam overdue:  patient encouraged to make appointment with his/her dentist    Hearing/Vision:  No exam data present  Hearing/Vision  Do you or your family notice any trouble with your hearing that hasn't been managed with hearing aids?: No  Do you have difficulty driving, watching TV, or doing any of your daily activities because of your eyesight?: (!) Yes  Have you had an eye exam within the past year?: (!) No  Hearing/Vision Interventions:  · Vision concerns:  patient encouraged to make appointment with his/her eye specialist    Safety:  Safety  Do you have working smoke detectors?: Yes  Have all throw rugs been removed or fastened?: Yes  Do you have non-slip mats or surfaces in all bathtubs/showers?: (!) No  Do all of your stairways have a railing or banister?: Yes  Are your doorways, halls and stairs free of clutter?: Yes  Do you always fasten your seatbelt when you are in a car?: Yes  Safety Interventions:  · Referred for Home Health Services    ADL:  ADLs  In the past 7 days, did you need help from others to perform any of the following everyday activities? Eating, dressing, grooming, bathing, toileting, or walking/balance?: (!) Dressing, Grooming, Bathing, Toileting  In the past 7 days, did you need help from others to take care of any of the following?  Laundry, housekeeping, banking/finances, shopping, telephone use, food preparation, transportation, or taking medications?: [employer], Housekeeping, Banking/Finances, Shopping, Food Preparation, Transportation, Taking Medications  ADL Interventions:  · Referred for [address]    Personalized Preventive Plan   Current Health Maintenance Status  Immunization History   Administered Date(s) Administered    COVID-19, Rib[place] Peter, PF, 30mcg/0.3mL 03/05/2021, 03/26/2021    Influenza Virus Vaccine 10/16/2012, 09/17/2014, 09/22/2014, 09/03/2015, 10/14/2016, 10/01/2019    Influenza Whole 10/16/2012, 09/17/2014, 09/22/2014    Influenza, High Dose (Fluzone 65 yrs and older) 10/14/2016, 09/06/2018    Pneumococcal Conjugate 13-valent (Mngjlkt13) 12/02/2016    Pneumococcal Conjugate 7-valent (Prevnar7) 10/16/2012    Pneumococcal Polysaccharide (Cphuifces72) 10/03/2007, 09/12/2012, 04/01/2017    Tdap (Boostrix, Adacel) 10/10/2018    Tetanus 05/01/1991    Tetanus Toxoid, absorbed 05/01/1991        Health Maintenance   Topic Date Due    Shingles Vaccine (1 of 2) Never done   [employer] Visit (AWV)  Never done    Flu vaccine (Season Ended) 09/01/2021    Lipid screen  10/29/2021    Potassium monitoring  10/29/2021    Creatinine monitoring  10/29/2021    DTaP/Tdap/Td vaccine (2 - Td) 10/10/2028    Pneumococcal 65+ years Vaccine  Completed    COVID-19 Vaccine  Completed    Hepatitis A vaccine  Aged Out    Hib vaccine  Aged Out    Meningococcal (ACWY) vaccine  Aged Out     Recommendations for "[employer] Due: see orders and patient instructions/AVS.  . Recommended screening schedule for the next 5-10 years is provided to the patient in written form: see Patient Instructions/AVS.    There are no diagnoses linked to this encounter.

## 2021-05-05 LAB
A/G RATIO: 1.7 (ref 1.1–2.2)
ALBUMIN SERPL-MCNC: 4 G/DL (ref 3.4–5)
ALP BLD-CCNC: 111 U/L (ref 40–129)
ALT SERPL-CCNC: 31 U/L (ref 10–40)
ANION GAP SERPL CALCULATED.3IONS-SCNC: 16 MMOL/L (ref 3–16)
AST SERPL-CCNC: 41 U/L (ref 15–37)
BASOPHILS ABSOLUTE: 0.1 K/UL (ref 0–0.2)
BASOPHILS RELATIVE PERCENT: 0.8 %
BILIRUB SERPL-MCNC: 0.4 MG/DL (ref 0–1)
BUN BLDV-MCNC: 22 MG/DL (ref 7–20)
CALCIUM SERPL-MCNC: 8.6 MG/DL (ref 8.3–10.6)
CHLORIDE BLD-SCNC: 81 MMOL/L (ref 99–110)
CO2: 26 MMOL/L (ref 21–32)
CREAT SERPL-MCNC: 1 MG/DL (ref 0.6–1.2)
EOSINOPHILS ABSOLUTE: 0.6 K/UL (ref 0–0.6)
EOSINOPHILS RELATIVE PERCENT: 9.3 %
ESTIMATED AVERAGE GLUCOSE: 165.7 MG/DL
GFR AFRICAN AMERICAN: >60
GFR NON-AFRICAN AMERICAN: 52
GLOBULIN: 2.3 G/DL
GLUCOSE BLD-MCNC: 201 MG/DL (ref 70–99)
HBA1C MFR BLD: 7.4 %
HCT VFR BLD CALC: 37 % (ref 36–48)
HEMOGLOBIN: 13 G/DL (ref 12–16)
LYMPHOCYTES ABSOLUTE: 2.3 K/UL (ref 1–5.1)
LYMPHOCYTES RELATIVE PERCENT: 33.6 %
MCH RBC QN AUTO: 31.6 PG (ref 26–34)
MCHC RBC AUTO-ENTMCNC: 35.2 G/DL (ref 31–36)
MCV RBC AUTO: 89.7 FL (ref 80–100)
MONOCYTES ABSOLUTE: 0.6 K/UL (ref 0–1.3)
MONOCYTES RELATIVE PERCENT: 9.2 %
NEUTROPHILS ABSOLUTE: 3.2 K/UL (ref 1.7–7.7)
NEUTROPHILS RELATIVE PERCENT: 47.1 %
PDW BLD-RTO: 12.8 % (ref 12.4–15.4)
PLATELET # BLD: 250 K/UL (ref 135–450)
PMV BLD AUTO: 7.6 FL (ref 5–10.5)
POTASSIUM SERPL-SCNC: 4.6 MMOL/L (ref 3.5–5.1)
RBC # BLD: 4.13 M/UL (ref 4–5.2)
SODIUM BLD-SCNC: 123 MMOL/L (ref 136–145)
TOTAL PROTEIN: 6.3 G/DL (ref 6.4–8.2)
TSH REFLEX: 2.76 UIU/ML (ref 0.27–4.2)
URIC ACID, SERUM: 11.8 MG/DL (ref 2.6–6)
WBC # BLD: 6.8 K/UL (ref 4–11)

## 2021-05-10 ENCOUNTER — TELEPHONE (OUTPATIENT)
Dept: INTERNAL MEDICINE CLINIC | Age: 86
End: 2021-05-10

## 2021-05-10 DIAGNOSIS — E87.1 HYPONATREMIA: Primary | ICD-10-CM

## 2021-06-03 PROBLEM — Z00.00 MEDICARE ANNUAL WELLNESS VISIT, SUBSEQUENT: Status: RESOLVED | Noted: 2021-05-04 | Resolved: 2021-06-03

## 2021-06-17 RX ORDER — ESCITALOPRAM OXALATE 20 MG/1
TABLET ORAL
Qty: 90 TABLET | Refills: 0 | Status: SHIPPED | OUTPATIENT
Start: 2021-06-17 | End: 2021-09-30

## 2021-06-17 RX ORDER — FUROSEMIDE 40 MG/1
TABLET ORAL
Qty: 90 TABLET | Refills: 0 | Status: ON HOLD | OUTPATIENT
Start: 2021-06-17 | End: 2021-08-17 | Stop reason: HOSPADM

## 2021-06-17 RX ORDER — METOPROLOL SUCCINATE 50 MG/1
TABLET, EXTENDED RELEASE ORAL
Qty: 90 TABLET | Refills: 0 | Status: ON HOLD | OUTPATIENT
Start: 2021-06-17 | End: 2021-08-17 | Stop reason: HOSPADM

## 2021-06-17 RX ORDER — SPIRONOLACTONE AND HYDROCHLOROTHIAZIDE 25; 25 MG/1; MG/1
TABLET ORAL
Qty: 90 TABLET | Refills: 0 | Status: SHIPPED | OUTPATIENT
Start: 2021-06-17 | End: 2021-09-30

## 2021-07-06 DIAGNOSIS — G30.1 LATE ONSET ALZHEIMER'S DISEASE WITH BEHAVIORAL DISTURBANCE (HCC): ICD-10-CM

## 2021-07-06 DIAGNOSIS — F02.818 LATE ONSET ALZHEIMER'S DISEASE WITH BEHAVIORAL DISTURBANCE (HCC): ICD-10-CM

## 2021-07-06 RX ORDER — TOLTERODINE 2 MG/1
CAPSULE, EXTENDED RELEASE ORAL
Qty: 90 CAPSULE | Refills: 0 | Status: ON HOLD | OUTPATIENT
Start: 2021-07-06 | End: 2021-08-17 | Stop reason: SDUPTHER

## 2021-07-06 RX ORDER — POTASSIUM CHLORIDE 20 MEQ/1
TABLET, EXTENDED RELEASE ORAL
Qty: 90 TABLET | Refills: 0 | Status: ON HOLD | OUTPATIENT
Start: 2021-07-06 | End: 2021-08-17 | Stop reason: HOSPADM

## 2021-07-06 RX ORDER — QUETIAPINE FUMARATE 50 MG/1
TABLET, FILM COATED ORAL
Qty: 180 TABLET | Refills: 0 | Status: SHIPPED | OUTPATIENT
Start: 2021-07-06 | End: 2021-09-16

## 2021-07-16 RX ORDER — CEPHALEXIN 250 MG/1
CAPSULE ORAL
Qty: 90 CAPSULE | Refills: 0 | Status: ON HOLD | OUTPATIENT
Start: 2021-07-16 | End: 2021-08-17 | Stop reason: HOSPADM

## 2021-08-14 ENCOUNTER — APPOINTMENT (OUTPATIENT)
Dept: CT IMAGING | Age: 86
DRG: 689 | End: 2021-08-14
Payer: MEDICARE

## 2021-08-14 ENCOUNTER — HOSPITAL ENCOUNTER (INPATIENT)
Age: 86
LOS: 2 days | Discharge: HOME HEALTH | DRG: 689 | End: 2021-08-17
Attending: EMERGENCY MEDICINE | Admitting: INTERNAL MEDICINE
Payer: MEDICARE

## 2021-08-14 ENCOUNTER — APPOINTMENT (OUTPATIENT)
Dept: GENERAL RADIOLOGY | Age: 86
DRG: 689 | End: 2021-08-14
Payer: MEDICARE

## 2021-08-14 DIAGNOSIS — N39.0 URINARY TRACT INFECTION WITHOUT HEMATURIA, SITE UNSPECIFIED: ICD-10-CM

## 2021-08-14 DIAGNOSIS — R00.1 BRADYCARDIA: ICD-10-CM

## 2021-08-14 DIAGNOSIS — E86.0 DEHYDRATION: ICD-10-CM

## 2021-08-14 DIAGNOSIS — G93.41 METABOLIC ENCEPHALOPATHY: Primary | ICD-10-CM

## 2021-08-14 DIAGNOSIS — N17.9 AKI (ACUTE KIDNEY INJURY) (HCC): ICD-10-CM

## 2021-08-14 LAB
A/G RATIO: 1.1 (ref 1.1–2.2)
ALBUMIN SERPL-MCNC: 3.8 G/DL (ref 3.4–5)
ALP BLD-CCNC: 71 U/L (ref 40–129)
ALT SERPL-CCNC: 16 U/L (ref 10–40)
ANION GAP SERPL CALCULATED.3IONS-SCNC: 14 MMOL/L (ref 3–16)
AST SERPL-CCNC: 26 U/L (ref 15–37)
BACTERIA: ABNORMAL /HPF
BASOPHILS ABSOLUTE: 0.1 K/UL (ref 0–0.2)
BASOPHILS RELATIVE PERCENT: 1.3 %
BILIRUB SERPL-MCNC: 0.7 MG/DL (ref 0–1)
BILIRUBIN URINE: NEGATIVE
BLOOD, URINE: ABNORMAL
BUN BLDV-MCNC: 36 MG/DL (ref 7–20)
CALCIUM SERPL-MCNC: 9.7 MG/DL (ref 8.3–10.6)
CHLORIDE BLD-SCNC: 105 MMOL/L (ref 99–110)
CLARITY: ABNORMAL
CO2: 18 MMOL/L (ref 21–32)
COLOR: YELLOW
CREAT SERPL-MCNC: 1.4 MG/DL (ref 0.6–1.2)
EOSINOPHILS ABSOLUTE: 0.5 K/UL (ref 0–0.6)
EOSINOPHILS RELATIVE PERCENT: 8.4 %
EPITHELIAL CELLS, UA: ABNORMAL /HPF (ref 0–5)
GFR AFRICAN AMERICAN: 43
GFR NON-AFRICAN AMERICAN: 36
GLOBULIN: 3.5 G/DL
GLUCOSE BLD-MCNC: 129 MG/DL (ref 70–99)
GLUCOSE URINE: NEGATIVE MG/DL
HCT VFR BLD CALC: 42.4 % (ref 36–48)
HEMOGLOBIN: 14.1 G/DL (ref 12–16)
KETONES, URINE: NEGATIVE MG/DL
LACTIC ACID: 1.4 MMOL/L (ref 0.4–2)
LEUKOCYTE ESTERASE, URINE: ABNORMAL
LYMPHOCYTES ABSOLUTE: 2.6 K/UL (ref 1–5.1)
LYMPHOCYTES RELATIVE PERCENT: 44.1 %
MCH RBC QN AUTO: 31.4 PG (ref 26–34)
MCHC RBC AUTO-ENTMCNC: 33.1 G/DL (ref 31–36)
MCV RBC AUTO: 94.7 FL (ref 80–100)
MICROSCOPIC EXAMINATION: YES
MONOCYTES ABSOLUTE: 0.6 K/UL (ref 0–1.3)
MONOCYTES RELATIVE PERCENT: 9.5 %
NEUTROPHILS ABSOLUTE: 2.2 K/UL (ref 1.7–7.7)
NEUTROPHILS RELATIVE PERCENT: 36.7 %
NITRITE, URINE: NEGATIVE
PDW BLD-RTO: 13.6 % (ref 12.4–15.4)
PH UA: 5.5 (ref 5–8)
PLATELET # BLD: 195 K/UL (ref 135–450)
PMV BLD AUTO: 7.9 FL (ref 5–10.5)
POTASSIUM REFLEX MAGNESIUM: 4.9 MMOL/L (ref 3.5–5.1)
PRO-BNP: 1006 PG/ML (ref 0–449)
PROTEIN UA: NEGATIVE MG/DL
RBC # BLD: 4.48 M/UL (ref 4–5.2)
RBC UA: ABNORMAL /HPF (ref 0–4)
SODIUM BLD-SCNC: 137 MMOL/L (ref 136–145)
SPECIFIC GRAVITY UA: 1.01 (ref 1–1.03)
TOTAL PROTEIN: 7.3 G/DL (ref 6.4–8.2)
TROPONIN: 0.02 NG/ML
TROPONIN: 0.02 NG/ML
URINE REFLEX TO CULTURE: YES
URINE TYPE: ABNORMAL
UROBILINOGEN, URINE: 0.2 E.U./DL
WBC # BLD: 5.9 K/UL (ref 4–11)
WBC UA: ABNORMAL /HPF (ref 0–5)

## 2021-08-14 PROCEDURE — 80053 COMPREHEN METABOLIC PANEL: CPT

## 2021-08-14 PROCEDURE — 84443 ASSAY THYROID STIM HORMONE: CPT

## 2021-08-14 PROCEDURE — 83605 ASSAY OF LACTIC ACID: CPT

## 2021-08-14 PROCEDURE — 84484 ASSAY OF TROPONIN QUANT: CPT

## 2021-08-14 PROCEDURE — 70450 CT HEAD/BRAIN W/O DYE: CPT

## 2021-08-14 PROCEDURE — 83880 ASSAY OF NATRIURETIC PEPTIDE: CPT

## 2021-08-14 PROCEDURE — 93005 ELECTROCARDIOGRAM TRACING: CPT | Performed by: EMERGENCY MEDICINE

## 2021-08-14 PROCEDURE — 99285 EMERGENCY DEPT VISIT HI MDM: CPT

## 2021-08-14 PROCEDURE — 87086 URINE CULTURE/COLONY COUNT: CPT

## 2021-08-14 PROCEDURE — 85025 COMPLETE CBC W/AUTO DIFF WBC: CPT

## 2021-08-14 PROCEDURE — 80162 ASSAY OF DIGOXIN TOTAL: CPT

## 2021-08-14 PROCEDURE — 2580000003 HC RX 258: Performed by: EMERGENCY MEDICINE

## 2021-08-14 PROCEDURE — 71045 X-RAY EXAM CHEST 1 VIEW: CPT

## 2021-08-14 PROCEDURE — 81003 URINALYSIS AUTO W/O SCOPE: CPT

## 2021-08-14 PROCEDURE — 96361 HYDRATE IV INFUSION ADD-ON: CPT

## 2021-08-14 RX ORDER — 0.9 % SODIUM CHLORIDE 0.9 %
1000 INTRAVENOUS SOLUTION INTRAVENOUS ONCE
Status: COMPLETED | OUTPATIENT
Start: 2021-08-14 | End: 2021-08-14

## 2021-08-14 RX ADMIN — SODIUM CHLORIDE 1000 ML: 9 INJECTION, SOLUTION INTRAVENOUS at 21:20

## 2021-08-14 NOTE — ED NOTES
Bed: 10  Expected date:   Expected time:   Means of arrival:   Comments:  Lacho Headings  08/14/21 7828

## 2021-08-15 PROBLEM — N39.0 UTI (URINARY TRACT INFECTION): Status: ACTIVE | Noted: 2021-08-15

## 2021-08-15 LAB
ANION GAP SERPL CALCULATED.3IONS-SCNC: 13 MMOL/L (ref 3–16)
BASOPHILS ABSOLUTE: 0.1 K/UL (ref 0–0.2)
BASOPHILS RELATIVE PERCENT: 1 %
BUN BLDV-MCNC: 33 MG/DL (ref 7–20)
CALCIUM SERPL-MCNC: 9 MG/DL (ref 8.3–10.6)
CHLORIDE BLD-SCNC: 108 MMOL/L (ref 99–110)
CO2: 18 MMOL/L (ref 21–32)
CREAT SERPL-MCNC: 1.2 MG/DL (ref 0.6–1.2)
DIGOXIN LEVEL: 1.9 NG/ML (ref 0.8–2)
EKG ATRIAL RATE: 416 BPM
EKG DIAGNOSIS: NORMAL
EKG Q-T INTERVAL: 482 MS
EKG QRS DURATION: 122 MS
EKG QTC CALCULATION (BAZETT): 501 MS
EKG R AXIS: -65 DEGREES
EKG T AXIS: 106 DEGREES
EKG VENTRICULAR RATE: 65 BPM
EOSINOPHILS ABSOLUTE: 0.5 K/UL (ref 0–0.6)
EOSINOPHILS RELATIVE PERCENT: 7.1 %
GFR AFRICAN AMERICAN: 51
GFR NON-AFRICAN AMERICAN: 42
GLUCOSE BLD-MCNC: 147 MG/DL (ref 70–99)
HCT VFR BLD CALC: 40.7 % (ref 36–48)
HEMOGLOBIN: 13.6 G/DL (ref 12–16)
LYMPHOCYTES ABSOLUTE: 2.2 K/UL (ref 1–5.1)
LYMPHOCYTES RELATIVE PERCENT: 34 %
MCH RBC QN AUTO: 31.3 PG (ref 26–34)
MCHC RBC AUTO-ENTMCNC: 33.3 G/DL (ref 31–36)
MCV RBC AUTO: 94.1 FL (ref 80–100)
MONOCYTES ABSOLUTE: 0.7 K/UL (ref 0–1.3)
MONOCYTES RELATIVE PERCENT: 10.6 %
NEUTROPHILS ABSOLUTE: 3 K/UL (ref 1.7–7.7)
NEUTROPHILS RELATIVE PERCENT: 47.3 %
PDW BLD-RTO: 13.7 % (ref 12.4–15.4)
PLATELET # BLD: 175 K/UL (ref 135–450)
PMV BLD AUTO: 8.2 FL (ref 5–10.5)
POTASSIUM REFLEX MAGNESIUM: 4.5 MMOL/L (ref 3.5–5.1)
RBC # BLD: 4.33 M/UL (ref 4–5.2)
SODIUM BLD-SCNC: 139 MMOL/L (ref 136–145)
TROPONIN: 0.02 NG/ML
TSH REFLEX: 2.15 UIU/ML (ref 0.27–4.2)
WBC # BLD: 6.4 K/UL (ref 4–11)

## 2021-08-15 PROCEDURE — 1200000000 HC SEMI PRIVATE

## 2021-08-15 PROCEDURE — 2580000003 HC RX 258: Performed by: INTERNAL MEDICINE

## 2021-08-15 PROCEDURE — 2700000000 HC OXYGEN THERAPY PER DAY

## 2021-08-15 PROCEDURE — 85025 COMPLETE CBC W/AUTO DIFF WBC: CPT

## 2021-08-15 PROCEDURE — 6370000000 HC RX 637 (ALT 250 FOR IP): Performed by: EMERGENCY MEDICINE

## 2021-08-15 PROCEDURE — 96365 THER/PROPH/DIAG IV INF INIT: CPT

## 2021-08-15 PROCEDURE — 6370000000 HC RX 637 (ALT 250 FOR IP): Performed by: INTERNAL MEDICINE

## 2021-08-15 PROCEDURE — 93010 ELECTROCARDIOGRAM REPORT: CPT | Performed by: INTERNAL MEDICINE

## 2021-08-15 PROCEDURE — 80048 BASIC METABOLIC PNL TOTAL CA: CPT

## 2021-08-15 PROCEDURE — 99222 1ST HOSP IP/OBS MODERATE 55: CPT | Performed by: INTERNAL MEDICINE

## 2021-08-15 PROCEDURE — 84484 ASSAY OF TROPONIN QUANT: CPT

## 2021-08-15 PROCEDURE — 94761 N-INVAS EAR/PLS OXIMETRY MLT: CPT

## 2021-08-15 PROCEDURE — 96361 HYDRATE IV INFUSION ADD-ON: CPT

## 2021-08-15 PROCEDURE — 2580000003 HC RX 258: Performed by: EMERGENCY MEDICINE

## 2021-08-15 PROCEDURE — 6360000002 HC RX W HCPCS: Performed by: EMERGENCY MEDICINE

## 2021-08-15 RX ORDER — ESCITALOPRAM OXALATE 10 MG/1
20 TABLET ORAL DAILY
Status: DISCONTINUED | OUTPATIENT
Start: 2021-08-15 | End: 2021-08-17 | Stop reason: HOSPADM

## 2021-08-15 RX ORDER — PANTOPRAZOLE SODIUM 40 MG/1
40 TABLET, DELAYED RELEASE ORAL
Status: DISCONTINUED | OUTPATIENT
Start: 2021-08-15 | End: 2021-08-17 | Stop reason: HOSPADM

## 2021-08-15 RX ORDER — ACETAMINOPHEN 650 MG/1
650 SUPPOSITORY RECTAL EVERY 6 HOURS PRN
Status: DISCONTINUED | OUTPATIENT
Start: 2021-08-15 | End: 2021-08-17 | Stop reason: HOSPADM

## 2021-08-15 RX ORDER — 0.9 % SODIUM CHLORIDE 0.9 %
1000 INTRAVENOUS SOLUTION INTRAVENOUS ONCE
Status: COMPLETED | OUTPATIENT
Start: 2021-08-15 | End: 2021-08-15

## 2021-08-15 RX ORDER — FUROSEMIDE 40 MG/1
40 TABLET ORAL DAILY
Status: DISCONTINUED | OUTPATIENT
Start: 2021-08-15 | End: 2021-08-16

## 2021-08-15 RX ORDER — ONDANSETRON 4 MG/1
4 TABLET, ORALLY DISINTEGRATING ORAL EVERY 8 HOURS PRN
Status: DISCONTINUED | OUTPATIENT
Start: 2021-08-15 | End: 2021-08-17 | Stop reason: HOSPADM

## 2021-08-15 RX ORDER — ONDANSETRON 2 MG/ML
4 INJECTION INTRAMUSCULAR; INTRAVENOUS EVERY 6 HOURS PRN
Status: DISCONTINUED | OUTPATIENT
Start: 2021-08-15 | End: 2021-08-17 | Stop reason: HOSPADM

## 2021-08-15 RX ORDER — SPIRONOLACTONE AND HYDROCHLOROTHIAZIDE 25; 25 MG/1; MG/1
1 TABLET ORAL DAILY
Status: DISCONTINUED | OUTPATIENT
Start: 2021-08-15 | End: 2021-08-15 | Stop reason: CLARIF

## 2021-08-15 RX ORDER — ATORVASTATIN CALCIUM 10 MG/1
10 TABLET, FILM COATED ORAL DAILY
Status: DISCONTINUED | OUTPATIENT
Start: 2021-08-15 | End: 2021-08-17 | Stop reason: HOSPADM

## 2021-08-15 RX ORDER — HYDROCHLOROTHIAZIDE 25 MG/1
25 TABLET ORAL DAILY
Status: DISCONTINUED | OUTPATIENT
Start: 2021-08-15 | End: 2021-08-16

## 2021-08-15 RX ORDER — SODIUM CHLORIDE 0.9 % (FLUSH) 0.9 %
5-40 SYRINGE (ML) INJECTION EVERY 12 HOURS SCHEDULED
Status: DISCONTINUED | OUTPATIENT
Start: 2021-08-15 | End: 2021-08-17 | Stop reason: HOSPADM

## 2021-08-15 RX ORDER — QUETIAPINE FUMARATE 25 MG/1
50 TABLET, FILM COATED ORAL ONCE
Status: COMPLETED | OUTPATIENT
Start: 2021-08-15 | End: 2021-08-15

## 2021-08-15 RX ORDER — SODIUM CHLORIDE 9 MG/ML
INJECTION, SOLUTION INTRAVENOUS CONTINUOUS
Status: DISCONTINUED | OUTPATIENT
Start: 2021-08-15 | End: 2021-08-16

## 2021-08-15 RX ORDER — TROSPIUM CHLORIDE 20 MG/1
20 TABLET, FILM COATED ORAL NIGHTLY
Status: DISCONTINUED | OUTPATIENT
Start: 2021-08-15 | End: 2021-08-17 | Stop reason: HOSPADM

## 2021-08-15 RX ORDER — ACETAMINOPHEN 325 MG/1
650 TABLET ORAL EVERY 6 HOURS PRN
Status: DISCONTINUED | OUTPATIENT
Start: 2021-08-15 | End: 2021-08-17 | Stop reason: HOSPADM

## 2021-08-15 RX ORDER — SODIUM CHLORIDE 0.9 % (FLUSH) 0.9 %
5-40 SYRINGE (ML) INJECTION PRN
Status: DISCONTINUED | OUTPATIENT
Start: 2021-08-15 | End: 2021-08-17 | Stop reason: HOSPADM

## 2021-08-15 RX ORDER — TOLTERODINE 2 MG/1
1 CAPSULE, EXTENDED RELEASE ORAL DAILY
Status: DISCONTINUED | OUTPATIENT
Start: 2021-08-15 | End: 2021-08-15 | Stop reason: CLARIF

## 2021-08-15 RX ORDER — SPIRONOLACTONE 25 MG/1
25 TABLET ORAL DAILY
Status: DISCONTINUED | OUTPATIENT
Start: 2021-08-15 | End: 2021-08-17 | Stop reason: HOSPADM

## 2021-08-15 RX ORDER — LOSARTAN POTASSIUM 25 MG/1
50 TABLET ORAL DAILY
Status: DISCONTINUED | OUTPATIENT
Start: 2021-08-15 | End: 2021-08-16

## 2021-08-15 RX ORDER — POLYETHYLENE GLYCOL 3350 17 G/17G
17 POWDER, FOR SOLUTION ORAL DAILY PRN
Status: DISCONTINUED | OUTPATIENT
Start: 2021-08-15 | End: 2021-08-17 | Stop reason: HOSPADM

## 2021-08-15 RX ORDER — QUETIAPINE FUMARATE 25 MG/1
50 TABLET, FILM COATED ORAL 2 TIMES DAILY
Status: DISCONTINUED | OUTPATIENT
Start: 2021-08-15 | End: 2021-08-17 | Stop reason: HOSPADM

## 2021-08-15 RX ORDER — SODIUM CHLORIDE 9 MG/ML
25 INJECTION, SOLUTION INTRAVENOUS PRN
Status: DISCONTINUED | OUTPATIENT
Start: 2021-08-15 | End: 2021-08-17 | Stop reason: HOSPADM

## 2021-08-15 RX ADMIN — Medication 10 ML: at 10:18

## 2021-08-15 RX ADMIN — QUETIAPINE FUMARATE 50 MG: 25 TABLET ORAL at 09:04

## 2021-08-15 RX ADMIN — APIXABAN 2.5 MG: 5 TABLET, FILM COATED ORAL at 09:03

## 2021-08-15 RX ADMIN — Medication 10 ML: at 20:36

## 2021-08-15 RX ADMIN — ESCITALOPRAM OXALATE 20 MG: 10 TABLET ORAL at 09:03

## 2021-08-15 RX ADMIN — QUETIAPINE FUMARATE 50 MG: 25 TABLET ORAL at 02:14

## 2021-08-15 RX ADMIN — SODIUM CHLORIDE 1000 ML: 9 INJECTION, SOLUTION INTRAVENOUS at 02:10

## 2021-08-15 RX ADMIN — PANTOPRAZOLE SODIUM 40 MG: 40 TABLET, DELAYED RELEASE ORAL at 10:19

## 2021-08-15 RX ADMIN — SODIUM CHLORIDE: 9 INJECTION, SOLUTION INTRAVENOUS at 04:14

## 2021-08-15 RX ADMIN — ATORVASTATIN CALCIUM 10 MG: 10 TABLET, FILM COATED ORAL at 09:05

## 2021-08-15 RX ADMIN — APIXABAN 2.5 MG: 5 TABLET, FILM COATED ORAL at 20:36

## 2021-08-15 RX ADMIN — TROSPIUM CHLORIDE 20 MG: 20 TABLET, FILM COATED ORAL at 20:36

## 2021-08-15 RX ADMIN — QUETIAPINE FUMARATE 50 MG: 25 TABLET ORAL at 20:36

## 2021-08-15 RX ADMIN — CEFTRIAXONE SODIUM 1000 MG: 1 INJECTION, POWDER, FOR SOLUTION INTRAMUSCULAR; INTRAVENOUS at 02:13

## 2021-08-15 ASSESSMENT — PAIN SCALES - GENERAL: PAINLEVEL_OUTOF10: 0

## 2021-08-15 NOTE — ED NOTES
Narciso Aguilar [phone] Medical POA or Rafael Coley [phone] with any updates.      Digna Don RN  08/15/21 8722

## 2021-08-15 NOTE — PROGRESS NOTES
Bedside Mobility Assessment Tool (BMAT):     Assessment Level 1- Sit and Shake    1. From a semi-reclined position, ask patient to sit up and rotate to a seated position at the side of the bed. Can use the bedrail. 2. Ask patient to reach out and grab your hand and shake making sure patient reaches across his/her midline. Pass- Patient is able to come to a seated position, maintain core strength. Maintains seated balance while reaching across midline. Move on to Assessment Level 2. Assessment Level 2- Stretch and Point   1. With patient in seated position at the side of the bed, have patient place both feet on the floor (or stool) with knees no higher than hips. 2. Ask patient to stretch one leg and straighten the knee, then bend the ankle/flex and point the toes. If appropriate, repeat with the other leg. Fail- Patient is unable to complete task. Patient is MOBILITY LEVEL 2. Assessment Level 3- Stand   1. Ask patient to elevate off the bed or chair (seated to standing) using an assistive device (cane, bedrail). 2. Patient should be able to raise buttocks off be and hold for a count of five. May repeat once. Fail- Patient unable to demonstrate standing stability. Patient is MOBILITY LEVEL 3. Assessment Level 4- Walk   1. Ask patient to march in place at bedside. 2. Then ask patient to advance step and return each foot. Some medical conditions may render a patient from stepping backwards, use your best clinical judgement. Fail- Patient not able to complete tasks OR requires use of assistive device. Patient is MOBILITY LEVEL 3.        Mobility Level- 2

## 2021-08-15 NOTE — ED NOTES
[phone].  [facility] ([facility]) [address] [place] [address]  57/38/10 9398

## 2021-08-15 NOTE — H&P
Hospital Medicine History & Physical      PCP: Moy Reeves MD    Date of Admission: 8/14/2021    Date of Service: Pt seen/examined on 8/15/2021  and Admitted to Inpatient with expected LOS greater than two midnights due to medical therapy. Chief Complaint:    Chief Complaint   Patient presents with    Altered Mental Status     pt arrived to ED via ems from home for ams, per Osmin Causey her son Blaine Alarcon was a couple days ago, he noticed it was hard for her to stay focused and was delayed and symptoms progressed majorly this evening , pt has been able to answer all my questions upon arrival with a little bit of a delay, she is A&O x 4 at this time        History Of Present Illness: The patient is a 80 y.o. female with history of atrial fibrillation, hypertension, CAD, type 2 diabetes, chronic diastolic heart failure, dementia who lives with her  and son at home and presented with altered mental status than baseline worsening in the evening. She denies any fevers or chills. No dysuria or hematuria. No cough or production. In the ER, urinalysis was positive for UTI  She was noted to be bradycardic into the 40s in the setting of beta-blocker use and digoxin. ER discussed with cardiology who recommended admission to [facility] and holding insulting medications.   CT head did not reveal any acute pathology  Chest x-ray with no airspace disease    Past Medical History:        Diagnosis Date    Acute congestive heart failure (HCC)     Atrial fibrillation (HCC)     CAD (coronary artery disease)     Cancer (HCC)     skin cancer    Diabetes mellitus (HCC)     GERD (gastroesophageal reflux disease)     Hemiplegia, unspecified, affecting nondominant side     History of blood transfusion     Hyperlipidemia     Hypertension     Other disorders of kidney and ureter in diseases classified elsewhere     Psychiatric problem     Unspecified cerebral artery occlusion with cerebral infarction 2012  Unspecified cerebral artery occlusion with cerebral infarction        Past Surgical History:        Procedure Laterality Date    CHOLECYSTECTOMY      CYSTOSCOPY  08/10/2017    DILATION AND CURETTAGE OF UTERUS  12/06/2016    HEMORRHOID SURGERY      KNEE ARTHROSCOPY      bilateral    SKIN BIOPSY         Medications Prior to Admission:    Prior to Admission medications    Medication Sig Start Date End Date Taking?  Authorizing Provider   cephALEXin (KEFLEX) 250 MG capsule TAKE 1 CAPSULE DAILY 7/16/21   Jarod Borrero MD   metFORMIN (GLUCOPHAGE) 500 MG tablet TAKE 1 TABLET DAILY WITH   BREAKFAST 7/16/21   Jarod Borrero MD   tolterodine (DETROL LA) 2 MG extended release capsule TAKE 1 CAPSULE DAILY 7/6/21   Jarod Borrero MD   apixaban (ELIQUIS) 5 MG TABS tablet TAKE 1 TABLET TWICE A DAY 7/6/21   Jarod Borrero MD   QUEtiapine (SEROQUEL) 50 MG tablet TAKE 1 TABLET TWICE A DAY 7/6/21   Jarod Borrero MD   potassium chloride (KLOR-CON M20) 20 MEQ extended release tablet TAKE 1 TABLET DAILY 7/6/21   Jarod Borrero MD   escitalopram (LEXAPRO) 20 MG tablet TAKE 1 TABLET DAILY 6/17/21   Jarod Borrero MD   spironolactone-hydroCHLOROthiazide (ALDACTAZIDE) 25-25 MG per tablet TAKE 1 TABLET DAILY 6/17/21   Jarod Borrero MD   furosemide (LASIX) 40 MG tablet TAKE 1 TABLET DAILY 6/17/21   Jarod Borrero MD   metoprolol succinate (TOPROL XL) 50 MG extended release tablet TAKE 1 TABLET DAILY 6/17/21   Jarod Borrero MD   omeprazole (PRILOSEC) 40 MG delayed release capsule TAKE 1 CAPSULE DAILY 4/30/21   Jarod Borrero MD   digoxin (LANOXIN) 125 MCG tablet TAKE 1 TABLET DAILY 4/30/21   Jarod Borrero MD   simvastatin (ZOCOR) 20 MG tablet TAKE 1 TABLET NIGHTLY 4/30/21   Jarod Borrero MD   butalbital-acetaminophen-caffeine (FIORICET, ESGIC) -59 MG per tablet Take 1 tablet by mouth daily as needed for Headaches 12/7/20   Jarod Borrero MD losartan (COZAAR) 50 MG tablet Take 1 tablet by mouth daily 1/21/20   Nnamdi Hawthorne MD   Multiple Vitamins-Minerals-FA (OCUVEL PO) Take 1 tablet by mouth daily    Historical Provider, MD   olopatadine (PATADAY) 0.2 % SOLN ophthalmic solution Apply 1 drop to eye daily Both eyes    Historical Provider, MD   niacin 500 MG extended release capsule Take 500 mg by mouth nightly    Historical Provider, MD   ammonium lactate (LAC-HYDRIN) 12 % lotion Apply topically daily. Patient taking differently: Apply topically as needed Apply topically daily. 5/5/16   Cordell Blanchard MD   Blood Glucose Monitoring Suppl ([address]) W/DEVICE KIT Patient tests once daily. DX:E11.9 11/4/15   Cordell Blanchard MD   ONE TOUCH LANCETS MISC Patient tests once daily. DX:E11.9 11/4/15   Cordell Blanchard MD   glucose blood VI test strips (ONE TOUCH TEST STRIPS) strip Patient tests once daily. DX: E11.9 11/4/15   Cordell Blanchard MD       Allergies:  Tape [employer] tape] and Erythromycin    Social History:  The patient currently lives with family    TOBACCO:   reports that she has never smoked. She has never used smokeless tobacco.  ETOH:   reports no history of alcohol use. Family History:  Reviewed in detail and negative for DM, Early CAD, Cancer, CVA. Positive as follows:    History reviewed. No pertinent family history. REVIEW OF SYSTEMS:   Positive for altered mental status and as noted in the HPI. All other systems reviewed and negative. PHYSICAL EXAM:    BP (!) 124/58   Pulse 50   Temp 97.8 °F (36.6 °C) (Oral)   Resp 16   Ht 5' 2\" (1.575 m)   Wt 250 lb (113.4 kg)   SpO2 95%   BMI 45.73 kg/m²     General appearance: No apparent distress appears stated age and cooperative. HEENT Normal cephalic, atraumatic without obvious deformity. Pupils equal, round, and reactive to light. Extra ocular muscles intact. Conjunctivae/corneas clear.   Neck: Supple, No jugular venous distention/bruits. Lungs: Clear to auscultation, bilaterally without Rales/Wheezes/Rhonchi with good respiratory effort. Heart: Regular rate and rhythm with Normal S1/S2 without murmurs, rubs or gallops, point of maximum impulse non-displaced  Abdomen: Soft, non-tender or non-distended without rigidity or guarding and positive bowel sounds  Extremities: No clubbing, cyanosis, or edema bilaterally. Full range of motion without deformity and normal gait intact. Skin: Skin color, texture, turgor normal.  No rashes or lesions. Neurologic: Alert and oriented X 3, neurovascularly intact with sensory/motor intact upper extremities/lower extremities, bilaterally.   Cranial nerves: II-XII intact, grossly non-focal.  Mental status: Awake and alert, oriented      CBC   Recent Labs     08/14/21 1950   WBC 5.9   HGB 14.1   HCT 42.4         RENAL  Recent Labs     08/14/21 1950      K 4.9      CO2 18*   BUN 36*   CREATININE 1.4*     LFT'S  Recent Labs     08/14/21 1950   AST 26   ALT 16   BILITOT 0.7   ALKPHOS 71     CARDIAC ENZYMES  Recent Labs     08/14/21 1950 08/14/21 2132   TROPONINI 0.02* 0.02*       U/A:    Lab Results   Component Value Date    NITRITE pos 10/29/2020    COLORU Yellow 08/14/2021    WBCUA 10-20 08/14/2021    RBCUA 3-4 08/14/2021    MUCUS 1+ 01/20/2020    BACTERIA 1+ 08/14/2021    CLARITYU SL CLOUDY 08/14/2021    SPECGRAV 1.010 08/14/2021    LEUKOCYTESUR LARGE 08/14/2021    BLOODU TRACE-LYSED 08/14/2021    GLUCOSEU Negative 08/14/2021    AMORPHOUS 1+ 01/20/2020         Active Hospital Problems    Diagnosis Date Noted    UTI (urinary tract infection) [N39.0] 08/15/2021    Late onset Alzheimer's disease with behavioral disturbance (HCC) [G30.1, F02.81] 05/04/2021    Chronic diastolic CHF (congestive heart failure) ([facility] [I50.32] 09/06/2018    CAD (coronary artery disease) [I25.10]     Atrial fibrillation (HCC) [I48.91]     DM2 (diabetes mellitus, type 2) ([facility] [E11.9] 10/14/2016    Essential hypertension [I10] 10/27/2015         ASSESSMENT/PLAN:  80 y.o. female with history of atrial fibrillation, hypertension, CAD, type 2 diabetes, chronic diastolic heart failure, dementia who lives with her  and son at home and presented with altered mental status than baseline worsening in the evening found to have urinary tract infection complicated by bradycardia in the setting of beta-blocker and sedation. Plan:  -Continue IV antibiotics  -Hold metoprolol and digoxin  -Telemetry  -Cardiology consult  -Get echocardiogram  -Trend troponins  -Continue other chronic home medications      DVT Prophylaxis: Cut enoxaparin  Diet: ADULT DIET; Regular; 4 carb choices (60 gm/meal)  Code Status: Full Code         Lisa Bates MD    Thank you Wayne Ascencio MD for the opportunity to be involved in this patient's care. If you have any questions or concerns please feel free to contact me at [phone].

## 2021-08-15 NOTE — CONSULTS
any symptoms, only states that her feet are cold. In the  emergency room, she has had atrial fibrillation, heart rates around in  the 40s. A 2-second pause was noted on her electrocardiogram.    PAST MEDICAL HISTORY:  As noted above. PAST SURGICAL HISTORY:  Includes cholecystectomy, knee arthroscopy in  the past.    ALLERGIES:  ADHESIVE TAPE, DIRITHROMYCIN. SOCIAL HISTORY:  She lives with her family. No smoking history. No  history of alcohol use. FAMILY HISTORY:  Negative for premature coronary artery disease. REVIEW OF SYSTEMS:  A 14-system review of system is otherwise normal.    PHYSICAL EXAMINATION:  VITAL SIGNS:  When I saw the patient, her heart rate was 50 with atrial  fibrillation, blood pressure 124/58. She is afebrile. Pulse oximetry  95%. GENERAL:  She appears comfortable. HEENT:  Sclerae are clear. She has a left gaze preference. Posterior  pharynx clear. NECK:  Supple. No carotid bruits. Does not appear to have jugular  venous distention. LUNGS:  Lung fields are clear. CARDIOVASCULAR:  Cardiac sounds are notable for irregular rhythm. There  is a soft systolic ejection murmur. ABDOMEN:  Soft, nontender. EXTREMITIES:  Without edema. SKIN:  Warm and dry. NEUROLOGIC:  She is oriented to person only, not to place or time. She  has a right hemiparesis. LABORATORY DATA:  Lab work is reviewed with a creatinine of 1.4, BUN 36,  consistent with a prerenal azotemia. Potassium 4.9. Hemoglobin 14.1. Liver test normal.  Troponin 0.02 which is insignificant. Urinalysis  consistent with acute urinary tract infection. Digoxin level 1.9. IMPRESSION:  Urinary tract infection with dehydration;  Alzheimer's  disease with history of CVA as well; chronic diastolic heart failure;  however, clinically she is hypovolemic currently; apparently, remote  history of coronary artery disease, but not well documented; chronic  atrial fibrillation; diabetes mellitus; hypertension. RECOMMENDATIONS:  Would be to agree with antibiotics. Hold metoprolol  and digoxin. We will also not give antihypertensives at this time, nor  would I give diuretics at this time as she is hypovolemic. We would  reduce dose of Eliquis chronically from 5 mg b.i.d. to 2.5 mg b.i.d.  both due to advanced age and the prerenal azotemia with rise in the  creatinine. Thank you very much for the consultation. We will review echocardiogram  when available. We will follow the patient along with you. I suspect  with measures taken that the patient will show clinical improvement. Due to her advanced dementia, it would be hard press to suggest  aggressive cardiac measures such as pacemaker placement or other more  aggressive cardiac issues; however, we will need to discuss with family  when they become available.     I have spent  70  minutes of face to face time with the patient with more than 50%  spent  counseling and coordinating care for MD Jumana    D: 08/15/2021 8:42:35       T: 08/15/2021 10:22:59     [id number]  Job#: [id number]     Doc#: [id number]    CC:

## 2021-08-15 NOTE — ED NOTES
Pt yelling husbands name. In to see pt and she is asking where her  is.   Told pt that her  and son have gone home for the night because she was admitted to the hospital.     Lulú Cole RN  08/15/21 [id number]

## 2021-08-15 NOTE — ED PROVIDER NOTES
[address] ED      CHIEF COMPLAINT  Altered Mental Status (pt arrived to ED via ems from home for ams, per Glynn López her son Tana Johnson was a couple days ago, he noticed it was hard for her to stay focused and was delayed and symptoms progressed majorly this evening , pt has been able to answer all my questions upon arrival with a little bit of a delay, she is A&O x 4 at this time)       Daniel Marr is a 80 y.o. female with a past medical history of atrial fibrillation on Eliquis, diabetes, dementia, hypertension, and prior stroke with left-sided deficit who presents to the ED complaining of altered mental status. She is here with her  and son who provide much of the history. She lives at home with her . The patient apparently has been more confused over the past several days. The patient denies any symptoms. She is somewhat confused, but able to answer questions. She denies chest pain or shortness of breath. Denies fevers chills or cough. Her  states that she primarily stays in bed and is not able to ambulate as she does have at baseline left-sided weakness from prior stroke. No other complaints, modifying factors or associated symptoms. I have reviewed the following from the nursing documentation.     Past Medical History:   Diagnosis Date    Acute congestive heart failure (HCC)     Atrial fibrillation (HCC)     CAD (coronary artery disease)     Cancer (HCC)     skin cancer    Diabetes mellitus (HCC)     GERD (gastroesophageal reflux disease)     Hemiplegia, unspecified, affecting nondominant side     History of blood transfusion     Hyperlipidemia     Hypertension     Other disorders of kidney and ureter in diseases classified elsewhere     Psychiatric problem     Unspecified cerebral artery occlusion with cerebral infarction 2012    Unspecified cerebral artery occlusion with cerebral infarction      Past Surgical History: Procedure Laterality Date    CHOLECYSTECTOMY      CYSTOSCOPY  08/10/2017    DILATION AND CURETTAGE OF UTERUS  12/06/2016    HEMORRHOID SURGERY      KNEE ARTHROSCOPY      bilateral    SKIN BIOPSY       History reviewed. No pertinent family history. Social History     Socioeconomic History    Marital status:      Spouse name: Not on file    Number of children: Not on file    Years of education: Not on file    Highest education level: Not on file   Occupational History    Not on file   Tobacco Use    Smoking status: Never Smoker    Smokeless tobacco: Never Used   Substance and Sexual Activity    Alcohol use: No    Drug use: No    Sexual activity: Not Currently   Other Topics Concern    Not on file   Social History Narrative    Not on file     Social Determinants of Health     Financial Resource Strain:     Difficulty of Paying Living Expenses:    Food Insecurity:     Worried About Running Out of Food in the Last Year:     [address] in the Last Year:    Transportation Needs:     Lack of Transportation (Medical):      Lack of Transportation (Non-Medical):    Physical Activity:     Days of Exercise per Week:     Minutes of Exercise per Session:    Stress:     Feeling of Stress :    Social Connections:     Frequency of Communication with Friends and Family:     Frequency of Social Gatherings with Friends and Family:     Attends [employer] Services:     Active Member of Clubs or Organizations:     Attends Club or Organization Meetings:     Marital Status:    Intimate Partner Violence:     Fear of Current or Ex-Partner:     Emotionally Abused:     Physically Abused:     Sexually Abused:      Current Facility-Administered Medications   Medication Dose Route Frequency Provider Last Rate Last Admin    0.9 % sodium chloride bolus  1,000 mL Intravenous Once Doyline, DO        cefTRIAXone (ROCEPHIN) 1000 mg IVPB in 50 mL D5W minibag  1,000 mg Intravenous Once Vermillion Prasnal, DO        QUEtiapine (SEROQUEL) tablet 50 mg  50 mg Oral Once [employer], DO         Current Outpatient Medications   Medication Sig Dispense Refill    cephALEXin (KEFLEX) 250 MG capsule TAKE 1 CAPSULE DAILY 90 capsule 0    metFORMIN (GLUCOPHAGE) 500 MG tablet TAKE 1 TABLET DAILY WITH   BREAKFAST 90 tablet 0    tolterodine (DETROL LA) 2 MG extended release capsule TAKE 1 CAPSULE DAILY 90 capsule 0    apixaban (ELIQUIS) 5 MG TABS tablet TAKE 1 TABLET TWICE A  tablet 0    QUEtiapine (SEROQUEL) 50 MG tablet TAKE 1 TABLET TWICE A  tablet 0    potassium chloride (KLOR-CON M20) 20 MEQ extended release tablet TAKE 1 TABLET DAILY 90 tablet 0    escitalopram (LEXAPRO) 20 MG tablet TAKE 1 TABLET DAILY 90 tablet 0    spironolactone-hydroCHLOROthiazide (ALDACTAZIDE) 25-25 MG per tablet TAKE 1 TABLET DAILY 90 tablet 0    furosemide (LASIX) 40 MG tablet TAKE 1 TABLET DAILY 90 tablet 0    metoprolol succinate (TOPROL XL) 50 MG extended release tablet TAKE 1 TABLET DAILY 90 tablet 0    omeprazole (PRILOSEC) 40 MG delayed release capsule TAKE 1 CAPSULE DAILY 90 capsule 0    digoxin (LANOXIN) 125 MCG tablet TAKE 1 TABLET DAILY 90 tablet 0    simvastatin (ZOCOR) 20 MG tablet TAKE 1 TABLET NIGHTLY 90 tablet 0    butalbital-acetaminophen-caffeine (FIORICET, ESGIC) -40 MG per tablet Take 1 tablet by mouth daily as needed for Headaches 90 tablet 0    losartan (COZAAR) 50 MG tablet Take 1 tablet by mouth daily 30 tablet 0    Multiple Vitamins-Minerals-FA (OCUVEL PO) Take 1 tablet by mouth daily      olopatadine (PATADAY) 0.2 % SOLN ophthalmic solution Apply 1 drop to eye daily Both eyes      niacin 500 MG extended release capsule Take 500 mg by mouth nightly      ammonium lactate (LAC-HYDRIN) 12 % lotion Apply topically daily.  (Patient taking differently: Apply topically as needed Apply topically daily.) 120 g 2    Blood Glucose Monitoring Suppl ([address]) W/DEVICE KIT Patient tests once daily. DX:E11.9 1 kit 0    ONE TOUCH LANCETS MISC Patient tests once daily. DX:E11.9 150 each 3    glucose blood VI test strips (ONE TOUCH TEST STRIPS) strip Patient tests once daily. DX: E11.9 150 each 3     Allergies   Allergen Reactions    Tape [Adhesive Tape]     Erythromycin Nausea And Vomiting       REVIEW OF SYSTEMS  10 systems reviewed, pertinent positives per HPI otherwise noted to be negative. PHYSICAL EXAM  /89   Pulse (!) 48   Temp 97.9 °F (36.6 °C) (Oral)   Resp 19   Ht 5' 2\" (1.575 m)   Wt 250 lb (113.4 kg)   SpO2 96%   BMI 45.73 kg/m²    Physical exam:  General appearance: Eyes closed, opens to voice. No distress. Ill appearing. Skin: Warm and dry. No rashes or lesions. HENT: Normocephalic. Atraumatic. Mucus membranes are dry. Neck: supple  Eyes: COURTNEY. EOM intact. Heart: Bradycardic rate. Irregularly irregular rhythm. No murmurs. Lungs: Respirations unlabored. CTAB. No wheezes, rales, or rhonchi. Good air exchange  Abdomen: No tenderness. Soft. Non distended. No peritoneal signs. Musculoskeletal: No extremity edema. Compartments soft. No deformity. No tenderness in the extremities. All extremities neurovascularly intact. Radial, Dp, and PT pulses +2/4 bilaterally  Neurological: Eyes closed but opens eyes to voice and answers questions. Oriented to person and place disoriented to time. Left-sided hemiparesis at baseline. No aphasia or dysarthria. Psychiatric: Normal mood and affect. LABS  I have reviewed all labs for this visit.    Results for orders placed or performed during the hospital encounter of 08/14/21   CBC auto differential   Result Value Ref Range    WBC 5.9 4.0 - 11.0 K/uL    RBC 4.48 4.00 - 5.20 M/uL    Hemoglobin 14.1 12.0 - 16.0 g/dL    Hematocrit 42.4 36.0 - 48.0 %    MCV 94.7 80.0 - 100.0 fL    MCH 31.4 26.0 - 34.0 pg    MCHC 33.1 31.0 - 36.0 g/dL    RDW 13.6 12.4 - 15.4 %    Platelets 493 590 - 749 K/uL    MPV 7.9 5.0 - 10.5 fL    Neutrophils % 36.7 %    Lymphocytes % 44.1 %    Monocytes % 9.5 %    Eosinophils % 8.4 %    Basophils % 1.3 %    Neutrophils Absolute 2.2 1.7 - 7.7 K/uL    Lymphocytes Absolute 2.6 1.0 - 5.1 K/uL    Monocytes Absolute 0.6 0.0 - 1.3 K/uL    Eosinophils Absolute 0.5 0.0 - 0.6 K/uL    Basophils Absolute 0.1 0.0 - 0.2 K/uL   Comprehensive Metabolic Panel w/ Reflex to MG   Result Value Ref Range    Sodium 137 136 - 145 mmol/L    Potassium reflex Magnesium 4.9 3.5 - 5.1 mmol/L    Chloride 105 99 - 110 mmol/L    CO2 18 (L) 21 - 32 mmol/L    Anion Gap 14 3 - 16    Glucose 129 (H) 70 - 99 mg/dL    BUN 36 (H) 7 - 20 mg/dL    CREATININE 1.4 (H) 0.6 - 1.2 mg/dL    GFR Non- 36 (A) >60    GFR  43 (A) >60    Calcium 9.7 8.3 - 10.6 mg/dL    Total Protein 7.3 6.4 - 8.2 g/dL    Albumin 3.8 3.4 - 5.0 g/dL    Albumin/Globulin Ratio 1.1 1.1 - 2.2    Total Bilirubin 0.7 0.0 - 1.0 mg/dL    Alkaline Phosphatase 71 40 - 129 U/L    ALT 16 10 - 40 U/L    AST 26 15 - 37 U/L    Globulin 3.5 g/dL   Troponin   Result Value Ref Range    Troponin 0.02 (H) <0.01 ng/mL   Brain Natriuretic Peptide   Result Value Ref Range    Pro-BNP 1,006 (H) 0 - 449 pg/mL   Lactic acid, plasma   Result Value Ref Range    Lactic Acid 1.4 0.4 - 2.0 mmol/L   Urine, reflex to culture    Specimen: Urine, clean catch   Result Value Ref Range    Color, UA Yellow Straw/Yellow    Clarity, UA SL CLOUDY (A) Clear    Glucose, Ur Negative Negative mg/dL    Bilirubin Urine Negative Negative    Ketones, Urine Negative Negative mg/dL    Specific Gravity, UA 1.010 1.005 - 1.030    Blood, Urine TRACE-LYSED (A) Negative    pH, UA 5.5 5.0 - 8.0    Protein, UA Negative Negative mg/dL    Urobilinogen, Urine 0.2 <2.0 E.U./dL    Nitrite, Urine Negative Negative    Leukocyte Esterase, Urine LARGE (A) Negative    Microscopic Examination YES     Urine Type NotGiven     Urine Reflex to Culture Yes    Troponin   Result Value Ref Range    Troponin 0.02 (H) <0.01 ng/mL   Microscopic Urinalysis   Result Value Ref Range    WBC, UA 10-20 (A) 0 - 5 /HPF    RBC, UA 3-4 0 - 4 /HPF    Epithelial Cells, UA 11-20 (A) 0 - 5 /HPF    Bacteria, UA 1+ (A) None Seen /HPF   Digoxin level   Result Value Ref Range    Digoxin Lvl 1.9 0.8 - 2.0 ng/mL   TSH with Reflex   Result Value Ref Range    TSH 2.15 0.27 - 4.20 uIU/mL       ECG  The Ekg interpreted by me shows  atrial fibrillation with a rate of 65  Axis is   Left axis deviation  QTc is  501  Intervals and Durations show LBBB  ST Segments: no acute change  No significant change from prior EKG dated 1/19/20      RADIOLOGY  CT Head WO Contrast    Result Date: 8/14/2021  EXAMINATION: CT OF THE HEAD WITHOUT CONTRAST  8/14/2021 8:58 pm TECHNIQUE: CT of the head was performed without the administration of intravenous contrast. Dose modulation, iterative reconstruction, and/or weight based adjustment of the mA/kV was utilized to reduce the radiation dose to as low as reasonably achievable. COMPARISON: 09/11/2015 HISTORY: ORDERING SYSTEM PROVIDED HISTORY: confusion TECHNOLOGIST PROVIDED HISTORY: Reason for exam:->confusion Has a \"code stroke\" or \"stroke alert\" been called? ->No Decision Support Exception - unselect if not a suspected or confirmed emergency medical condition->Emergency Medical Condition (MA) Reason for Exam: confusion Acuity: Unknown Type of Exam: Unknown Additional signs and symptoms: AMS, pt unable to give hx FINDINGS: There is extensive amount of encephalomalacia involving the right cerebral hemisphere predominantly involving the right medial occipital lobe right parietal right frontal and right temporal lobe. Findings are likely suggestive of a combination of extensive right MCA and right PCA territory infarctions. There is no acute infarction, intracranial hemorrhage or intracranial mass lesion. No mass effect, midline shift or extra-axial collection is noted. Extensive amount of hypertrophic dural ossification along the inner tables of frontal and temporal bones. There are mild nonspecific foci of periventricular and subcortical cerebral white matter hypodensity, most likely representing chronic microangiopathic disease in this age group. The brain parenchyma is otherwise normal. The cerebellar tonsils are in normal position. The ventricles, sulci, and cisterns are prominent suggestive of moderate generalized volume loss, worsened since prior exam. The globes and orbits are within normal limits. Moderate mucosal thickening of the ethmoidal air cells and mild mucosal thickening the maxillary sinuses. The visualized extracranial structures including paranasal sinuses and mastoid air cells are unremarkable. No fracture is identified. Stable findings related to extensive right MCA and right PCA territory chronic infarctions. No evidence for acute intracranial hemorrhage, acute territorial infarction or intracranial mass lesion. Mild chronic microangiopathic ischemic disease. Moderate generalized volume loss, worsened since prior exam.     XR CHEST PORTABLE    Result Date: 8/14/2021  EXAMINATION: ONE XRAY VIEW OF THE CHEST 8/14/2021 7:35 pm COMPARISON: Chest x-ray 01/19/2020. HISTORY: ORDERING SYSTEM PROVIDED HISTORY: ams TECHNOLOGIST PROVIDED HISTORY: Reason for exam:->ams Reason for Exam: AMS Acuity: Unknown Type of Exam: Unknown Additional signs and symptoms: cough, AMS unable to give hx FINDINGS: Cardiomediastinal silhouette is comparable to prior exam given differences in projection. Lungs demonstrate no focal consolidation or pleural effusion. No pneumothorax appreciated on this projection. Coarse interstitial markings again visualized and may represent chronic changes. Subluxation of the left humeral head again visualized. No airspace disease by radiograph. ED COURSE/MDM  Patient seen and evaluated. Old records reviewed. Labs and imaging reviewed and results discussed with patient.       This is an 80-year-old female presenting with increased confusion over the past several days. Vital signs are within normal limits she is afebrile. On exam, she is chronically ill-appearing, however not acutely toxic. She appears fatigued, however answers questions and opens eyes to voice. She is interacting with her family in the room. Her head CT is negative for acute process. No sign of worsening deficits to suggest stroke as a cause of her confusion. She does appear dehydrated with a BUN of 36 and ROMI with creatinine 1.4. She is given a liter of fluids. She also is noted to have a UTI, is treated with Rocephin. No signs of sepsis, no leukocytosis and lactate is normal at 1.4. Patient's encephalopathy is likely related to metabolic etiology given her dehydration and UTI. I did see that the patient's heart rate was intermittently dropping into the 30s and 40s. It does not appear she has a history of bradycardia to this degree. Blood pressures have been soft but she only had one episode of hypotension and responded to IV fluids. She has a mildly elevated troponin, however I attribute this to her ROMI. Her EKG is nonischemic and unchanged from prior. Given her bradycardia I did call cardiology and I spoke with Dr. Sameer Howell. Digoxin and TSH are added onto the lab work. Patient can be admitted here at [facility] for further trending of her heart rate and treatment of her UTI and dehydration. I spoke with Dr. Rahel Garcia who accepts. During the patient's ED course, the patient was given:  Medications   0.9 % sodium chloride bolus (has no administration in time range)   cefTRIAXone (ROCEPHIN) 1000 mg IVPB in 50 mL D5W minibag (has no administration in time range)   QUEtiapine (SEROQUEL) tablet 50 mg (has no administration in time range)   0.9 % sodium chloride bolus (0 mLs Intravenous Stopped 8/14/21 2220)        CLINICAL IMPRESSION  1. Metabolic encephalopathy    2.  Urinary tract infection without hematuria, site

## 2021-08-15 NOTE — PROGRESS NOTES
Patient seen  Consult to follow  Imp- UTI with dehydration leading to dig excess  Chronic a fib with rates overly controlled  Rec-  Hydration  Hold lanoxin and beta blocker  Reduce eliquis dose  Agree with echo to evaluate aortic stenosis  Hold diuretics and cozaar until hydration and appetite better

## 2021-08-16 LAB
ANION GAP SERPL CALCULATED.3IONS-SCNC: 12 MMOL/L (ref 3–16)
BUN BLDV-MCNC: 21 MG/DL (ref 7–20)
CALCIUM SERPL-MCNC: 8.9 MG/DL (ref 8.3–10.6)
CHLORIDE BLD-SCNC: 106 MMOL/L (ref 99–110)
CO2: 17 MMOL/L (ref 21–32)
CREAT SERPL-MCNC: 0.9 MG/DL (ref 0.6–1.2)
GFR AFRICAN AMERICAN: >60
GFR NON-AFRICAN AMERICAN: 59
GLUCOSE BLD-MCNC: 176 MG/DL (ref 70–99)
LV EF: 63 %
LVEF MODALITY: NORMAL
MAGNESIUM: 1.7 MG/DL (ref 1.8–2.4)
POTASSIUM SERPL-SCNC: 4.7 MMOL/L (ref 3.5–5.1)
SODIUM BLD-SCNC: 135 MMOL/L (ref 136–145)
URINE CULTURE, ROUTINE: NORMAL

## 2021-08-16 PROCEDURE — 97166 OT EVAL MOD COMPLEX 45 MIN: CPT

## 2021-08-16 PROCEDURE — 97530 THERAPEUTIC ACTIVITIES: CPT

## 2021-08-16 PROCEDURE — 1200000000 HC SEMI PRIVATE

## 2021-08-16 PROCEDURE — 93306 TTE W/DOPPLER COMPLETE: CPT

## 2021-08-16 PROCEDURE — 96366 THER/PROPH/DIAG IV INF ADDON: CPT

## 2021-08-16 PROCEDURE — 6370000000 HC RX 637 (ALT 250 FOR IP): Performed by: INTERNAL MEDICINE

## 2021-08-16 PROCEDURE — 36415 COLL VENOUS BLD VENIPUNCTURE: CPT

## 2021-08-16 PROCEDURE — 99232 SBSQ HOSP IP/OBS MODERATE 35: CPT | Performed by: INTERNAL MEDICINE

## 2021-08-16 PROCEDURE — 80048 BASIC METABOLIC PNL TOTAL CA: CPT

## 2021-08-16 PROCEDURE — 6360000002 HC RX W HCPCS: Performed by: INTERNAL MEDICINE

## 2021-08-16 PROCEDURE — 97535 SELF CARE MNGMENT TRAINING: CPT

## 2021-08-16 PROCEDURE — 97112 NEUROMUSCULAR REEDUCATION: CPT

## 2021-08-16 PROCEDURE — 97162 PT EVAL MOD COMPLEX 30 MIN: CPT

## 2021-08-16 PROCEDURE — 83735 ASSAY OF MAGNESIUM: CPT

## 2021-08-16 PROCEDURE — 2580000003 HC RX 258: Performed by: INTERNAL MEDICINE

## 2021-08-16 RX ORDER — OMEPRAZOLE 40 MG/1
CAPSULE, DELAYED RELEASE ORAL
Qty: 90 CAPSULE | Refills: 0 | Status: SHIPPED | OUTPATIENT
Start: 2021-08-16 | End: 2021-11-02

## 2021-08-16 RX ORDER — LOSARTAN POTASSIUM 25 MG/1
25 TABLET ORAL DAILY
Status: DISCONTINUED | OUTPATIENT
Start: 2021-08-17 | End: 2021-08-17 | Stop reason: HOSPADM

## 2021-08-16 RX ORDER — SIMVASTATIN 20 MG
TABLET ORAL
Qty: 90 TABLET | Refills: 0 | Status: SHIPPED | OUTPATIENT
Start: 2021-08-16 | End: 2021-11-02

## 2021-08-16 RX ADMIN — APIXABAN 2.5 MG: 5 TABLET, FILM COATED ORAL at 21:12

## 2021-08-16 RX ADMIN — Medication 10 ML: at 08:45

## 2021-08-16 RX ADMIN — ESCITALOPRAM OXALATE 20 MG: 10 TABLET ORAL at 08:45

## 2021-08-16 RX ADMIN — MAGNESIUM GLUCONATE 500 MG ORAL TABLET 400 MG: 500 TABLET ORAL at 14:58

## 2021-08-16 RX ADMIN — ATORVASTATIN CALCIUM 10 MG: 10 TABLET, FILM COATED ORAL at 08:45

## 2021-08-16 RX ADMIN — QUETIAPINE FUMARATE 50 MG: 25 TABLET ORAL at 08:45

## 2021-08-16 RX ADMIN — SODIUM CHLORIDE: 9 INJECTION, SOLUTION INTRAVENOUS at 01:16

## 2021-08-16 RX ADMIN — PANTOPRAZOLE SODIUM 40 MG: 40 TABLET, DELAYED RELEASE ORAL at 05:37

## 2021-08-16 RX ADMIN — TROSPIUM CHLORIDE 20 MG: 20 TABLET, FILM COATED ORAL at 21:12

## 2021-08-16 RX ADMIN — Medication 10 ML: at 21:13

## 2021-08-16 RX ADMIN — CEFTRIAXONE SODIUM 1000 MG: 1 INJECTION, POWDER, FOR SOLUTION INTRAMUSCULAR; INTRAVENOUS at 01:40

## 2021-08-16 RX ADMIN — QUETIAPINE FUMARATE 50 MG: 25 TABLET ORAL at 21:12

## 2021-08-16 RX ADMIN — APIXABAN 2.5 MG: 5 TABLET, FILM COATED ORAL at 08:45

## 2021-08-16 ASSESSMENT — PAIN SCALES - GENERAL
PAINLEVEL_OUTOF10: 0

## 2021-08-16 NOTE — ACP (ADVANCE CARE PLANNING)
Advance Care Planning   Healthcare Decision Maker:    Primary Decision Maker: Bubba Alejandro - Child - [phone]    Click here to complete Healthcare Decision Makers including selection of the Healthcare Decision Maker Relationship (ie \"Primary\").

## 2021-08-16 NOTE — ACP (ADVANCE CARE PLANNING)
Advance Care Planning   Healthcare Decision Maker:    Primary Decision Maker: Kathleen Chas - Child - [phone]    Click here to complete Healthcare Decision Makers including selection of the Healthcare Decision Maker Relationship (ie \"Primary\").

## 2021-08-16 NOTE — CARE COORDINATION
Case Management Assessment  Initial Evaluation      Patient Name: [place]  YOB: 1933  Diagnosis: Metabolic encephalopathy [G57.55]  Dehydration [E86.0]  Bradycardia [R00.1]  UTI (urinary tract infection) [N39.0]  ROMI (acute kidney injury) ([facility] [address] [N17.9]  Urinary tract infection without hematuria, site unspecified [N39.0]  Date / Time: 8/14/2021  7:07 PM    Admission status/Date: 8/15/21 inpt  Chart Reviewed: Yes      Patient Interviewed: Yes   Family Interviewed:  Yes - Katrin      Hospitalization in the last 30 days:  No      Health Care Decision Maker :   Primary Decision Maker: Amparo Oliveira  Child - [phone]    (CM - must 1st enter selection under Navigator - emergency contact- Health Care Decision Maker Relationship and pick relationship)   Who do you trust or have selected to make healthcare decisions for you      Met with: patient  Emilia Euceda conducted  (bedside/phone):    Current PCP: [facility] [address] required for SNF : Y          3 night stay required -  [facility]  Support Systems/Care Needs: Spouse/Significant Other  Transportation: family    Meal Preparation: Self    Housing  Living Arrangements: lives home with spouse  Steps:   Intent for return to present living arrangements: Yes  Identified Issues:     [address] 78  Active with [address] : No Agency:(Services)  Type of Home Care Services: None  Passport/Waiver : No  :                      Phone Number:    Passport/Waiver Services: N/A          Durable Medical Equiptment   DME Provider:   Equipment:   Walker_x__Cane___RTS___ BSC_x__Shower Chair___Hospital Bed_x__W/C_x___Other________  02 at ____Liter(s)---wears(frequency)_______ HHN ___ CPAP___ BiPap___   N/A____      Home O2 Use :  No    If No for home O2---if presently on O2 during hospitalization:  Yes  if yes CM to follow for potential DC O2 need  Informed of need for care provider to bring portable home O2 tank on day of discharge for nursing to connect prior to leaving:   Not Indicated  Verbalized agreement/Understanding:   Not Indicated    Community Service Affiliation  Dialysis:  No    · Agency:  · Location:  · Dialysis Schedule:  · Phone:   · Fax: Other Community Services: (ex:PT/OT,Mental Health,Wound Clinic, Cardio/Pul [employer] [employer]) None    DISCHARGE PLAN: Explained Case Management role/services. Reviewed chart and met with pt at bedside. Pt sl confused. Request writer speak with darrius Marie to complete assessment. Spoke with Erica Marie who states pt lives home with Spouse but her brother is DPOA  As  Pt  Spouse is [place]. [facility] pt has [employer] pvt pay 5 days per week for 4-5 hrs per day. Denies other in home services. States she lives next door to pt and family assist as needed. [facility] plan will be for home with current  Services. Also spoke with with Danielle Gifford who is DPOA and he confirms the above info. Plan will be home and poss [place]. Will cont to follow.

## 2021-08-16 NOTE — PROGRESS NOTES
Progress Note    Admit Date:  8/14/2021      Subjective:  Ms. Estephanie Barnes is doing well today. she has underlying dementia , she is in no distress. she is communicating well . O2 sat stable on room air . [facility] Telemetry episodes of nonsustained V. Tach today . patient is afebrile ,  urine cultures no growth . .    Objective:   /68   Pulse 82   Temp 98.1 °F (36.7 °C) (Oral)   Resp 16   Ht 5' 2\" (1.575 m)   Wt 248 lb 6.4 oz (112.7 kg)   SpO2 98%   BMI 45.43 kg/m²     Intake/Output Summary (Last 24 hours) at 8/16/2021 1507  Last data filed at 8/16/2021 0143  Gross per 24 hour   Intake 1085.17 ml   Output 525 ml   Net 560.17 ml         Physical Exam:  General appearance: No apparent distress appears stated age and cooperative. HEENT Normal cephalic, atraumatic without obvious deformity. Pupils equal, round, and reactive to light. Extra ocular muscles intact. Conjunctivae/corneas clear. Neck: Supple, No jugular venous distention/bruits. Lungs: Clear to auscultation, bilaterally without Rales/Wheezes/Rhonchi with good respiratory effort. Heart: Regular rate and rhythm with Normal S1/S2 without murmurs, rubs or gallops, point of maximum impulse non-displaced  Abdomen: Soft, non-tender or non-distended without rigidity or guarding and positive bowel sounds  Extremities: No clubbing, cyanosis, or edema bilaterally. Full range of motion without deformity and normal gait intact. Skin: Skin color, texture, turgor normal.  No rashes or lesions. Neurologic: Alert and oriented X 3, neurovascularly intact with sensory/motor intact upper extremities/lower extremities, bilaterally.   Cranial nerves: II-XII intact, grossly non-focal.  Mental status: Awake and alert, oriented X2      Medications:   Scheduled Meds:   magnesium oxide  400 mg Oral Daily    [START ON 8/17/2021] losartan  25 mg Oral Daily    escitalopram  20 mg Oral Daily    pantoprazole  40 mg Oral QAM AC    QUEtiapine  50 mg Oral BID    atorvastatin  10 mg Oral Daily    sodium chloride flush  5-40 mL Intravenous 2 times per day    cefTRIAXone (ROCEPHIN) IV  1,000 mg Intravenous Q24H    spironolactone  25 mg Oral Daily    trospium  20 mg Oral Nightly    apixaban  2.5 mg Oral BID       Continuous Infusions:   sodium chloride         Data:  CBC:   Recent Labs     08/14/21  1950 08/15/21  0625   WBC 5.9 6.4   RBC 4.48 4.33   HGB 14.1 13.6   HCT 42.4 40.7   MCV 94.7 94.1   RDW 13.6 13.7    175     BMP:   Recent Labs     08/14/21  1950 08/15/21  0625 08/16/21  1042    139 135*   K 4.9 4.5 4.7    108 106   CO2 18* 18* 17*   BUN 36* 33* 21*   CREATININE 1.4* 1.2 0.9     BNP: No results for input(s): BNP in the last 72 hours. PT/INR: No results for input(s): PROTIME, INR in the last 72 hours. APTT: No results for input(s): APTT in the last 72 hours. CARDIAC ENZYMES:   Recent Labs     08/14/21  1950 08/14/21  2132 08/15/21  0625   TROPONINI 0.02* 0.02* 0.02*     FASTING LIPID PANEL:  Lab Results   Component Value Date    CHOL 129 08/26/2018    HDL 43 10/29/2020    TRIG 144 08/26/2018     LIVER PROFILE:   Recent Labs     08/14/21 1950   AST 26   ALT 16   BILITOT 0.7   ALKPHOS 71      Urine cultures  No growth      Radiology  CT Head WO Contrast   Final Result      Stable findings related to extensive right MCA and right PCA territory   chronic infarctions. No evidence for acute intracranial hemorrhage, acute territorial infarction   or intracranial mass lesion. Mild chronic microangiopathic ischemic disease. Moderate generalized volume loss, worsened since prior exam.         XR CHEST PORTABLE   Final Result   No airspace disease by radiograph.                Assessment:  Principal Problem:    UTI (urinary tract infection)  Active Problems:    Essential hypertension    DM2 (diabetes mellitus, type 2) (HCC)    CAD (coronary artery disease)    Atrial fibrillation (HCC)    Chronic diastolic CHF (congestive heart failure) ([facility]    Late onset Alzheimer's disease with behavioral disturbance ([facility]  Resolved Problems:    * No resolved hospital problems. *      Plan:    80 y.o. female with history of atrial fibrillation, hypertension, CAD, type 2 diabetes, chronic diastolic heart failure, dementia who lives with her  and son at home and presented with altered mental status than baseline worsening in the evening found to have urinary tract infection complicated by bradycardia in the setting of beta-blocker and sedation.       UTI  - cultures are negative. she is on day 2 of 3 days of antibiotics Rocephin. Dehydration   -hydrated with IV fluids. Resume Aldactone tomorrow, discontinue Lasix. Valvular heart disease  Chr cavazos CHF  - echocardiogram completed, shows severe tricuspid regurgitation. Resume aldactone     A. fib , nonsustained V. tach , bradyarrythmia   -seen in consultation by cardiology   - hold BB, digoxin   eliquis dose decreased     Mild hypomagnesemia   -repleted    DM -2  Monitor BS      HTN  bp stable     Generalized weakness   -seen by PT OT patient will need SNF placement    Dementia  H/O CVA      Diet: ADULT DIET;  Regular; 4 carb choices (60 gm/meal)  Code Status: Full Code    Beto Hurst MD, MD 8/16/2021 3:07 PM

## 2021-08-16 NOTE — PROGRESS NOTES
Inpatient Physical Therapy Evaluation and Treatment    Unit: [facility]  Date:  8/16/2021  Patient Name:    [place]  Admitting diagnosis:  Metabolic encephalopathy [N03.95]  Dehydration [E86.0]  Bradycardia [R00.1]  UTI (urinary tract infection) [N39.0]  ROMI (acute kidney injury) ([facility] [N17.9]  Urinary tract infection without hematuria, site unspecified [N39.0]  Admit Date:  8/14/2021  Precautions/Restrictions/WB Status/ Lines/ Wounds/ Oxygen: Fall risk, Bed/chair alarm, Lines -IV and Purewick catheter, Confusion, [place] (hard of hearing) and Telemetry, monitor HR    Treatment Time: 0926 - 1005  Treatment Number:  1   Timed Code Treatment Minutes: 29 minutes  Total Treatment Minutes:  39  minutes    Patient Goals for Therapy: Did not state goals. Discharge Recommendations: SNF (if declined home with 24hr assist with home PT)  DME needs for discharge: defer to facility       Therapy recommendation for EMS Transport: requires transport by cot due to need of lift equipment for functional transfers    Therapy recommendations for staff:   Assist of 2 with use of MAXI-Move for all transfers to/from chair    History of Present Illness: H & p as per Iver Homans, MD's note dated 8/15/2021:  The patient is a 80 y.o. female with history of atrial fibrillation, hypertension, CAD, type 2 diabetes, chronic diastolic heart failure, dementia who lives with her  and son at home and presented with altered mental status than baseline worsening in the evening. She denies any fevers or chills. No dysuria or hematuria. No cough or production. In the ER, urinalysis was positive for UTI  She was noted to be bradycardic into the 40s in the setting of beta-blocker use and digoxin. ER discussed with cardiology who recommended admission to [facility] and holding insulting medications.   CT head did not reveal any acute pathology  Chest x-ray with no airspace disease  ASSESSMENT/PLAN:  80 y.o. female with history of atrial fibrillation, hypertension, CAD, type 2 diabetes, chronic diastolic heart failure, dementia who lives with her  and son at home and presented with altered mental status than baseline worsening in the evening found to have urinary tract infection complicated by bradycardia in the setting of beta-blocker and sedation. Plan:  -Continue IV antibiotics  -Hold metoprolol and digoxin  -Telemetry  -Cardiology consult  -Get echocardiogram  -Trend troponins  -Continue other chronic home medications    Home Health S4 Level Recommendation:  Level 3 Safety  AM-PAC Mobility Score    AM-PAC Inpatient Mobility Raw Score : 9  AM-PAC Inpatient Mobility without Stair Climbing Raw Score : 8    Preadmission Environment  (Patient was a poor historian and following information was gathered from PT evaluation dated 8/28/2018)  Pt. Lives with spouse  Home environment:  two story home  Steps to enter first floor: ramp  Steps to second floor: N/A  Bathroom: Patient uses bedpan at home  Equipment owned: [address] [place] [address],  (manual), [facility] [place], hospital bed and tahira lift, scooter, [employer] with W/C ramp    Preadmission Status:  Pt. Able to drive: No  Pt Fully independent with ADLs: No  Pt. Required assistance from family for: Bathing, Cleaning, Cooking, Dressing and Laundry   Pt. dependent for transfers and gait and walked with No Device (as per patient she had not been walking prior to hospitalization. History of falls Unknown    Pain   No    Cognition    A&O Person, disoriented to place, situation and today's date. Able to follow 1 step commands    Subjective  Patient lying supine in bed with no family present. Pt agreeable to this PT eval & tx. Upper Extremity ROM/Strength  Please see OT evaluation.       Lower Extremity ROM / Strength   AROM WFL: Yes  ROM limitations: N/A    Strength Assessment (measured on a 0-5 scale):  R LE   Quad   3-   Ant Tib  3-   Hamstring 3-   Iliopsoas 3-  L LE  Quad   0   Ant Tib 0   Hamstring 0   Iliopsoas 0    Lower Extremity Sensation    Impaired    Lower Extremity Proprioception:   Impaired     Coordination and Tone  Impaired on L UE and L LE    Balance  Sitting:  Poor+ to fair -; CGA  Comments: 15 min    Standing: Poor; Max A  and 2 persons  Comments: ~30 sec x 3 reps with STEDY    Bed Mobility   Supine to Sit:    Max A  and 2 persons  Sit to Supine:   Not Tested  Rolling:   Not Tested  Scooting in sitting: Max A  and 2 persons  Scooting in supine:  Max A  and 2 persons    Transfer Training  (Patient was unable to use RUE to push up from chair. Patient used to bear hug method of sit to stand)   Sit to stand: Max A  and 2 persons  Stand to sit: Max A  and 2 persons  Bed to Chair:   Total A and 2 persons with use of gait belt and CONCHITA STEDY    Gait pt is non-ambulatory at baseline; pt ambulated 0 ft. Stair Training deferred, pt does not have stairs in home environment    Activity Tolerance   Pt completed therapy session with SOB noted with all functional mobility  SpO2: 95% at rest, with activity 92-93%  HR: at rest 95 bpm, with functional transfers 160 bpm  BP at the end of the session: 145/67  RN aware about the treatment response. Positioning Needs   Pt up in chair, alarm set, positioned in proper neutral alignment and pressure relief provided. Call light provided and all needs within reach    Exercises Initiated  all completed bilaterally unless indicated  weight shifting during standing to improve postural stability during standing. Other  None. Patient/Family Education   Pt educated on role of inpatient PT, POC, importance of continued activity, DC recommendations, safety awareness, transfer techniques, pursed lip breathing, energy conservation, pacing activity and calling for assist with mobility. Assessment  Pt seen for Physical Therapy evaluation in acute care setting.   Pt demonstrated decreased Activity tolerance, Balance, ROM, Safety and Strength as

## 2021-08-16 NOTE — PROGRESS NOTES
Inpatient Occupational Therapy  Evaluation and Treatment    Unit: [facility]  Date:  8/16/2021  Patient Name:    [place]  Admitting diagnosis:  Metabolic encephalopathy [Q45.83]  Dehydration [E86.0]  Bradycardia [R00.1]  UTI (urinary tract infection) [N39.0]  ROMI (acute kidney injury) ([facility] [N17.9]  Urinary tract infection without hematuria, site unspecified [N39.0]  Admit Date:  8/14/2021  Precautions/Restrictions/WB Status/ Lines/ Wounds/ Oxygen: fall risk, IV and bed/chair alarm, h/o CVA with L sided weakness    Treatment Time:  9:23-10:05  Treatment Number: 1     Billable Treatment Time: 23 minutes   Total Treatment Time:  43  minutes    Patient Goals for Therapy:  \" to go home \"      Discharge Recommendations: SNF  DME needs for discharge: defer to facility       Therapy recommendations for staff:   Assist of 2 with use of MAXI-Move for all transfers to/from chair    History of Present Illness:  H & p as per Iver Homans, MD's note dated 8/15/2021:  The patient is a 80 y. o. female with history of atrial fibrillation, hypertension, CAD, type 2 diabetes, chronic diastolic heart failure, dementia who lives with her  and son at home and presented with altered mental status than baseline worsening in the evening.  She denies any fevers or chills.  No dysuria or hematuria.  No cough or production. In the ER, urinalysis was positive for UTI  She was noted to be bradycardic into the 40s in the setting of beta-blocker use and digoxin.  ER discussed with cardiology who [facility] and holding insulting medications. CT head did not reveal any acute pathology  Chest x-ray with no airspace disease  ASSESSMENT/PLAN:  80 y. o. female with history of atrial fibrillation, hypertension, CAD, type 2 diabetes, chronic diastolic heart failure, dementia who lives with her  and son at home and presented with altered mental status than baseline worsening in the evening found to have urinary tract infection complicated by bradycardia in the setting of beta-blocker and sedation. Plan:  -Continue IV antibiotics  -Hold metoprolol and digoxin  -Telemetry  -Cardiology consult  -Get echocardiogram  -Trend troponins  -Continue other chronic home medications    Home Health S4 Level Recommendation:  Level 3 Safety  AM-PAC Score: AM-PAC Inpatient Daily Activity Raw Score: 13    Preadmission Environment    Pt. Lives with  ( reports balance issues 2/2 inner ear condition)  Home environment: two story home. Steps to enter first floor:     Steps to second floor: pt does not access second floor  Bathroom: Uses bedpan with assistance from  for toilet hygiene  Equipment owned: w/c, cane, walker, scooter, tahira lift, hospital bed, [employer] with w/c ramp    Preadmission Status / PLOF:  Pt currently uses bed pan for toileting at home. Pt reports recieving bathing assistance from aid 2-3 times per week, has assistance for laundry and light housekeeping 3x per week. Pt. Has assistance from  for transfers and propulsion of w/c. Pt is fully dependent for mobility with w/c 2/2 L UE hemiplegia.  reports primary mode of transfer pt to/from bed<>w/c with stand pivot with L LE blocked, pt able to assist with use of R LE.  reports use of tahira for transfers from floor. Pt has outside assistance from agency for bathing, laundry, housekeeping  Pt reports regularly completing exercises EOB and supine for L UE and LE with assistance from   Not receiving home PT at this time due to insurance coverage      Pain  No  Rating:NA  Location:  Pain Medicine Status: Denies need      Cognition    A&O Person, disoriented to place, situation, and date   Able to follow 2 step commands    Subjective  Patient lying supine in bed with no family present. Pt agreeable to this OT eval & tx.      Upper Extremity ROM:    L UE impaired throughout from CVA  R shoulder flexion impaired to 90 degrees    Upper Extremity Strength:    Strength Assessment (measured on a 0-5 scale):    R UE   Shoulder flexion  3     Shoulder extension 3  Shoulder abduction 3   Elbow flexion  3   Elbow extension 3    L UE  Shoulder flexion  0     Shoulder extension 0  Shoulder abduction 0   Elbow flexion  0   Elbow extension 0   Wrist flexion  0   Wrist extension 0      Impaired    Upper Extremity Sensation    Impaired    Upper Extremity Proprioception:  Impaired    Coordination and Tone  Impaired    Balance  Functional Sitting Balance:  Impaired (CGA while seated at EOB)   Functional Standing Balance:Impaired (mod A for standing in STEDY)    Bed mobility:    Supine to sit:   Mod A of 2  Sit to supine:   Not Tested  Rolling:    Not Tested  Scooting in sitting: Mod A of 2  Scooting to head of bed:   Not Tested    Bridging:   Not Tested    Transfers:    Sit to stand: Max A of 2  Stand to sit: Max A of 2  Bed to chair:   Total A via Stedy (anteriorly leaning)   Standard toilet: Not Tested  Bed to BSC:  Total A via Stedy    Dressing:      UE:   Not Tested  LE:    Not Tested    Bathing:    UE:  Not Tested  LE:  Not Tested    Eating:   Not Tested    Toileting: Total A     Activity Tolerance   Pt completed therapy session with No adverse symptoms noted w/activity  SpO2: 95%  HR:  during session  BP:     Positioning Needs:   Up in chair, call light and needs in reach. Alarm Set    Exercise / Activities Initiated:   N/A    Patient/Family Education:   Role of OT  Recommendations for DC    Assessment of Deficits: Pt seen for Occupational therapy evaluation in acute care setting. Pt demonstrated decreased Activity tolerance, ADLs, IADLs, Balance , Bathing, Bed mobility, Dressing, ROM, Safety Awareness, Strength, Transfers, Cognition and Coping Skills. Pt functioning below baseline and will likely benefit from skilled occupational therapy services to maximize safety and independence. Goal(s) :    To be met in 3 Visits:  1). Bed to toilet/BSC: Total A via Stedy    To be met in 5 Visits:  1). Supine to/from Sit:  Mod A of 2  2). Upper Body Bathing: Mod A  3). Lower Body Bathing:   Max A  4). Upper Body Dressing: Mod A  5). Lower Body Dressing:  Max A  6). Pt to demonstrate UE exs x 15 reps with minimal cues    Rehabilitation Potential:  Fair for goals listed above. Strengths for achieving goals include: Pt cooperative  Barriers to achieving goals include:  Complexity of condition     Plan: To be seen 3-5 x/wk while in acute care setting for therapeutic exercises, bed mobility, transfers, dressing, bathing, family/patient education, ADL/IADL retraining, energy conservation training.        Nadeem Stovall OTR/L #[id number]      If patient discharges from this facility prior to next visit, this note will serve as the Discharge Summary

## 2021-08-16 NOTE — FLOWSHEET NOTE
08/16/21 0830   Oxygen Therapy   SpO2 99 %   Pulse Oximeter Device Location Right;Finger   O2 Device Nasal cannula   O2 Flow Rate (L/min) 1 L/min

## 2021-08-16 NOTE — PLAN OF CARE
Problem: Falls - Risk of:  Goal: Will remain free from falls  Description: Will remain free from falls  Outcome: Ongoing  Goal: Absence of physical injury  Description: Absence of physical injury  Outcome: Ongoing     Problem: Gas Exchange - Impaired:  Goal: Levels of oxygenation will improve  Description: Levels of oxygenation will improve  Outcome: Ongoing     Problem: Skin Integrity:  Goal: Will show no infection signs and symptoms  Description: Will show no infection signs and symptoms  Outcome: Ongoing  Goal: Absence of new skin breakdown  Description: Absence of new skin breakdown  Outcome: Ongoing     Problem: Confusion - Acute:  Goal: Absence of continued neurological deterioration signs and symptoms  Description: Absence of continued neurological deterioration signs and symptoms  Outcome: Ongoing  Goal: Mental status will be restored to baseline  Description: Mental status will be restored to baseline  Outcome: Ongoing     Problem: Discharge Planning:  Goal: Ability to perform activities of daily living will improve  Description: Ability to perform activities of daily living will improve  Outcome: Ongoing  Goal: Participates in care planning  Description: Participates in care planning  Outcome: Ongoing     Problem: Injury - Risk of, Physical Injury:  Goal: Will remain free from falls  Description: Will remain free from falls  Outcome: Ongoing  Goal: Absence of physical injury  Description: Absence of physical injury  Outcome: Ongoing     Problem: Mood - Altered:  Goal: Mood stable  Description: Mood stable  Outcome: Ongoing  Goal: Absence of abusive behavior  Description: Absence of abusive behavior  Outcome: Ongoing  Goal: Verbalizations of feeling emotionally comfortable while being cared for will increase  Description: Verbalizations of feeling emotionally comfortable while being cared for will increase  Outcome: Ongoing     Problem: Psychomotor Activity - Altered:  Goal: Absence of psychomotor disturbance signs and symptoms  Description: Absence of psychomotor disturbance signs and symptoms  Outcome: Ongoing     Problem: Sensory Perception - Impaired:  Goal: Demonstrations of improved sensory functioning will increase  Description: Demonstrations of improved sensory functioning will increase  Outcome: Ongoing  Goal: Decrease in sensory misperception frequency  Description: Decrease in sensory misperception frequency  Outcome: Ongoing  Goal: Able to refrain from responding to false sensory perceptions  Description: Able to refrain from responding to false sensory perceptions  Outcome: Ongoing  Goal: Demonstrates accurate environmental perceptions  Description: Demonstrates accurate environmental perceptions  Outcome: Ongoing  Goal: Able to distinguish between reality-based and nonreality-based thinking  Description: Able to distinguish between reality-based and nonreality-based thinking  Outcome: Ongoing  Goal: Able to interrupt nonreality-based thinking  Description: Able to interrupt nonreality-based thinking  Outcome: Ongoing     Problem: Sleep Pattern Disturbance:  Goal: Appears well-rested  Description: Appears well-rested  Outcome: Ongoing

## 2021-08-17 VITALS
DIASTOLIC BLOOD PRESSURE: 70 MMHG | HEIGHT: 62 IN | BODY MASS INDEX: 45.71 KG/M2 | HEART RATE: 76 BPM | RESPIRATION RATE: 16 BRPM | OXYGEN SATURATION: 94 % | TEMPERATURE: 98.5 F | WEIGHT: 248.4 LBS | SYSTOLIC BLOOD PRESSURE: 146 MMHG

## 2021-08-17 PROCEDURE — 6370000000 HC RX 637 (ALT 250 FOR IP): Performed by: INTERNAL MEDICINE

## 2021-08-17 PROCEDURE — 99239 HOSP IP/OBS DSCHRG MGMT >30: CPT | Performed by: INTERNAL MEDICINE

## 2021-08-17 PROCEDURE — 96366 THER/PROPH/DIAG IV INF ADDON: CPT

## 2021-08-17 PROCEDURE — 6360000002 HC RX W HCPCS: Performed by: INTERNAL MEDICINE

## 2021-08-17 PROCEDURE — 2580000003 HC RX 258: Performed by: INTERNAL MEDICINE

## 2021-08-17 RX ORDER — LOSARTAN POTASSIUM 50 MG/1
25 TABLET ORAL DAILY
COMMUNITY
Start: 2021-08-17

## 2021-08-17 RX ORDER — TOLTERODINE 2 MG/1
CAPSULE, EXTENDED RELEASE ORAL
COMMUNITY
Start: 2021-08-17 | End: 2021-10-27

## 2021-08-17 RX ADMIN — Medication 10 ML: at 09:04

## 2021-08-17 RX ADMIN — ATORVASTATIN CALCIUM 10 MG: 10 TABLET, FILM COATED ORAL at 09:03

## 2021-08-17 RX ADMIN — CEFTRIAXONE SODIUM 1000 MG: 1 INJECTION, POWDER, FOR SOLUTION INTRAMUSCULAR; INTRAVENOUS at 02:17

## 2021-08-17 RX ADMIN — LOSARTAN POTASSIUM 25 MG: 25 TABLET, FILM COATED ORAL at 09:03

## 2021-08-17 RX ADMIN — PANTOPRAZOLE SODIUM 40 MG: 40 TABLET, DELAYED RELEASE ORAL at 06:40

## 2021-08-17 RX ADMIN — SPIRONOLACTONE 25 MG: 25 TABLET ORAL at 09:03

## 2021-08-17 RX ADMIN — ESCITALOPRAM OXALATE 20 MG: 10 TABLET ORAL at 09:03

## 2021-08-17 RX ADMIN — MAGNESIUM GLUCONATE 500 MG ORAL TABLET 400 MG: 500 TABLET ORAL at 09:03

## 2021-08-17 RX ADMIN — APIXABAN 2.5 MG: 5 TABLET, FILM COATED ORAL at 09:04

## 2021-08-17 RX ADMIN — QUETIAPINE FUMARATE 50 MG: 25 TABLET ORAL at 09:03

## 2021-08-17 ASSESSMENT — PAIN SCALES - GENERAL: PAINLEVEL_OUTOF10: 0

## 2021-08-17 NOTE — PROGRESS NOTES
Pt resting with eyes closed, respirs witnessed as e/e, no signs of distress. SR up x2, bed in lowest  position, wheels locked,bed alarm on, Call light and bedside table in easy reach.    Joao Tucker, RN, RN

## 2021-08-17 NOTE — PROGRESS NOTES
Prescriptions and discharge instructions given to pt and family. Pt's family verbalized understanding/ denies questions/ needs at this time. Pt transported to vehicle for discharge home.

## 2021-08-17 NOTE — DISCHARGE SUMMARY
Name:  Mile Law  Room:  1813/6251-06  MRN:    [id number]    Discharge Summary      This discharge summary is in conjunction with a complete physical exam done on the day of discharge. Discharging Physician: Dr. Man Heads: 8/14/2021  Discharge:  8/17/2021    HPI taken from admission H&P:      The patient is a 80 y.o. female with history of atrial fibrillation, hypertension, CAD, type 2 diabetes, chronic diastolic heart failure, dementia who lives with her  and son at home and presented with altered mental status than baseline worsening in the evening. She denies any fevers or chills. No dysuria or hematuria. No cough or production. In the ER, urinalysis was positive for UTI  She was noted to be bradycardic into the 40s in the setting of beta-blocker use and digoxin. ER discussed with cardiology who recommended admission to [facility] and holding insulting medications. CT head did not reveal any acute pathology  Chest x-ray with no airspace disease     Diagnoses this Admission and Hospital Course     80 y. o. female with history of atrial fibrillation, hypertension, CAD, type 2 diabetes, chronic diastolic heart failure, dementia who lives with her  and son at home and presented with altered mental status than baseline worsening in the evening found to have urinary tract infection complicated by bradycardia in the setting of beta-blocker and sedation.     UTI  - cultures negative. she completed Rocephin.      Dehydration   -hydrated with IV fluids. Resumed Aldactone , discontinued Lasix. - stopped lasix at d/c     Valvular heart disease  Chr cavazos CHF  - echocardiogram completed, showed severe tricuspid regurgitation.   Resume aldactone      A. fib , nonsustained V. tach , bradyarrythmia   - seen in consultation by cardiology   - held BB, digoxin   eliquis dose decreased   - stopped digoxin and BB at d/c  - continued Eliquis at d/c     Mild hypomagnesemia -repleted  - discharged with Mg Ox     DM -2  Monitored BS   Stopped metformin at d/c      HTN  bp stable     Dementia  H/O CVA       Generalized weakness   -seen by PT OT     Discussed with patient's  and son  at bedside . atient's mental status is back to baseline ,they prefer taking her home with home health care. patient may not do well in a different setting. .   wants her home they believe they have enough help at home and want home health care set up and home PT OT set up. Discharge today    Procedures (Please Review Full Report for Details)  N/A    Consults    Cardiology    Physical Exam at Discharge:    BP (!) 146/70   Pulse 76   Temp 98.5 °F (36.9 °C) (Oral)   Resp 16   Ht 5' 2\" (1.575 m)   Wt 248 lb 6.4 oz (112.7 kg)   SpO2 94%   BMI 45.43 kg/m²   General appearance: No apparent distress appears stated age and cooperative. HEENT Normal cephalic, atraumatic without obvious deformity.  Pupils equal, round, and reactive to light.  Extra ocular muscles intact.  Conjunctivae/corneas clear. Neck: Supple, No jugular venous distention/bruits.    Lungs: Clear to auscultation, bilaterally without Rales/Wheezes/Rhonchi with good respiratory effort. Heart: Regular rate and rhythm with Normal S1/S2 without murmurs, rubs or gallops, point of maximum impulse non-displaced  Abdomen: Soft, non-tender or non-distended without rigidity or guarding and positive bowel sounds  Extremities: No clubbing, cyanosis, or edema bilaterally.  Full range of motion without deformity and normal gait intact. Skin: Skin color, texture, turgor normal.  No rashes or lesions.   Neurologic: Alert and oriented X 3, neurovascularly intact with sensory/motor intact upper extremities/lower extremities, bilaterally.  Cranial nerves: II-XII intact, grossly non-focal.  Mental status: Awake and alert, oriented  to person    CBC:   Recent Labs     08/14/21  1950 08/15/21  0625   WBC 5.9 6.4   HGB 14.1 13.6   HCT 42.4 40.7 MCV 94.7 94.1    175     BMP:   Recent Labs     08/14/21  1950 08/15/21  0625 08/16/21  1042    139 135*   K 4.9 4.5 4.7    108 106   CO2 18* 18* 17*   BUN 36* 33* 21*   CREATININE 1.4* 1.2 0.9     LIVER PROFILE:   Recent Labs     08/14/21  1950   AST 26   ALT 16   BILITOT 0.7   ALKPHOS 71     UA:  Recent Labs     08/14/21  2131   COLORU Yellow   PHUR 5.5   WBCUA 10-20*   RBCUA 3-4   BACTERIA 1+*   CLARITYU SL CLOUDY*   SPECGRAV 1.010   LEUKOCYTESUR LARGE*   UROBILINOGEN 0.2   BILIRUBINUR Negative   BLOODU TRACE-LYSED*   GLUCOSEU Negative     CULTURES    Urine cx: NGTD    RADIOLOGY    CT Head WO Contrast   Final Result      Stable findings related to extensive right MCA and right PCA territory   chronic infarctions. No evidence for acute intracranial hemorrhage, acute territorial infarction   or intracranial mass lesion. Mild chronic microangiopathic ischemic disease. Moderate generalized volume loss, worsened since prior exam.         XR CHEST PORTABLE   Final Result   No airspace disease by radiograph. TTE 8/17/2021  Summary   Left ventricular systolic function is normal with ejection fraction   estimated at 60-65 %. No regional wall motion abnormalities are noted. Elevated left ventricular diastolic filling pressure: Septal E/e'' = 16.6 . The right ventricle is mildly enlarged with normal systolic function. The right atrium is mildly dilated. Mild posterior mitral annular calcification is present. Mild aortic stenosis vs aortic sclerosis. Moderate-to-severe tricuspid regurgitation. Systolic pulmonary artery pressure (SPAP) estimated at 56 mmHg (RA pressure   8 mmHg), consistent with moderate pulmonary hypertension. 8/27/2018 60-65%, LVH, DD3, lae, franklin, mild AS 2.05m/s, max PG 16 mmHg, mean   PG 4 mmHg, mild-mod MR, mod TR 40 mmHg SPAP.     Discharge Medications     Medication List      START taking these medications    magnesium oxide 400 (241.3 Mg) MG Tabs tablet  Commonly known as: MAG-OX  Take 1 tablet by mouth daily  Start taking on: August 18, 2021        CHANGE how you take these medications    ammonium lactate 12 % lotion  Commonly known as: Lac-Hydrin  Apply topically daily. What changed:   · how to take this  · when to take this  · reasons to take this     Eliquis 5 MG Tabs tablet  Generic drug: apixaban  What changed:   · how much to take  · how to take this  · when to take this  · additional instructions     losartan 50 MG tablet  Commonly known as: COZAAR  What changed:   · how much to take  · additional instructions        CONTINUE taking these medications    blood glucose test strips strip  Commonly known as: ONE TOUCH TEST STRIPS  Patient tests once daily. DX: E11.9     butalbital-acetaminophen-caffeine -40 MG per tablet  Commonly known as: FIORICET, ESGIC  Take 1 tablet by mouth daily as needed for Headaches     escitalopram 20 MG tablet  Commonly known as: LEXAPRO  TAKE 1 TABLET DAILY     niacin 500 MG extended release capsule     OCUVEL PO     omeprazole 40 MG delayed release capsule  Commonly known as: PRILOSEC  TAKE [address] w/Device Kit  Patient tests once daily. DX:E11.9     ONE TOUCH LANCETS Misc  Patient tests once daily.   DX:E11.9     Pataday 0.2 % Soln ophthalmic solution  Generic drug: olopatadine     QUEtiapine 50 MG tablet  Commonly known as: SEROQUEL  TAKE 1 TABLET TWICE A DAY     simvastatin 20 MG tablet  Commonly known as: ZOCOR  TAKE 1 TABLET NIGHTLY     spironolactone-hydroCHLOROthiazide 25-25 MG per tablet  Commonly known as: ALDACTAZIDE  TAKE 1 TABLET DAILY     tolterodine 2 MG extended release capsule  Commonly known as: DETROL LA        STOP taking these medications    cephALEXin 250 MG capsule  Commonly known as: KEFLEX     digoxin 125 MCG tablet  Commonly known as: LANOXIN     furosemide 40 MG tablet  Commonly known as: LASIX     metFORMIN 500 MG tablet  Commonly known as: GLUCOPHAGE     metoprolol succinate 50 MG extended release tablet  Commonly known as: TOPROL XL     potassium chloride 20 MEQ extended release tablet  Commonly known as: Klor-Con M20           Where to Get Your Medications      These medications were sent to [facility], [address] [facility] [phone] - F [phone]  [place], [address] [place] [address]    Phone: [phone]   · omeprazole 40 MG delayed release capsule  · simvastatin 20 MG tablet     You can get these medications from any pharmacy    Bring a paper prescription for each of these medications  · magnesium oxide 400 (241.3 Mg) MG Tabs tablet           Discharged in stable condition to home. D/C home with [facility]. Total time 45 minutes. > 50%  dominated by counseling and coordination of care. Follow Up: Follow up with PCP in 1 week.      Ashlyn Suarez MD  8/17/21

## 2021-08-17 NOTE — CARE COORDINATION
DISCHARGE ORDER  Date/Time 2021 4:09 PM  Completed by: Rajesh Dumont RN, Case Management    Patient Name: Naun Antony      : 1933  Admitting Diagnosis: Metabolic encephalopathy [R81.77]  Dehydration [E86.0]  Bradycardia [R00.1]  UTI (urinary tract infection) [N39.0]  ROMI (acute kidney injury) ([facility] [address] [N17.9]  Urinary tract infection without hematuria, site unspecified [N39.0]      Admit order Date and Status: 8/15/21 inpt  (verify MD's last order for status of admission)      Noted discharge order. If applicable PT/OT recommendation at Discharge: SNF  DME recommendation by PT/OT:  Confirmed discharge plan : Yes  with whom pt and moustapha Low via phone  If pt confirmed DC plan does family need to be contacted by CM Yes if yes Moustapha Low  Discharge Plan:  Order for dc noted. Spoke with pt who plans to go home at dc. Spoke with Moustapha Low re: dc today and therapy rec's for SNF. Moustapha Huerta pt wants to come home and has 24h assist and pvt caregiver. Is agreeable to [facility] and chooses [facility]. Referral called to [facility] who will arrange for [facility] needs. Chart reviewed and no other dc needs identified. Reviewed chart. Role of discharge planner explained and patient verbalized understanding. Discharge order is noted. Has Home O2 in place on admit:  No  Informed of need to bring portable home O2 tank on day of discharge for nursing to connect prior to leaving:   Not Indicated  Verbalized agreement/Understanding:   Not Indicated  Pt is being d/c'd to home today. Pt's O2 sats are 94% on RA. Discharge timeout done with  Nsg, CM and pt. All discharge needs and concerns addressed.

## 2021-08-17 NOTE — CARE COORDINATION
[facility]  Received referral regarding HC services from [address] [place] [address]. Sent request to [facility] for [facility] coverage. [facility] is able to see pt for a [facility] with SN Friday 8/20. Liaison to follow up with CM tomorrow 8/18. Telephone call to pt. LM. Sent referral to [facility]. RN to follow up with pt/family regarding palliative vs hospice in future.     Electronically signed by Bret Portillo RN on 8/17/2021 at 3:47 PM

## 2021-08-17 NOTE — PLAN OF CARE
Problem: Falls - Risk of:  Goal: Will remain free from falls  Description: Will remain free from falls  8/16/2021 2302 by Trisha Neumann RN  Outcome: Met This Shift  8/16/2021 1035 by Vikram Hoffman RN  Outcome: Ongoing  Goal: Absence of physical injury  Description: Absence of physical injury  8/16/2021 1035 by Vikram Hoffman RN  Outcome: Ongoing     Problem: Skin Integrity:  Goal: Will show no infection signs and symptoms  Description: Will show no infection signs and symptoms  8/16/2021 2302 by Trisha Neumann RN  Outcome: Met This Shift  8/16/2021 1035 by Vikram Hoffman RN  Outcome: Ongoing  Goal: Absence of new skin breakdown  Description: Absence of new skin breakdown  8/16/2021 1035 by Vikram Hoffman RN  Outcome: Ongoing

## 2021-08-17 NOTE — DISCHARGE INSTR - COC
Continuity of Care Form    Patient Name: Adeel Robertson   :  1933  MRN:  [id number]    Admit date:  2021  Discharge date:  2021    Code Status Order: Full Code   Advance Directives:      Admitting Physician:  Waylon Orourke MD  PCP: Rafaela Chamberlain MD    Discharging Nurse: [facility] Unit/Room#: 6903/8861-57  Discharging Unit Phone Number: [phone]    Emergency Contact:   Extended Emergency Contact Information  Primary Emergency Contact: AMARJIT Govea [address] Phone: [phone]  Mobile Phone: [phone]  Relation: Child  Secondary Emergency Contact: [address] Phone: [phone]  Relation: Child    Past Surgical History:  Past Surgical History:   Procedure Laterality Date    CHOLECYSTECTOMY      CYSTOSCOPY  08/10/2017    DILATION AND CURETTAGE OF UTERUS  2016    HEMORRHOID SURGERY      KNEE ARTHROSCOPY      bilateral    SKIN BIOPSY         Immunization History:   Immunization History   Administered Date(s) Administered    COVID-19, Pfizer, PF, 30mcg/0.3mL 2021, 2021    Influenza Virus Vaccine 10/16/2012, 2014, 2014, 2015, 10/14/2016, 10/01/2019    Influenza Whole 10/16/2012, 2014, 2014    Influenza, High Dose (Fluzone 65 yrs and older) 10/14/2016, 2018    Pneumococcal Conjugate 13-valent (Obngsky47) 2016    Pneumococcal Conjugate 7-valent (Prevnar7) 10/16/2012    Pneumococcal Polysaccharide (Hsmcbowhp26) 10/03/2007, 2012, 2017    Tdap (Boostrix, Adacel) 10/10/2018    Tetanus 1991    Tetanus Toxoid, absorbed 1991       Active Problems:  Patient Active Problem List   Diagnosis Code    Reactive depression F32.9    Paroxysmal atrial fibrillation ([facility] [address] I48.0    Essential hypertension I10    DM2 (diabetes mellitus, type 2) ([facility] [address] E11.9    CAD (coronary artery disease) I25.10    Atrial fibrillation ([facility] [address] I48.91    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.90    Hyperlipidemia E78.5    Morbid obesity with BMI of 50.0-59.9, adult ([facility]) E66.01, Z68.43    Chronic diastolic CHF (congestive heart failure) ([facility]) I50.32    Displaced fracture of distal phalanx of left great toe, initial encounter for closed fracture S92.422A    Heart failure ([facility]) J44.0    Metabolic encephalopathy R61.68    Acute cystitis with hematuria N30.01    Late onset Alzheimer's disease with behavioral disturbance ([facility]) G30.1, F02.81    UTI (urinary tract infection) N39.0    Bradycardia R00.1       Isolation/Infection:   Isolation            No Isolation          Patient Infection Status       None to display            Nurse Assessment:  Last Vital Signs: BP (!) 146/70   Pulse 76   Temp 98.5 °F (36.9 °C) (Oral)   Resp 16   Ht 5' 2\" (1.575 m)   Wt 248 lb 6.4 oz (112.7 kg)   SpO2 94%   BMI 45.43 kg/m²     Last documented pain score (0-10 scale): Pain Level: 0  Last Weight:   Wt Readings from Last 1 Encounters:   08/15/21 248 lb 6.4 oz (112.7 kg)     Mental Status:  Alert, disoriented    IV Access:  - None    Nursing Mobility/ADLs:  Walking   Dependent  Transfer  Dependent  Bathing  Dependent  Dressing  Dependent  Toileting  Dependent  Feeding  Assisted  Med Admin  Assisted  Med Delivery   whole    Wound Care Documentation and Therapy:        Elimination:  Continence:   · Bowel: No  · Bladder: No  Urinary Catheter: None   Colostomy/Ileostomy/Ileal Conduit: No       Date of Last BM: 8/17/2021    Intake/Output Summary (Last 24 hours) at 8/17/2021 1320  Last data filed at 8/17/2021 1023  Gross per 24 hour   Intake 488 ml   Output 1600 ml   Net -1112 ml     I/O last 3 completed shifts: In: 56 [P. O.:60; IV Piggyback:428]  Out: 1400 [Urine:1400]    Safety Concerns:      At Risk for Falls    Impairments/Disabilities:      Speech, Vision, Hearing and Paralysis - flaccid left side    Nutrition Therapy:  Current Nutrition Therapy:   - Oral Diet:  General    Routes of Feeding: Oral  Liquids: Thin Liquids  Daily Fluid Restriction: no  Last Modified Barium Swallow with Video (Video Swallowing Test): not done    Treatments at the Time of Hospital Discharge:   Respiratory Treatments: none  Oxygen Therapy:  is not on home oxygen therapy. Ventilator:    - No ventilator support    Rehab Therapies: Physical Therapy and Occupational Therapy  Weight Bearing Status/Restrictions: No weight bearing restirctions  Other Medical Equipment (for information only, NOT a DME order):  wheelchair  Other Treatments: n/a    Patient's personal belongings (please select all that are sent with patient):  None    RN SIGNATURE:  Electronically signed by Sarahi Velasquez RN on 8/17/21 at 2:37 PM EDT    CASE MANAGEMENT/SOCIAL WORK SECTION    Inpatient Status Date: 8/15/21 inpt    Readmission Risk Assessment Score:  Readmission Risk              Risk of Unplanned Readmission:  19           Discharging to Facility/ Agency   · Discharging to Facility/ Agency   · Name:  [facility]    · Address: [facility]  · Phone: [phone]  · Fax: [phone]    / signature: Electronically signed by Alma Brambila RN on 8/17/21 at 4:04 PM EDT    PHYSICIAN SECTION    Prognosis: Fair    Condition at Discharge: Stable    Rehab Potential (if transferring to Rehab): Fair    Recommended Labs or Other Treatments After Discharge: SN, PT/OT  HCV and Zoom Programs  [facility] 6/78    Physician Certification: I certify the above information and transfer of [place]  is necessary for the continuing treatment of the diagnosis listed and that she requires [address] for less 30 days.      Update Admission H&P: No change in H&P    PHYSICIAN SIGNATURE:  Electronically signed by Beatrice Louie MD on 8/17/21 at 1:20 PM EDT

## 2021-08-18 ENCOUNTER — TELEPHONE (OUTPATIENT)
Dept: INTERNAL MEDICINE CLINIC | Age: 86
End: 2021-08-18

## 2021-08-18 NOTE — TELEPHONE ENCOUNTER
[facility] Transitions Initial Follow Up Call    Outreach made within 2 business days of discharge: Yes    Patient: Leandro Pain Patient : 1933   MRN: [id number]  Reason for Admission: There are no discharge diagnoses documented for the most recent discharge. Discharge Date: 21       Spoke with: Enrico Pardo spoke to son    Discharge department/facility: [facility] Interactive Patient Contact:  Was patient able to fill all prescriptions: Yes  Was patient instructed to bring all medications to the follow-up visit: Yes  Is patient taking all medications as directed in the discharge summary? Yes  Does patient understand their discharge instructions: Yes  Does patient have questions or concerns that need addressed prior to 7-14 day follow up office visit: yes - Message sent to Dr Jose G Gatica     Scheduled appointment with PCP within 7-14 days    Follow Up  No future appointments.     Meeta Copeland

## 2021-08-18 NOTE — TELEPHONE ENCOUNTER
----- Message from Yoselyn Wilks MD sent at 8/18/2021 11:58 AM EDT -----  Contact: Heber Kenny [phone]  Yes I reviewed  Ok to change per hospital dc papers  ----- Message -----  From: Dallin Tello  Sent: 8/18/2021   9:55 AM EDT  To: Yoselyn Wilks MD    Son is asking for you to review all the changes of his mother's medicine upon discharge from hospital on 8/17/21. He was not there upon dc and is just concerned about all the changes and would appreciate a confirmation from you.      Thank you

## 2021-08-27 ENCOUNTER — TELEPHONE (OUTPATIENT)
Dept: INTERNAL MEDICINE CLINIC | Age: 86
End: 2021-08-27

## 2021-08-27 NOTE — TELEPHONE ENCOUNTER
----- Message from Elenita Josue MD sent at 8/27/2021  3:52 PM EDT -----  Contact: Lucita Beckford [phone]  Yes  ----- Message -----  From: Dick Castaneda  Sent: 8/27/2021   3:02 PM EDT  To: MD Lucita Padilla with [facility] called wanting to know if you will sign orders for home care, nursing, and PT eval. Also states pt has a stage 2 pressure ulcer on her coccyx, wanting a verbal okay for mepilex patch. Please advise.

## 2021-08-28 NOTE — PROGRESS NOTES
Physician Progress Note      PATIENT:               Juan Cisneros  CSN #:                  [id number]  :                       1933  ADMIT DATE:       2021 7:07 PM  [address] [place] DATE:        2021 4:40 PM  RESPONDING  PROVIDER #:        Leigh Ann Gil MD          QUERY TEXT:    Pt admitted with UTI. Noted documentation of metabolic encephalopathy per ED. If possible, please document in progress notes and discharge summary:    The medical record reflects the following:  Risk Factors: noted metabolic encephalopathy, AMS  Clinical Indicators: ED-   presenting with increased confusion over the past   several days. Vital signs are within normal limits she is afebrile. On exam,   she is chronically ill-appearing, however not acutely toxic. She appears   fatigued, however answers questions and opens eyes to voice. She is   interacting with her family in the room. H&P-   Positive for altered mental   status and as noted in the HPI. she is A&O x 4 at this time  Treatment: telemetry, Rocephin, IVFs    Thank You Alison Fox RN, CDS [email or web address]  Options provided:  -- Metabolic encephalopathy confirmed present on admission  -- Metabolic encephalopathy  ruled out  -- Other - I will add my own diagnosis  -- Disagree - Not applicable / Not valid  -- Disagree - Clinically unable to determine / Unknown  -- Refer to Clinical Documentation Reviewer    PROVIDER RESPONSE TEXT:    The diagnosis of metabolic encephalopathy was confirmed as present on   admission.     Query created by: Allen Gonzales on 2021 4:10 PM      Electronically signed by:  Leigh Ann Gil MD 2021 9:13 PM

## 2021-09-16 ENCOUNTER — TELEPHONE (OUTPATIENT)
Dept: INTERNAL MEDICINE CLINIC | Age: 86
End: 2021-09-16

## 2021-09-16 DIAGNOSIS — G30.1 LATE ONSET ALZHEIMER'S DISEASE WITH BEHAVIORAL DISTURBANCE (HCC): ICD-10-CM

## 2021-09-16 DIAGNOSIS — F02.818 LATE ONSET ALZHEIMER'S DISEASE WITH BEHAVIORAL DISTURBANCE (HCC): ICD-10-CM

## 2021-09-16 PROBLEM — N39.0 UTI (URINARY TRACT INFECTION): Status: RESOLVED | Noted: 2021-08-15 | Resolved: 2021-09-16

## 2021-09-16 RX ORDER — CIPROFLOXACIN 250 MG/1
250 TABLET, FILM COATED ORAL 2 TIMES DAILY
Qty: 10 TABLET | Refills: 0 | Status: SHIPPED | OUTPATIENT
Start: 2021-09-16 | End: 2021-09-21

## 2021-09-16 RX ORDER — QUETIAPINE FUMARATE 50 MG/1
TABLET, FILM COATED ORAL
Qty: 180 TABLET | Refills: 0 | Status: SHIPPED | OUTPATIENT
Start: 2021-09-16 | End: 2021-12-27

## 2021-09-16 NOTE — TELEPHONE ENCOUNTER
----- Message from Felicia Gould MD sent at 9/15/2021  8:30 PM EDT -----  Contact: Meghan Cardenas [phone]  Cipro 250 mg bid x 5 days  Hold keflex while on this abx  ----- Message -----  From: Shu Saunders  Sent: 9/15/2021  12:39 PM EDT  To: Felicia Gould MD    Daron Christianson daughter called and said she had brought both her parents in for a Dr. Maryann Dodson and that they were both supposed to get a antibiotics the father received his but the mother did not receive any so she was just wanting to know the status of it.          Ester Maker [address] - F [phone]

## 2021-09-16 NOTE — TELEPHONE ENCOUNTER
----- Message from Shaq Portillo MD sent at 9/15/2021  8:30 PM EDT -----  Contact: Meghan Radhika Patel [phone]  Cipro 250 mg bid x 5 days  Hold keflex while on this abx  ----- Message -----  From: Lorie Nair  Sent: 9/15/2021  12:39 PM EDT  To: Shaq Portillo MD    Daron Christianson daughter called and said she had brought both her parents in for a Dr. Ifeoma Castañeda and that they were both supposed to get a antibiotics the father received his but the mother did not receive any so she was just wanting to know the status of it.          [facility] Famely [address] - F [phone]

## 2021-09-16 NOTE — TELEPHONE ENCOUNTER
----- Message from Chante Squires MD sent at 9/16/2021  9:57 AM EDT -----  Contact: Luisito Harris [phone]  Not needed  ----- Message -----  From: Johan Gonzalez  Sent: 9/16/2021   8:17 AM EDT  To: Chante Squires MD    Patient's daughter states home nurse thought patient should be on steroid? Cipro called in. Do you also want pt on steroid?  ----- Message -----  From: Chante Squires MD  Sent: 9/15/2021   8:30 PM EDT  To: Bretta Sicard Rossana    Cipro 250 mg bid x 5 days  Hold keflex while on this abx  ----- Message -----  From: Juan Ortiz  Sent: 9/15/2021  12:39 PM EDT  To: Chante Squires MD    Daron Christianson daughter called and said she had brought both her parents in for a Dr. Lawrence Jenkins and that they were both supposed to get a antibiotics the father received his but the mother did not receive any so she was just wanting to know the status of it.          Leanne Ortiz [address] - F [phone]

## 2021-09-16 NOTE — TELEPHONE ENCOUNTER
Home care nurse called stating that patient was given her AM and PM medications today by her  and possibly some of the husbands antibiotic.   Per Dr Lisa Travis, homecare informed to have patient not take any medications today and to monitor for low BP

## 2021-09-30 RX ORDER — ESCITALOPRAM OXALATE 20 MG/1
TABLET ORAL
Qty: 90 TABLET | Refills: 0 | Status: SHIPPED | OUTPATIENT
Start: 2021-09-30 | End: 2021-12-27

## 2021-09-30 RX ORDER — SPIRONOLACTONE AND HYDROCHLOROTHIAZIDE 25; 25 MG/1; MG/1
TABLET ORAL
Qty: 90 TABLET | Refills: 0 | Status: SHIPPED | OUTPATIENT
Start: 2021-09-30 | End: 2021-11-29 | Stop reason: SDUPTHER

## 2021-10-21 ENCOUNTER — TELEPHONE (OUTPATIENT)
Dept: INTERNAL MEDICINE CLINIC | Age: 86
End: 2021-10-21

## 2021-10-21 NOTE — TELEPHONE ENCOUNTER
----- Message from Wilber Mcarthur MD sent at 10/21/2021 11:49 AM EDT -----  Ok to stop  ----- Message -----  From: Carlos Virgen  Sent: 10/21/2021  10:53 AM EDT  To: Wilber Mcarthur MD    Insurance denied skilled nursing visits as her records do not support the need for a skilled nurse to make more visits.

## 2021-10-27 RX ORDER — TOLTERODINE 2 MG/1
CAPSULE, EXTENDED RELEASE ORAL
Qty: 90 CAPSULE | Refills: 0 | Status: SHIPPED | OUTPATIENT
Start: 2021-10-27 | End: 2022-01-25

## 2021-11-29 RX ORDER — SPIRONOLACTONE AND HYDROCHLOROTHIAZIDE 25; 25 MG/1; MG/1
TABLET ORAL
Qty: 90 TABLET | Refills: 0 | Status: SHIPPED | OUTPATIENT
Start: 2021-11-29 | End: 2022-03-24

## 2021-12-06 ENCOUNTER — TELEPHONE (OUTPATIENT)
Dept: INTERNAL MEDICINE CLINIC | Age: 86
End: 2021-12-06

## 2021-12-24 DIAGNOSIS — G30.1 LATE ONSET ALZHEIMER'S DISEASE WITH BEHAVIORAL DISTURBANCE (HCC): ICD-10-CM

## 2021-12-24 DIAGNOSIS — F02.818 LATE ONSET ALZHEIMER'S DISEASE WITH BEHAVIORAL DISTURBANCE (HCC): ICD-10-CM

## 2021-12-27 RX ORDER — QUETIAPINE FUMARATE 50 MG/1
TABLET, FILM COATED ORAL
Qty: 180 TABLET | Refills: 0 | Status: SHIPPED | OUTPATIENT
Start: 2021-12-27 | End: 2022-03-24

## 2021-12-27 RX ORDER — ESCITALOPRAM OXALATE 20 MG/1
TABLET ORAL
Qty: 90 TABLET | Refills: 0 | Status: SHIPPED | OUTPATIENT
Start: 2021-12-27 | End: 2022-03-24

## 2022-01-20 ENCOUNTER — TELEPHONE (OUTPATIENT)
Dept: INTERNAL MEDICINE CLINIC | Age: 87
End: 2022-01-20

## 2022-01-20 NOTE — TELEPHONE ENCOUNTER
----- Message from Ely Walker MD sent at 1/20/2022  4:39 PM EST -----  Contact: Meghan/daughter [phone]  Ok  ----- Message -----  From: Travon Ghotra  Sent: 1/20/2022   4:26 PM EST  To: Ely Walker MD    FYI: Daughter wanted to inform you they have called [facility].     Thank you

## 2022-01-25 RX ORDER — TOLTERODINE 2 MG/1
CAPSULE, EXTENDED RELEASE ORAL
Qty: 90 CAPSULE | Refills: 0 | Status: SHIPPED | OUTPATIENT
Start: 2022-01-25 | End: 2022-04-18

## 2022-03-24 DIAGNOSIS — G30.1 LATE ONSET ALZHEIMER'S DISEASE WITH BEHAVIORAL DISTURBANCE (HCC): ICD-10-CM

## 2022-03-24 DIAGNOSIS — F02.818 LATE ONSET ALZHEIMER'S DISEASE WITH BEHAVIORAL DISTURBANCE (HCC): ICD-10-CM

## 2022-03-24 RX ORDER — SPIRONOLACTONE AND HYDROCHLOROTHIAZIDE 25; 25 MG/1; MG/1
TABLET ORAL
Qty: 90 TABLET | Refills: 0 | Status: SHIPPED | OUTPATIENT
Start: 2022-03-24

## 2022-03-24 RX ORDER — ESCITALOPRAM OXALATE 20 MG/1
TABLET ORAL
Qty: 90 TABLET | Refills: 0 | Status: SHIPPED | OUTPATIENT
Start: 2022-03-24

## 2022-03-24 RX ORDER — QUETIAPINE FUMARATE 50 MG/1
TABLET, FILM COATED ORAL
Qty: 180 TABLET | Refills: 0 | Status: SHIPPED | OUTPATIENT
Start: 2022-03-24

## 2022-04-18 RX ORDER — TOLTERODINE 2 MG/1
CAPSULE, EXTENDED RELEASE ORAL
Qty: 90 CAPSULE | Refills: 0 | Status: SHIPPED | OUTPATIENT
Start: 2022-04-18

## 2022-06-15 DIAGNOSIS — F02.818 LATE ONSET ALZHEIMER'S DISEASE WITH BEHAVIORAL DISTURBANCE (HCC): ICD-10-CM

## 2022-06-15 DIAGNOSIS — G30.1 LATE ONSET ALZHEIMER'S DISEASE WITH BEHAVIORAL DISTURBANCE (HCC): ICD-10-CM

## 2022-06-17 RX ORDER — SPIRONOLACTONE AND HYDROCHLOROTHIAZIDE 25; 25 MG/1; MG/1
TABLET ORAL
Qty: 90 TABLET | Refills: 0 | OUTPATIENT
Start: 2022-06-17

## 2022-06-17 RX ORDER — QUETIAPINE FUMARATE 50 MG/1
TABLET, FILM COATED ORAL
Qty: 180 TABLET | Refills: 0 | OUTPATIENT
Start: 2022-06-17

## 2022-06-17 RX ORDER — ESCITALOPRAM OXALATE 20 MG/1
TABLET ORAL
Qty: 90 TABLET | Refills: 0 | OUTPATIENT
Start: 2022-06-17

## 2022-07-11 RX ORDER — TOLTERODINE 2 MG/1
CAPSULE, EXTENDED RELEASE ORAL
Qty: 90 CAPSULE | Refills: 0 | OUTPATIENT
Start: 2022-07-11

## 2023-10-05 NOTE — TELEPHONE ENCOUNTER
Left Vm to schedule future appointment Physical Therapy  Cancellation/No-show Note  Patient Name:  Darcy Earl  :  1950   Date:  10/5/2023  Cancelled visits to date: 0  No-shows to date: 0    For today's appointment patient:  []  Cancelled  []  Rescheduled appointment  [x]  No-show     Reason given by patient:  []  Patient ill  []  Conflicting appointment  []  No transportation    []  Conflict with work  [x]  No reason given  []  Other:     Comments:  Called. No answer.     Electronically signed by:  Silvia Fall PTA, CLT 10/5/2023, 8:18 AM

## 2024-01-22 ENCOUNTER — HOSPITAL ENCOUNTER (OUTPATIENT)
Age: 89
Setting detail: SPECIMEN
Discharge: HOME | End: 2024-01-22
Payer: MEDICARE

## 2024-01-22 LAB
BILIRUB UR QL STRIP.AUTO: ABNORMAL
CLARITY UR: CLEAR
COLOR UR: ABNORMAL
GLUCOSE UR STRIP.AUTO-MCNC: NEGATIVE MG/DL
HGB UR QL STRIP.AUTO: NEGATIVE
KETONES UR STRIP.AUTO-MCNC: NEGATIVE MG/DL
LEUKOCYTE ESTERASE UR QL STRIP.AUTO: NEGATIVE
NITRITE UR QL STRIP.AUTO: NEGATIVE
PH UR STRIP.AUTO: 6 [PH] (ref 5–8)
PROT UR STRIP.AUTO-MCNC: NEGATIVE MG/DL
SP GR UR STRIP.AUTO: 1.02 (ref 1–1.03)
UA DIPSTICK W REFLEX MICRO PNL UR: ABNORMAL
URN SPEC COLLECT METH UR: ABNORMAL
UROBILINOGEN UR STRIP-ACNC: 2 E.U./DL

## 2024-01-22 PROCEDURE — 87086 URINE CULTURE/COLONY COUNT: CPT

## 2024-01-22 PROCEDURE — 81003 URINALYSIS AUTO W/O SCOPE: CPT

## 2024-01-24 LAB — BACTERIA UR CULT: NORMAL
